# Patient Record
Sex: FEMALE | Race: WHITE | Employment: PART TIME | URBAN - NONMETROPOLITAN AREA
[De-identification: names, ages, dates, MRNs, and addresses within clinical notes are randomized per-mention and may not be internally consistent; named-entity substitution may affect disease eponyms.]

---

## 2017-04-07 ENCOUNTER — OFFICE VISIT (OUTPATIENT)
Dept: FAMILY MEDICINE | Facility: OTHER | Age: 32
End: 2017-04-07
Attending: FAMILY MEDICINE
Payer: COMMERCIAL

## 2017-04-07 VITALS
BODY MASS INDEX: 31 KG/M2 | TEMPERATURE: 97.6 F | RESPIRATION RATE: 20 BRPM | DIASTOLIC BLOOD PRESSURE: 62 MMHG | HEART RATE: 70 BPM | HEIGHT: 64 IN | WEIGHT: 181.6 LBS | SYSTOLIC BLOOD PRESSURE: 118 MMHG | OXYGEN SATURATION: 97 %

## 2017-04-07 DIAGNOSIS — Z82.49 FAMILY HISTORY OF HEART DISEASE IN MALE FAMILY MEMBER BEFORE AGE 55: ICD-10-CM

## 2017-04-07 DIAGNOSIS — Z71.89 ACP (ADVANCE CARE PLANNING): Chronic | ICD-10-CM

## 2017-04-07 DIAGNOSIS — Z76.89 ESTABLISHING CARE WITH NEW DOCTOR, ENCOUNTER FOR: Primary | ICD-10-CM

## 2017-04-07 DIAGNOSIS — K21.9 GASTROESOPHAGEAL REFLUX DISEASE, ESOPHAGITIS PRESENCE NOT SPECIFIED: ICD-10-CM

## 2017-04-07 DIAGNOSIS — Z82.49 FAMILY HISTORY OF HEART DISEASE IN FEMALE FAMILY MEMBER BEFORE AGE 65: ICD-10-CM

## 2017-04-07 DIAGNOSIS — G43.009 MIGRAINE WITHOUT AURA AND WITHOUT STATUS MIGRAINOSUS, NOT INTRACTABLE: ICD-10-CM

## 2017-04-07 DIAGNOSIS — Z30.42 ENCOUNTER FOR MANAGEMENT AND INJECTION OF INJECTABLE PROGESTIN CONTRACEPTIVE: ICD-10-CM

## 2017-04-07 DIAGNOSIS — K58.2 IRRITABLE BOWEL SYNDROME WITH BOTH CONSTIPATION AND DIARRHEA: ICD-10-CM

## 2017-04-07 DIAGNOSIS — F17.200 TOBACCO USE DISORDER: ICD-10-CM

## 2017-04-07 PROCEDURE — 99203 OFFICE O/P NEW LOW 30 MIN: CPT | Performed by: FAMILY MEDICINE

## 2017-04-07 RX ORDER — MEDROXYPROGESTERONE ACETATE 150 MG/ML
150 INJECTION, SUSPENSION INTRAMUSCULAR
COMMUNITY
Start: 2014-10-02 | End: 2017-04-07

## 2017-04-07 RX ORDER — PANTOPRAZOLE SODIUM 40 MG/1
40 TABLET, DELAYED RELEASE ORAL
COMMUNITY
Start: 2016-10-23 | End: 2017-05-04

## 2017-04-07 RX ORDER — SUMATRIPTAN 100 MG/1
100 TABLET, FILM COATED ORAL
Qty: 30 TABLET | Refills: 1 | Status: SHIPPED | OUTPATIENT
Start: 2017-04-07 | End: 2017-09-06

## 2017-04-07 RX ORDER — PROPRANOLOL HYDROCHLORIDE 80 MG/1
CAPSULE, EXTENDED RELEASE ORAL
COMMUNITY
Start: 2017-03-28 | End: 2017-06-26

## 2017-04-07 RX ORDER — MEDROXYPROGESTERONE ACETATE 150 MG/ML
150 INJECTION, SUSPENSION INTRAMUSCULAR
Qty: 0.9 ML | Refills: 3 | OUTPATIENT
Start: 2017-04-07 | End: 2018-05-22

## 2017-04-07 RX ORDER — DOCUSATE SODIUM 100 MG/1
200 CAPSULE, LIQUID FILLED ORAL
COMMUNITY
Start: 2011-06-23 | End: 2018-04-04

## 2017-04-07 RX ORDER — DICYCLOMINE HCL 20 MG
20 TABLET ORAL
COMMUNITY
Start: 2016-01-26 | End: 2017-08-31

## 2017-04-07 RX ORDER — SUMATRIPTAN 100 MG/1
TABLET, FILM COATED ORAL
COMMUNITY
Start: 2017-02-27 | End: 2017-04-07

## 2017-04-07 RX ORDER — PROCHLORPERAZINE MALEATE 5 MG
TABLET ORAL
COMMUNITY
Start: 2016-03-29 | End: 2017-08-09

## 2017-04-07 ASSESSMENT — ANXIETY QUESTIONNAIRES
7. FEELING AFRAID AS IF SOMETHING AWFUL MIGHT HAPPEN: NOT AT ALL
GAD7 TOTAL SCORE: 0
1. FEELING NERVOUS, ANXIOUS, OR ON EDGE: NOT AT ALL
6. BECOMING EASILY ANNOYED OR IRRITABLE: NOT AT ALL
5. BEING SO RESTLESS THAT IT IS HARD TO SIT STILL: NOT AT ALL
3. WORRYING TOO MUCH ABOUT DIFFERENT THINGS: NOT AT ALL
2. NOT BEING ABLE TO STOP OR CONTROL WORRYING: NOT AT ALL

## 2017-04-07 ASSESSMENT — PATIENT HEALTH QUESTIONNAIRE - PHQ9: 5. POOR APPETITE OR OVEREATING: NOT AT ALL

## 2017-04-07 ASSESSMENT — PAIN SCALES - GENERAL: PAINLEVEL: NO PAIN (0)

## 2017-04-07 NOTE — PATIENT INSTRUCTIONS
Depo Provera given.  Awaiting records from [facility].  Will see if due for physical, lab, etc.  Smoking cessation advised.  Healthy diet, exercise, weight management advised.  Will contact cardiology for recommendation of consult/stress test, etc.  Imitrex refilled.

## 2017-04-07 NOTE — PROGRESS NOTES
"SUBJECTIVE:  Aubree is a 31 year old female who comes in today for establish care.  Mom, Kassy, was my patient, but I referred her on to Dr. Carmen given her complex medical issues.  Patient is a smoker, 1/2 ppd, not ready to quit.    She is G0, on Depo provera for 5 years, amenorrheic.  Due for Depo 4/5-4/19.  Last pap 1-2 years ago, no prior abnormal pap or std.   She has chronic migraines, followed by Chrissie Cade, neurology.  Prior Topamax, Elavil, Depakote.  Now on Inderal LA.  Offered botox, but not ready.  Migraines 2 times per week.  Has IBS - \"spastic colon\", and GERD.  Controlled with medications.  She has early family history of CAD.  Mom has had 8 stents and first done in her 40s.  Brother had occlusive disease at 37 requiring stents.  He was taken directly to angiogram rather than stress test given family history.  He passed away from bladder cancer.      Current Outpatient Prescriptions   Medication     loratadine-pseudoePHEDrine (CLARITIN-D 24-HOUR)  MG per 24 hr tablet     dicyclomine (BENTYL) 20 MG tablet     docusate sodium (COLACE) 100 MG capsule     pantoprazole (PROTONIX) 40 MG EC tablet     prochlorperazine (COMPAZINE) 5 MG tablet     propranolol (INDERAL LA) 80 MG 24 hr capsule     medroxyPROGESTERone (DEPO-PROVERA) 150 MG/ML injection     SUMAtriptan (IMITREX) 100 MG tablet     [DISCONTINUED] medroxyPROGESTERone (DEPO-PROVERA) 150 MG/ML injection     No current facility-administered medications for this visit.         Allergies   Allergen Reactions     Linaclotide      diarrhea       History reviewed. No pertinent past medical history.  Past Surgical History:   Procedure Laterality Date     ORTHOPEDIC SURGERY  2002    ankle surgery- bone spur removal     Family History   Problem Relation Age of Onset     HEART DISEASE Mother      8 stents; onset 40s?     Chronic Obstructive Pulmonary Disease Mother      CANCER Father      mouth and throat cancer     HEART DISEASE Brother      " "stents; age 37; occlusive disease     Bladder Cancer Brother      Social History     Social History     Marital status: Single     Spouse name: N/A     Number of children: N/A     Years of education: N/A     Occupational History     Not on file.     Social History Main Topics     Smoking status: Current Every Day Smoker     Packs/day: 0.50     Years: 15.00     Types: Cigarettes     Smokeless tobacco: Not on file     Alcohol use No     Drug use: No     Sexual activity: No     Other Topics Concern     Parent/Sibling W/ Cabg, Mi Or Angioplasty Before 65f 55m? Yes     Social History Narrative     No narrative on file       ROS:  General: positive for as above, headaches  Resp: No shortness of breath, No dyspnea on exertion and No cough  CV: negative for, palpitations, chest pain, exertional chest pain or pressure, lower extremity edema and syncope or near-syncope  Musculoskeletal: negative for, joint pain and joint swelling  Neurologic: positive for migraine headaches  Psychiatric: negative for, anxiety, depression, excessive alcohol consumption and illegal drug usage  Endocrine: negative  PHQ-9 SCORE 4/7/2017   Total Score 0     JESSICA-7 SCORE 4/7/2017   Total Score 0       OBJECTIVE:  Vitals:    04/07/17 0805   BP: 118/62   BP Location: Right arm   Patient Position: Chair   Cuff Size: Adult Large   Pulse: 70   Resp: 20   Temp: 97.6  F (36.4  C)   TempSrc: Tympanic   SpO2: 97%   Weight: 181 lb 9.6 oz (82.4 kg)   Height: 5' 3.5\" (1.613 m)     GENERAL APPEARANCE: alert, no distress and cooperative  NECK: no adenopathy, no asymmetry, masses, or scars and thyroid normal to palpation  RESP: lungs clear to auscultation - no rales, rhonchi or wheezes  CV: regular rates and rhythm, normal S1 S2, no S3 or S4 and no murmur, click or rub -  MS: extremities normal- no gross deformities noted, no evidence of inflammation in joints, FROM in all extremities.  PSYCH: mentation appears normal. and affect " normal/bright    ASSESSMENT/ORDERS:    ICD-10-CM    1. Establishing care with new doctor, encounter for Z71.89    2. ACP (advance care planning) Z71.89    3. Tobacco use disorder F17.200    4. Migraine without aura and without status migrainosus, not intractable G43.009 SUMAtriptan (IMITREX) 100 MG tablet   5. Gastroesophageal reflux disease, esophagitis presence not specified K21.9    6. Irritable bowel syndrome with both constipation and diarrhea K58.2    7. Family history of heart disease in female family member before age 65 Z82.49    8. Family history of heart disease in male family member before age 55 Z82.49    9. Encounter for management and injection of injectable progestin contraceptive Z30.42 medroxyPROGESTERone (DEPO-PROVERA) 150 MG/ML injection     C Medroxyprogesterone inj/1mg     INJECTION INTRAMUSCULAR OR SUB-Q     PLAN:  Patient Instructions   Depo Provera given.  Awaiting records from [facility].  Will see if due for physical, lab, etc.  Smoking cessation advised.  Healthy diet, exercise, weight management advised.  Will contact cardiology for recommendation of consult/stress test, etc.  Imitrex refilled.    Note sent to Dr. Fu.    Kendra Bailey

## 2017-04-07 NOTE — MR AVS SNAPSHOT
After Visit Summary   4/7/2017    Aubree Corcoran    MRN: [id number]           Patient Information     Date Of Birth          1985        Visit Information        Provider Department      4/7/2017 8:15 AM Kendra Yanez MD [facility]        Today's Diagnoses     Establishing care with new doctor, encounter for    -  1    ACP (advance care planning)        Tobacco use disorder        Migraine without aura and without status migrainosus, not intractable        Gastroesophageal reflux disease, esophagitis presence not specified        Irritable bowel syndrome with both constipation and diarrhea        Family history of heart disease in female family member before age 65        Family history of heart disease in male family member before age 55        Encounter for management and injection of injectable progestin contraceptive          Care Instructions    Depo Provera given.  Awaiting records from [facility].  Will see if due for physical, lab, etc.  Smoking cessation advised.  Healthy diet, exercise, weight management advised.  Will contact cardiology for recommendation of consult/stress test, etc.  Imitrex refilled.        Follow-ups after your visit        Future tests that were ordered for you today     Open Standing Orders        Priority Remaining Interval Expires Ordered    C Medroxyprogesterone inj/1mg Routine 4/4 4/7/2018 4/7/2017    INJECTION INTRAMUSCULAR OR SUB-Q Routine 4/4 4/7/2018 4/7/2017            Who to contact     If you have questions or need follow up information about today's clinic visit or your schedule please contact [facility] directly at [phone].  Normal or non-critical lab and imaging results will be communicated to you by MyChart, letter or phone within 4 business days after the clinic has received the results. If you do not hear from us within 7 days, please contact the clinic through MyChart or phone. If you have a critical or  "abnormal lab result, we will notify you by phone as soon as possible.  Submit refill requests through [employer] or call your pharmacy and they will forward the refill request to us. Please allow 3 business days for your refill to be completed.          Additional Information About Your Visit        [employer] Information     [employer] lets you send messages to your doctor, view your test results, renew your prescriptions, schedule appointments and more. To sign up, go to [email or web address] . Click on \"Log in\" on the left side of the screen, which will take you to the Welcome page. Then click on \"Sign up Now\" on the right side of the page.     You will be asked to enter the access code listed below, as well as some personal information. Please follow the directions to create your username and password.     Your access code is: [email or web address]  Expires: 2017  8:30 AM     Your access code will  in 90 days. If you need help or a new code, please call your [facility] clinic or [phone].        Care EveryWhere ID     This is your Care EveryWhere ID. This could be used by other organizations to access your [facility] medical records  [id number]        Your Vitals Were     Pulse Temperature Respirations Height Pulse Oximetry BMI (Body Mass Index)    70 97.6  F (36.4  C) (Tympanic) 20 5' 3.5\" (1.613 m) 97% 31.66 kg/m2       Blood Pressure from Last 3 Encounters:   17 118/62    Weight from Last 3 Encounters:   17 181 lb 9.6 oz (82.4 kg)                 Today's Medication Changes          These changes are accurate as of: 17  8:30 AM.  If you have any questions, ask your nurse or doctor.               These medicines have changed or have updated prescriptions.        Dose/Directions    medroxyPROGESTERone 150 MG/ML injection   Commonly known as:  DEPO-PROVERA   This may have changed:  when to take this   Used for:  Encounter for management and injection of injectable progestin contraceptive   Changed by: "  Kendra Yanez MD        Dose:  150 mg   Inject 1 mL (150 mg) into the muscle every 3 months   Quantity:  0.9 mL   Refills:  3       SUMAtriptan 100 MG tablet   Commonly known as:  IMITREX   This may have changed:  See the new instructions.   Used for:  Migraine without aura and without status migrainosus, not intractable   Changed by:  Kendra Yanez MD        Dose:  100 mg   Take 1 tablet (100 mg) by mouth at onset of headache for migraine   Quantity:  30 tablet   Refills:  1            Where to get your medicines      These medications were sent to [facility] - [facility] [address]  [address] [facility] [address]     Phone:  [phone]     SUMAtriptan 100 MG tablet         Some of these will need a paper prescription and others can be bought over the counter.  Ask your nurse if you have questions.     You don't need a prescription for these medications     medroxyPROGESTERone 150 MG/ML injection                Primary Care Provider Office Phone # Fax #    Neema Lovett [phone]       [facility] [address]  [facility] [address]        Thank you!     Thank you for choosing [facility]  for your care. Our goal is always to provide you with excellent care. Hearing back from our patients is one way we can continue to improve our services. Please take a few minutes to complete the written survey that you may receive in the mail after your visit with us. Thank you!             Your Updated Medication List - Protect others around you: Learn how to safely use, store and throw away your medicines at www.disposemymeds.org.          This list is accurate as of: 4/7/17  8:30 AM.  Always use your most recent med list.                   Brand Name Dispense Instructions for use    dicyclomine 20 MG tablet    BENTYL     Take 20 mg by mouth       docusate sodium 100 MG capsule    COLACE     Take 200 mg by mouth        loratadine-pseudoePHEDrine  MG per 24 hr tablet    CLARITIN-D 24-hour     TAKE 1 TABLET DAILY BY MOUTH - GENERIC FOR CLARITIN-D       medroxyPROGESTERone 150 MG/ML injection    DEPO-PROVERA    0.9 mL    Inject 1 mL (150 mg) into the muscle every 3 months       pantoprazole 40 MG EC tablet    PROTONIX     Take 40 mg by mouth       prochlorperazine 5 MG tablet    COMPAZINE     TAKE ONE TO TWO TABLETS BY MOUTH EVERY 8 HOURS AS NEEDED FOR NAUSEA       propranolol 80 MG 24 hr capsule    INDERAL LA     TAKE ONE CAPSULE BY MOUTH ONCE DAILY       SUMAtriptan 100 MG tablet    IMITREX    30 tablet    Take 1 tablet (100 mg) by mouth at onset of headache for migraine

## 2017-04-08 ASSESSMENT — PATIENT HEALTH QUESTIONNAIRE - PHQ9: SUM OF ALL RESPONSES TO PHQ QUESTIONS 1-9: 0

## 2017-04-08 ASSESSMENT — ANXIETY QUESTIONNAIRES: GAD7 TOTAL SCORE: 0

## 2017-04-10 ENCOUNTER — TELEPHONE (OUTPATIENT)
Dept: FAMILY MEDICINE | Facility: OTHER | Age: 32
End: 2017-04-10

## 2017-04-10 DIAGNOSIS — Z82.49 FAMILY HISTORY OF HEART DISEASE IN MALE FAMILY MEMBER BEFORE AGE 55: ICD-10-CM

## 2017-04-10 DIAGNOSIS — Z82.49 FAMILY HISTORY OF HEART DISEASE IN FEMALE FAMILY MEMBER BEFORE AGE 65: ICD-10-CM

## 2017-04-10 DIAGNOSIS — Z82.49 FAMILY HISTORY OF EARLY CAD: Primary | ICD-10-CM

## 2017-04-10 NOTE — TELEPHONE ENCOUNTER
Receive response from cardiology.  They do recommend consult with Dr. Malave, given early family history.  Consult ordered.  Please notify patient to expect a call.  Thank you Td

## 2017-04-28 DIAGNOSIS — K21.9 GASTROESOPHAGEAL REFLUX DISEASE, ESOPHAGITIS PRESENCE NOT SPECIFIED: Primary | ICD-10-CM

## 2017-04-28 RX ORDER — PANTOPRAZOLE SODIUM 40 MG/1
40 TABLET, DELAYED RELEASE ORAL DAILY
Qty: 90 TABLET | Refills: 0 | OUTPATIENT
Start: 2017-04-28

## 2017-05-04 RX ORDER — PANTOPRAZOLE SODIUM 40 MG/1
40 TABLET, DELAYED RELEASE ORAL DAILY
Qty: 30 TABLET | Refills: 3 | Status: SHIPPED | OUTPATIENT
Start: 2017-05-04 | End: 2017-07-24

## 2017-05-04 NOTE — TELEPHONE ENCOUNTER
Pt would like to know if Dr Yanez can fill this medication since she established care with her on 4-7-17. Patient states she is currently out of this medication.

## 2017-06-26 DIAGNOSIS — J30.2 CHRONIC SEASONAL ALLERGIC RHINITIS, UNSPECIFIED TRIGGER: ICD-10-CM

## 2017-06-26 DIAGNOSIS — G43.009 MIGRAINE WITHOUT AURA AND WITHOUT STATUS MIGRAINOSUS, NOT INTRACTABLE: Primary | ICD-10-CM

## 2017-06-28 ENCOUNTER — ALLIED HEALTH/NURSE VISIT (OUTPATIENT)
Dept: ALLERGY | Facility: OTHER | Age: 32
End: 2017-06-28
Attending: FAMILY MEDICINE
Payer: COMMERCIAL

## 2017-06-28 DIAGNOSIS — Z30.42 ENCOUNTER FOR SURVEILLANCE OF INJECTABLE CONTRACEPTIVE: Primary | ICD-10-CM

## 2017-06-28 PROCEDURE — 96372 THER/PROPH/DIAG INJ SC/IM: CPT

## 2017-06-28 RX ORDER — PROPRANOLOL HYDROCHLORIDE 80 MG/1
CAPSULE, EXTENDED RELEASE ORAL
Qty: 90 CAPSULE | Refills: 0 | Status: SHIPPED | OUTPATIENT
Start: 2017-06-28 | End: 2017-12-15

## 2017-06-28 RX ORDER — LORATADINE AND PSEUDOEPHEDRINE SULFATE 10; 240 MG/1; MG/1
TABLET, FILM COATED, EXTENDED RELEASE ORAL
Qty: 90 TABLET | Refills: 0 | Status: SHIPPED | OUTPATIENT
Start: 2017-06-28 | End: 2017-10-30

## 2017-06-28 NOTE — NURSING NOTE
Prior to injection verified patient identity using patient's name and date of birth. Per orders of Dr. Tripathi, injection of Depo provera  given by Jessica Martinez. Patient instructed to remain in clinic for 20 minutes afterwards, and to report any adverse reaction to me immediately.      Jessica Martinez LPN

## 2017-06-28 NOTE — TELEPHONE ENCOUNTER
propranolol (INDERAL LA) 80 MG 24 hr capsule  Last Written Prescription Date: HISTORICAL  Last Fill Quantity: HISTORICAL, # refills: 0  Last Office Visit with [facility], [facility] or [facility] prescribing provider: 4-7-2017       BP Readings from Last 3 Encounters:   04/07/17 118/62     ALLERGY NASAL DECONGESTION     Last Written Prescription Date: HISTORICAL  Last Fill Quantity: HISTORICAL,  # refills: 0   Last Office Visit with [facility], [facility] or [facility] prescribing provider: 4-7-2017

## 2017-06-28 NOTE — MR AVS SNAPSHOT
"              After Visit Summary   2017    Aubree Corcoran    MRN: [id number]           Patient Information     Date Of Birth          1985        Visit Information        Provider Department      2017 2:30 PM HC SHOT ROOM [facility]        Today's Diagnoses     Encounter for surveillance of injectable contraceptive    -  1       Follow-ups after your visit        Who to contact     If you have questions or need follow up information about today's clinic visit or your schedule please contact [facility] directly at [phone].  Normal or non-critical lab and imaging results will be communicated to you by MyChart, letter or phone within 4 business days after the clinic has received the results. If you do not hear from us within 7 days, please contact the clinic through [employer] or phone. If you have a critical or abnormal lab result, we will notify you by phone as soon as possible.  Submit refill requests through [employer] or call your pharmacy and they will forward the refill request to us. Please allow 3 business days for your refill to be completed.          Additional Information About Your Visit        MyChart Information     [employer] lets you send messages to your doctor, view your test results, renew your prescriptions, schedule appointments and more. To sign up, go to [email or web address] . Click on \"Log in\" on the left side of the screen, which will take you to the Welcome page. Then click on \"Sign up Now\" on the right side of the page.     You will be asked to enter the access code listed below, as well as some personal information. Please follow the directions to create your username and password.     Your access code is: [email or web address]  Expires: 2017  8:30 AM     Your access code will  in 90 days. If you need help or a new code, please call your [facility] or [phone].        Care EveryWhere ID     This is your Care EveryWhere ID. This could " be used by other organizations to access your [place] medical records  [id number]         Blood Pressure from Last 3 Encounters:   04/07/17 118/62    Weight from Last 3 Encounters:   04/07/17 181 lb 9.6 oz (82.4 kg)              We Performed the Following     C Medroxyprogesterone inj/1mg     INJECTION INTRAMUSCULAR OR SUB-Q        Primary Care Provider Office Phone # Fax #    Kendra Yanez [phone]       [facility] [address]  [place] [address]        Equal Access to Services     [facility]: Hadii aad ku hadasho Soomaali, waaxda luqadaha, qaybta kaalmada adeegyada, waxay scarletin haysethn adebrigida johnson . So [facility] [phone].    ATENCIÓN: Si habla español, tiene a mishra disposición servicios gratuitos de asistencia lingüística. [facility] [phone].    We comply with applicable federal civil rights laws and [place] laws. We do not discriminate on the basis of race, color, national origin, age, disability sex, sexual orientation or gender identity.            Thank you!     Thank you for choosing [facility]  for your care. Our goal is always to provide you with excellent care. Hearing back from our patients is one way we can continue to improve our services. Please take a few minutes to complete the written survey that you may receive in the mail after your visit with us. Thank you!             Your Updated Medication List - Protect others around you: Learn how to safely use, store and throw away your medicines at www.disposemymeds.org.          This list is accurate as of: 6/28/17  2:46 PM.  Always use your most recent med list.                   Brand Name Dispense Instructions for use Diagnosis    dicyclomine 20 MG tablet    BENTYL     Take 20 mg by mouth        docusate sodium 100 MG capsule    COLACE     Take 200 mg by mouth        loratadine-pseudoePHEDrine  MG per 24 hr tablet    CLARITIN-D 24-hour     TAKE 1 TABLET DAILY BY MOUTH -  GENERIC FOR CLARITIN-D        medroxyPROGESTERone 150 MG/ML injection    DEPO-PROVERA    0.9 mL    Inject 1 mL (150 mg) into the muscle every 3 months    Encounter for management and injection of injectable progestin contraceptive       pantoprazole 40 MG EC tablet    PROTONIX    30 tablet    Take 1 tablet (40 mg) by mouth daily    Gastroesophageal reflux disease, esophagitis presence not specified       prochlorperazine 5 MG tablet    COMPAZINE     TAKE ONE TO TWO TABLETS BY MOUTH EVERY 8 HOURS AS NEEDED FOR NAUSEA        propranolol 80 MG 24 hr capsule    INDERAL LA     TAKE ONE CAPSULE BY MOUTH ONCE DAILY        SUMAtriptan 100 MG tablet    IMITREX    30 tablet    Take 1 tablet (100 mg) by mouth at onset of headache for migraine    Migraine without aura and without status migrainosus, not intractable

## 2017-06-28 NOTE — PROGRESS NOTES
The following medication was given:     MEDICATION: Depo Provera 150mg  ROUTE: IM  SITE: [facility]  DOSE: 1ml  LOT #: [id number]  :  Shan CAREY   EXPIRATION DATE:  12/2019  NDC#: [address]-6  Patient instructed to return September 13th-27th, 2017    Jessica Martinez LPN

## 2017-07-24 DIAGNOSIS — K21.9 GASTROESOPHAGEAL REFLUX DISEASE, ESOPHAGITIS PRESENCE NOT SPECIFIED: ICD-10-CM

## 2017-07-26 RX ORDER — PANTOPRAZOLE SODIUM 40 MG/1
TABLET, DELAYED RELEASE ORAL
Qty: 30 TABLET | Refills: 8 | Status: SHIPPED | OUTPATIENT
Start: 2017-07-26 | End: 2018-05-15

## 2017-08-09 ENCOUNTER — TELEPHONE (OUTPATIENT)
Dept: FAMILY MEDICINE | Facility: OTHER | Age: 32
End: 2017-08-09

## 2017-08-09 DIAGNOSIS — G43.009 MIGRAINE WITHOUT AURA AND WITHOUT STATUS MIGRAINOSUS, NOT INTRACTABLE: Primary | ICD-10-CM

## 2017-08-09 RX ORDER — PROCHLORPERAZINE MALEATE 5 MG
TABLET ORAL
Qty: 90 TABLET | Refills: 0 | Status: SHIPPED | OUTPATIENT
Start: 2017-08-09 | End: 2017-11-06

## 2017-08-09 NOTE — TELEPHONE ENCOUNTER
Reason for call:  Medication    1. Medication Name? Prochlorper 5mg One to two tablets by mouth as needed every 8 hours as needed for nausea  2. Is this request for a refill? Yes  3. What Pharmacy do you use? '[facility]  4. Have you contacted your pharmacy? No    5. If yes, when?  (Please note that the turn-around-time for prescriptions is 72 business hours; I am sending your request at this time. SEND TO  [facility]  )  Description: Patient would like to know if Dr Yanez can refill this prescription. This is the first time she has needed to request a refill on this medication since establishing with Dr Yanez and pharmacy told her she should have it initiated through provider.  Was an appointment offered for this a call? No   Preferred method for responding to this messageTelephone Call - [phone]  If we cannot reach you directly, may we leave a detailed response at the number you provided? Yes  Can this message wait until your PCP/Provider returns if not available today? No

## 2017-08-31 DIAGNOSIS — K58.2 IRRITABLE BOWEL SYNDROME WITH BOTH CONSTIPATION AND DIARRHEA: Primary | ICD-10-CM

## 2017-08-31 RX ORDER — DICYCLOMINE HCL 20 MG
20 TABLET ORAL 3 TIMES DAILY
Qty: 120 TABLET | Refills: 0 | Status: SHIPPED | OUTPATIENT
Start: 2017-08-31 | End: 2017-11-20

## 2017-08-31 NOTE — TELEPHONE ENCOUNTER
Dicyclomine historical on Epic medication list. Last office visit on 4.7.17. Medication pended.Thank you

## 2017-09-06 DIAGNOSIS — G43.009 MIGRAINE WITHOUT AURA AND WITHOUT STATUS MIGRAINOSUS, NOT INTRACTABLE: ICD-10-CM

## 2017-09-07 RX ORDER — SUMATRIPTAN 100 MG/1
TABLET, FILM COATED ORAL
Qty: 9 TABLET | Refills: 1 | Status: SHIPPED | OUTPATIENT
Start: 2017-09-07 | End: 2017-12-01

## 2017-09-07 NOTE — TELEPHONE ENCOUNTER
SUMAtriptan (IMITREX) 100 MG tablet    Last Written Prescription Date: 04/07/2017  Last Fill Quantity: 30, # refills: 1  Last Office Visit with [facility], [facility] or [facility] prescribing provider: 04/07/2017       BP Readings from Last 3 Encounters:   04/07/17 118/62

## 2017-09-20 ENCOUNTER — ALLIED HEALTH/NURSE VISIT (OUTPATIENT)
Dept: ALLERGY | Facility: OTHER | Age: 32
End: 2017-09-20
Attending: FAMILY MEDICINE
Payer: COMMERCIAL

## 2017-09-20 DIAGNOSIS — Z30.42 ENCOUNTER FOR MANAGEMENT AND INJECTION OF INJECTABLE PROGESTIN CONTRACEPTIVE: ICD-10-CM

## 2017-09-20 PROCEDURE — 96372 THER/PROPH/DIAG INJ SC/IM: CPT

## 2017-09-20 NOTE — PROGRESS NOTES
The following medication was given:     MEDICATION: Depo Provera 150mg  ROUTE: IM  SITE: [facility]  DOSE: 150 mg  LOT #: [id number]  :  Shan CAREY   EXPIRATION DATE:  12/2019  NDC#: [address]-2  Next depo due December 6 - December 20, 2017  Kassy Alejandre LPN

## 2017-09-20 NOTE — MR AVS SNAPSHOT
"              After Visit Summary   2017    Aubree Corcoran    MRN: [id number]           Patient Information     Date Of Birth          1985        Visit Information        Provider Department      2017 10:00 AM  SHOT ROOM [place] [facility]        Today's Diagnoses     Encounter for management and injection of injectable progestin contraceptive           Follow-ups after your visit        Who to contact     If you have questions or need follow up information about today's clinic visit or your schedule please contact [facility] directly at [phone].  Normal or non-critical lab and imaging results will be communicated to you by MyChart, letter or phone within 4 business days after the clinic has received the results. If you do not hear from us within 7 days, please contact the clinic through [employer] or phone. If you have a critical or abnormal lab result, we will notify you by phone as soon as possible.  Submit refill requests through [employer] or call your pharmacy and they will forward the refill request to us. Please allow 3 business days for your refill to be completed.          Additional Information About Your Visit        MyChart Information     [employer] lets you send messages to your doctor, view your test results, renew your prescriptions, schedule appointments and more. To sign up, go to [email or web address] . Click on \"Log in\" on the left side of the screen, which will take you to the Welcome page. Then click on \"Sign up Now\" on the right side of the page.     You will be asked to enter the access code listed below, as well as some personal information. Please follow the directions to create your username and password.     Your access code is: [email or web address]  Expires: 2017 10:08 AM     Your access code will  in 90 days. If you need help or a new code, please call your [facility] or [phone].        Care EveryWhere ID     This is your Care " EveryWhere ID. This could be used by other organizations to access your [place] medical records  [id number]         Blood Pressure from Last 3 Encounters:   04/07/17 118/62    Weight from Last 3 Encounters:   04/07/17 181 lb 9.6 oz (82.4 kg)              We Performed the Following     C Medroxyprogesterone inj/1mg     INJECTION INTRAMUSCULAR OR SUB-Q        Primary Care Provider Office Phone # Fax #    Kendra Yanez [phone]       [facility] [address]  [place] [address]        Equal Access to Services     [facility]: Hadii aad ku hadasho Soomaali, waaxda luqadaha, qaybta kaalmada adeegyada, choco wright hayelvia johnson . So [facility] [phone].    ATENCIÓN: Si habla español, tiene a mishra disposición servicios gratuitos de asistencia lingüística. [facility] [phone].    We comply with applicable federal civil rights laws and [place] laws. We do not discriminate on the basis of race, color, national origin, age, disability sex, sexual orientation or gender identity.            Thank you!     Thank you for choosing [facility]  for your care. Our goal is always to provide you with excellent care. Hearing back from our patients is one way we can continue to improve our services. Please take a few minutes to complete the written survey that you may receive in the mail after your visit with us. Thank you!             Your Updated Medication List - Protect others around you: Learn how to safely use, store and throw away your medicines at www.disposemymeds.org.          This list is accurate as of: 9/20/17 10:08 AM.  Always use your most recent med list.                   Brand Name Dispense Instructions for use Diagnosis    ALLERGY RELIEF/NASAL DECONGEST  MG per 24 hr tablet   Generic drug:  loratadine-pseudoePHEDrine     90 tablet    TAKE 1 TABLET BY MOUTH DAILY    Chronic seasonal allergic rhinitis, unspecified trigger       dicyclomine 20 MG  tablet    BENTYL    120 tablet    Take 1 tablet (20 mg) by mouth 3 times daily    Irritable bowel syndrome with both constipation and diarrhea       docusate sodium 100 MG capsule    COLACE     Take 200 mg by mouth        medroxyPROGESTERone 150 MG/ML injection    DEPO-PROVERA    0.9 mL    Inject 1 mL (150 mg) into the muscle every 3 months    Encounter for management and injection of injectable progestin contraceptive       pantoprazole 40 MG EC tablet    PROTONIX    30 tablet    TAKE 1 TABLET BY MOUTH DAILY    Gastroesophageal reflux disease, esophagitis presence not specified       prochlorperazine 5 MG tablet    COMPAZINE    90 tablet    TAKE ONE TO TWO TABLETS BY MOUTH EVERY 8 HOURS AS NEEDED FOR NAUSEA    Migraine without aura and without status migrainosus, not intractable       propranolol 80 MG 24 hr capsule    INDERAL LA    90 capsule    TAKE 1 CAPSULE BY MOUTH DAILY    Migraine without aura and without status migrainosus, not intractable       SUMAtriptan 100 MG tablet    IMITREX    9 tablet    TAKE 1 TABLET BY MOUTH AT ONSET OF HEADACHE FOR MIGRAINE    Migraine without aura and without status migrainosus, not intractable

## 2017-09-20 NOTE — NURSING NOTE
The following medication was given:     MEDICATION: Depo Provera 150mg  ROUTE: IM  SITE: [facility]  DOSE: 150 mg  LOT #: [id number]  :  Shan CAREY   EXPIRATION DATE:  12/2019  NDC#: [address]-4  Next depo due December 6 - December 20, 2017  Kassy Alejandre LPN

## 2017-10-30 DIAGNOSIS — J30.2 CHRONIC SEASONAL ALLERGIC RHINITIS, UNSPECIFIED TRIGGER: ICD-10-CM

## 2017-11-02 RX ORDER — LORATADINE AND PSEUDOEPHEDRINE SULFATE 10; 240 MG/1; MG/1
TABLET, FILM COATED, EXTENDED RELEASE ORAL
Qty: 90 TABLET | Refills: 0 | Status: SHIPPED | OUTPATIENT
Start: 2017-11-02 | End: 2018-01-03

## 2017-11-06 DIAGNOSIS — G43.009 MIGRAINE WITHOUT AURA AND WITHOUT STATUS MIGRAINOSUS, NOT INTRACTABLE: ICD-10-CM

## 2017-11-06 NOTE — TELEPHONE ENCOUNTER
Compazine      Last Written Prescription Date: 8/9/17  Last Fill Quantity: 90,  # refills: 0   Last Office Visit with G, [facility] or [facility] prescribing provider: 9/20/17

## 2017-11-09 RX ORDER — PROCHLORPERAZINE MALEATE 5 MG
TABLET ORAL
Qty: 90 TABLET | Refills: 0 | Status: SHIPPED | OUTPATIENT
Start: 2017-11-09 | End: 2018-02-07

## 2017-11-20 DIAGNOSIS — K58.2 IRRITABLE BOWEL SYNDROME WITH BOTH CONSTIPATION AND DIARRHEA: ICD-10-CM

## 2017-11-20 RX ORDER — DICYCLOMINE HCL 20 MG
TABLET ORAL
Qty: 120 TABLET | Refills: 1 | Status: SHIPPED | OUTPATIENT
Start: 2017-11-20 | End: 2018-01-09

## 2017-11-20 NOTE — TELEPHONE ENCOUNTER
DICYCLOMINE 20mg TABS     Last Written Prescription Date: 8/31/2017  Last Fill Quantity: 120,  # refills: 0   Last Office Visit with [facility], [facility] or [facility] prescribing provider: 4/07/2017

## 2017-11-26 ENCOUNTER — HEALTH MAINTENANCE LETTER (OUTPATIENT)
Age: 32
End: 2017-11-26

## 2017-12-01 DIAGNOSIS — G43.009 MIGRAINE WITHOUT AURA AND WITHOUT STATUS MIGRAINOSUS, NOT INTRACTABLE: ICD-10-CM

## 2017-12-01 NOTE — TELEPHONE ENCOUNTER
Imitrex      Last Written Prescription Date: 9/7/17  Last Fill Quantity: 9,  # refills: 1   Last Office Visit with G, P or [facility] prescribing provider: 4/7/17

## 2017-12-04 RX ORDER — SUMATRIPTAN 100 MG/1
TABLET, FILM COATED ORAL
Qty: 9 TABLET | Refills: 0 | Status: SHIPPED | OUTPATIENT
Start: 2017-12-04 | End: 2018-01-03

## 2017-12-11 ENCOUNTER — ALLIED HEALTH/NURSE VISIT (OUTPATIENT)
Dept: ALLERGY | Facility: OTHER | Age: 32
End: 2017-12-11
Attending: FAMILY MEDICINE
Payer: COMMERCIAL

## 2017-12-11 PROCEDURE — 96372 THER/PROPH/DIAG INJ SC/IM: CPT

## 2017-12-11 NOTE — MR AVS SNAPSHOT
"              After Visit Summary   2017    Aubree Corcoran    MRN: [id number]           Patient Information     Date Of Birth          1985        Visit Information        Provider Department      2017 10:30 AM HC SHOT ROOM [facility]        Today's Diagnoses     Contraception    -  1       Follow-ups after your visit        Who to contact     If you have questions or need follow up information about today's clinic visit or your schedule please contact [facility] directly at [phone].  Normal or non-critical lab and imaging results will be communicated to you by MyChart, letter or phone within 4 business days after the clinic has received the results. If you do not hear from us within 7 days, please contact the clinic through [employer] or phone. If you have a critical or abnormal lab result, we will notify you by phone as soon as possible.  Submit refill requests through [employer] or call your pharmacy and they will forward the refill request to us. Please allow 3 business days for your refill to be completed.          Additional Information About Your Visit        MyChart Information     [employer] lets you send messages to your doctor, view your test results, renew your prescriptions, schedule appointments and more. To sign up, go to [email or web address] . Click on \"Log in\" on the left side of the screen, which will take you to the Welcome page. Then click on \"Sign up Now\" on the right side of the page.     You will be asked to enter the access code listed below, as well as some personal information. Please follow the directions to create your username and password.     Your access code is: [email or web address]  Expires: 2017  9:08 AM     Your access code will  in 90 days. If you need help or a new code, please call your [facility]] or [phone].        Care EveryWhere ID     This is your Care EveryWhere ID. This could be used by other organizations to " access your [place] medical records  [id number]         Blood Pressure from Last 3 Encounters:   04/07/17 118/62    Weight from Last 3 Encounters:   04/07/17 181 lb 9.6 oz (82.4 kg)              We Performed the Following     Medroxyprogesterone inj/1mg (Depo Provera J-Code)     THER/PROPH/DIAG INJ, SC/IM        Primary Care Provider Office Phone # Fax #    Kendra Yanez [phone]       [facility] [address]  [facility] [address]        Equal Access to Services     [facility]: Hadii aad ku hadasho Soomaali, waaxda luqadaha, qaybta kaalmada adeegyada, waxay scarletin hayaan adebrigida johnson . So [facility] [phone].    ATENCIÓN: Si habla español, tiene a mishra disposición servicios gratuitos de asistencia lingüística. [facility] [phone].    We comply with applicable federal civil rights laws and [place] laws. We do not discriminate on the basis of race, color, national origin, age, disability, sex, sexual orientation, or gender identity.            Thank you!     Thank you for choosing [facility]  for your care. Our goal is always to provide you with excellent care. Hearing back from our patients is one way we can continue to improve our services. Please take a few minutes to complete the written survey that you may receive in the mail after your visit with us. Thank you!             Your Updated Medication List - Protect others around you: Learn how to safely use, store and throw away your medicines at www.disposemymeds.org.          This list is accurate as of: 12/11/17 10:40 AM.  Always use your most recent med list.                   Brand Name Dispense Instructions for use Diagnosis    ALLERGY RELIEF/NASAL DECONGEST  MG per 24 hr tablet   Generic drug:  loratadine-pseudoePHEDrine     90 tablet    TAKE 1 TABLET BY MOUTH DAILY    Chronic seasonal allergic rhinitis, unspecified trigger       dicyclomine 20 MG tablet    BENTYL    120 tablet    TAKE 1  TABLET BY MOUTH 3 TIMES A DAY    Irritable bowel syndrome with both constipation and diarrhea       docusate sodium 100 MG capsule    COLACE     Take 200 mg by mouth        medroxyPROGESTERone 150 MG/ML injection    DEPO-PROVERA    0.9 mL    Inject 1 mL (150 mg) into the muscle every 3 months    Encounter for management and injection of injectable progestin contraceptive       pantoprazole 40 MG EC tablet    PROTONIX    30 tablet    TAKE 1 TABLET BY MOUTH DAILY    Gastroesophageal reflux disease, esophagitis presence not specified       prochlorperazine 5 MG tablet    COMPAZINE    90 tablet    TAKE 1 TO 2 TABLETS BY MOUTH EVERY 8 HOURS AS NEEDED FOR NAUSEA    Migraine without aura and without status migrainosus, not intractable       propranolol 80 MG 24 hr capsule    INDERAL LA    90 capsule    TAKE 1 CAPSULE BY MOUTH DAILY    Migraine without aura and without status migrainosus, not intractable       SUMAtriptan 100 MG tablet    IMITREX    9 tablet    TAKE 1 TABLET BY MOUTH AT ONSET OF HEADACHE FOR MIGRAINE    Migraine without aura and without status migrainosus, not intractable

## 2017-12-11 NOTE — PROGRESS NOTES
The following medication was given:     MEDICATION: Depo Provera 150mg  ROUTE: IM  SITE: LUQ - Gluteus  DOSE: 1 ml  LOT #: [id number]  :  [employer]   EXPIRATION DATE:  03/2020  NDC#: [address]-2  Patient will return for next injection between 2/26/18 to 3/12/18.  Missy Harding

## 2017-12-15 DIAGNOSIS — G43.009 MIGRAINE WITHOUT AURA AND WITHOUT STATUS MIGRAINOSUS, NOT INTRACTABLE: ICD-10-CM

## 2017-12-18 RX ORDER — PROPRANOLOL HYDROCHLORIDE 80 MG/1
CAPSULE, EXTENDED RELEASE ORAL
Qty: 30 CAPSULE | Refills: 0 | Status: SHIPPED | OUTPATIENT
Start: 2017-12-18 | End: 2018-02-13

## 2018-01-03 DIAGNOSIS — J30.2 CHRONIC SEASONAL ALLERGIC RHINITIS, UNSPECIFIED TRIGGER: ICD-10-CM

## 2018-01-03 DIAGNOSIS — G43.009 MIGRAINE WITHOUT AURA AND WITHOUT STATUS MIGRAINOSUS, NOT INTRACTABLE: ICD-10-CM

## 2018-01-05 RX ORDER — LORATADINE AND PSEUDOEPHEDRINE SULFATE 10; 240 MG/1; MG/1
TABLET, FILM COATED, EXTENDED RELEASE ORAL
Qty: 30 TABLET | Refills: 0 | Status: SHIPPED | OUTPATIENT
Start: 2018-01-05 | End: 2018-02-28

## 2018-01-05 RX ORDER — SUMATRIPTAN 100 MG/1
TABLET, FILM COATED ORAL
Qty: 9 TABLET | Refills: 0 | Status: SHIPPED | OUTPATIENT
Start: 2018-01-05 | End: 2018-02-02

## 2018-01-05 NOTE — TELEPHONE ENCOUNTER
Please see note below for Allergy Relief/Nasal Decongest.  Last signed: 11/2/17 #90, 0 R.  Thank you.

## 2018-01-05 NOTE — TELEPHONE ENCOUNTER
imitrex      Last Written Prescription Date:  12/4/17  Last Fill Quantity: 9,   # refills: 0  Last Office Visit: 4/7/17  Future Office visit:  None    Allergy relief  Last Written Prescription Date:  11/2/17  Last Fill Quantity: 90,   # refills: 0  Last Office Visit: 4/7/17  Future Office visit:

## 2018-02-02 DIAGNOSIS — G43.009 MIGRAINE WITHOUT AURA AND WITHOUT STATUS MIGRAINOSUS, NOT INTRACTABLE: ICD-10-CM

## 2018-02-02 RX ORDER — SUMATRIPTAN 100 MG/1
TABLET, FILM COATED ORAL
Qty: 9 TABLET | Refills: 0 | Status: SHIPPED | OUTPATIENT
Start: 2018-02-02 | End: 2018-03-05

## 2018-02-07 DIAGNOSIS — G43.009 MIGRAINE WITHOUT AURA AND WITHOUT STATUS MIGRAINOSUS, NOT INTRACTABLE: ICD-10-CM

## 2018-02-08 RX ORDER — PROCHLORPERAZINE MALEATE 5 MG
TABLET ORAL
Qty: 90 TABLET | Refills: 0 | Status: SHIPPED | OUTPATIENT
Start: 2018-02-08 | End: 2018-05-07

## 2018-02-13 DIAGNOSIS — G43.009 MIGRAINE WITHOUT AURA AND WITHOUT STATUS MIGRAINOSUS, NOT INTRACTABLE: ICD-10-CM

## 2018-02-14 NOTE — TELEPHONE ENCOUNTER
Inderal       Last Written Prescription Date:  12/18/2017  Last Fill Quantity: 30,   # refills: 0  Last Office Visit: 4/07/2017  Future Office visit:

## 2018-02-15 RX ORDER — PROPRANOLOL HYDROCHLORIDE 80 MG/1
CAPSULE, EXTENDED RELEASE ORAL
Qty: 30 CAPSULE | Refills: 0 | Status: SHIPPED | OUTPATIENT
Start: 2018-02-15 | End: 2018-03-19

## 2018-02-28 DIAGNOSIS — J30.2 CHRONIC SEASONAL ALLERGIC RHINITIS, UNSPECIFIED TRIGGER: ICD-10-CM

## 2018-03-01 RX ORDER — LORATADINE AND PSEUDOEPHEDRINE SULFATE 10; 240 MG/1; MG/1
TABLET, FILM COATED, EXTENDED RELEASE ORAL
Qty: 30 TABLET | Refills: 0 | Status: SHIPPED | OUTPATIENT
Start: 2018-03-01 | End: 2018-03-30

## 2018-03-05 ENCOUNTER — ALLIED HEALTH/NURSE VISIT (OUTPATIENT)
Dept: ALLERGY | Facility: OTHER | Age: 33
End: 2018-03-05
Attending: FAMILY MEDICINE
Payer: COMMERCIAL

## 2018-03-05 DIAGNOSIS — Z30.42 ENCOUNTER FOR MANAGEMENT AND INJECTION OF INJECTABLE PROGESTIN CONTRACEPTIVE: ICD-10-CM

## 2018-03-05 DIAGNOSIS — G43.009 MIGRAINE WITHOUT AURA AND WITHOUT STATUS MIGRAINOSUS, NOT INTRACTABLE: ICD-10-CM

## 2018-03-05 PROCEDURE — 96372 THER/PROPH/DIAG INJ SC/IM: CPT

## 2018-03-05 NOTE — MR AVS SNAPSHOT
"              After Visit Summary   3/5/2018    Aubree Corcoran    MRN: [id number]           Patient Information     Date Of Birth          1985        Visit Information        Provider Department      3/5/2018 10:45 AM  SHOT ROOM [place] [facility]        Today's Diagnoses     Encounter for management and injection of injectable progestin contraceptive           Follow-ups after your visit        Who to contact     If you have questions or need follow up information about today's clinic visit or your schedule please contact [facility] directly at [phone].  Normal or non-critical lab and imaging results will be communicated to you by MyChart, letter or phone within 4 business days after the clinic has received the results. If you do not hear from us within 7 days, please contact the clinic through [employer] or phone. If you have a critical or abnormal lab result, we will notify you by phone as soon as possible.  Submit refill requests through [email or web address] or call your pharmacy and they will forward the refill request to us. Please allow 3 business days for your refill to be completed.          Additional Information About Your Visit        MyChart Information     [email or web address] lets you send messages to your doctor, view your test results, renew your prescriptions, schedule appointments and more. To sign up, go to [email or web address] . Click on \"Log in\" on the left side of the screen, which will take you to the Welcome page. Then click on \"Sign up Now\" on the right side of the page.     You will be asked to enter the access code listed below, as well as some personal information. Please follow the directions to create your username and password.     Your access code is: [email or web address]  Expires: 6/3/2018 10:56 AM     Your access code will  in 90 days. If you need help or a new code, please call your [facility] or [phone].        Care EveryWhere ID     This is your Care EveryWhere " ID. This could be used by other organizations to access your [place] medical records  [id number]         Blood Pressure from Last 3 Encounters:   04/07/17 118/62    Weight from Last 3 Encounters:   04/07/17 181 lb 9.6 oz (82.4 kg)              We Performed the Following     C Medroxyprogesterone inj/1mg     INJECTION INTRAMUSCULAR OR SUB-Q        Primary Care Provider Office Phone # Fax #    Kendra Yanez [phone] [address]  [facility] [address]        Equal Access to Services     [facility]: Hadii aad ku hadasho Soomaali, waaxda luqadaha, qaybta kaalmada adeegyada, waxay scarletin hayaan adebrigida johnson . So [facility] [phone].    ATENCIÓN: Si habla español, tiene a mishra disposición servicios gratuitos de asistencia lingüística. [facility] [phone].    We comply with applicable federal civil rights laws and [place] laws. We do not discriminate on the basis of race, color, national origin, age, disability, sex, sexual orientation, or gender identity.            Thank you!     Thank you for choosing [facility]  for your care. Our goal is always to provide you with excellent care. Hearing back from our patients is one way we can continue to improve our services. Please take a few minutes to complete the written survey that you may receive in the mail after your visit with us. Thank you!             Your Updated Medication List - Protect others around you: Learn how to safely use, store and throw away your medicines at www.disposemymeds.org.          This list is accurate as of 3/5/18 10:56 AM.  Always use your most recent med list.                   Brand Name Dispense Instructions for use Diagnosis    ALLERGY RELIEF/NASAL DECONGEST  MG per 24 hr tablet   Generic drug:  loratadine-pseudoePHEDrine     30 tablet    TAKE 1 TABLET BY MOUTH DAILY    Chronic seasonal allergic rhinitis, unspecified trigger       dicyclomine 20 MG tablet    BENTYL    120 tablet     TAKE 1 TABLET BY MOUTH 3 TIMES A DAY    Irritable bowel syndrome with both constipation and diarrhea       docusate sodium 100 MG capsule    COLACE     Take 200 mg by mouth        medroxyPROGESTERone 150 MG/ML injection    DEPO-PROVERA    0.9 mL    Inject 1 mL (150 mg) into the muscle every 3 months    Encounter for management and injection of injectable progestin contraceptive       pantoprazole 40 MG EC tablet    PROTONIX    30 tablet    TAKE 1 TABLET BY MOUTH DAILY    Gastroesophageal reflux disease, esophagitis presence not specified       prochlorperazine 5 MG tablet    COMPAZINE    90 tablet    TAKE 1 TO 2 TABLETS BY MOUTH EVERY 8 HOURS AS NEEDED FOR NAUSEA    Migraine without aura and without status migrainosus, not intractable       propranolol 80 MG 24 hr capsule    INDERAL LA    30 capsule    TAKE 1 CAPSULE BY MOUTH DAILY    Migraine without aura and without status migrainosus, not intractable       SUMAtriptan 100 MG tablet    IMITREX    9 tablet    TAKE 1 TABLET BY MOUTH AT ONSET OF HEADACHE FOR MIGRAINE    Migraine without aura and without status migrainosus, not intractable

## 2018-03-05 NOTE — PROGRESS NOTES
The following medication was given:     MEDICATION: Depo Provera 150mg  ROUTE: IM  SITE: [facility]  DOSE: 1 ml  LOT #: [id number]  :  [employer]   EXPIRATION DATE:  4/2020  NDC#: [address]-7  Pt will return 5/21-6/4/18  Zakiya Turcios

## 2018-03-06 RX ORDER — SUMATRIPTAN 100 MG/1
TABLET, FILM COATED ORAL
Qty: 9 TABLET | Refills: 0 | Status: SHIPPED | OUTPATIENT
Start: 2018-03-06 | End: 2018-03-12

## 2018-03-30 DIAGNOSIS — J30.2 CHRONIC SEASONAL ALLERGIC RHINITIS, UNSPECIFIED TRIGGER: ICD-10-CM

## 2018-03-30 RX ORDER — LORATADINE AND PSEUDOEPHEDRINE SULFATE 10; 240 MG/1; MG/1
TABLET, FILM COATED, EXTENDED RELEASE ORAL
Qty: 30 TABLET | Refills: 0 | Status: SHIPPED | OUTPATIENT
Start: 2018-03-30 | End: 2018-10-29

## 2018-04-04 ENCOUNTER — OFFICE VISIT (OUTPATIENT)
Dept: FAMILY MEDICINE | Facility: OTHER | Age: 33
End: 2018-04-04
Attending: FAMILY MEDICINE
Payer: COMMERCIAL

## 2018-04-04 VITALS
OXYGEN SATURATION: 98 % | DIASTOLIC BLOOD PRESSURE: 80 MMHG | BODY MASS INDEX: 31.31 KG/M2 | HEIGHT: 64 IN | TEMPERATURE: 97.5 F | HEART RATE: 84 BPM | SYSTOLIC BLOOD PRESSURE: 126 MMHG | RESPIRATION RATE: 12 BRPM | WEIGHT: 183.4 LBS

## 2018-04-04 DIAGNOSIS — F41.9 ANXIETY: ICD-10-CM

## 2018-04-04 DIAGNOSIS — G43.009 MIGRAINE WITHOUT AURA AND WITHOUT STATUS MIGRAINOSUS, NOT INTRACTABLE: ICD-10-CM

## 2018-04-04 DIAGNOSIS — Z71.6 ENCOUNTER FOR TOBACCO USE CESSATION COUNSELING: ICD-10-CM

## 2018-04-04 DIAGNOSIS — F17.200 TOBACCO USE DISORDER: ICD-10-CM

## 2018-04-04 DIAGNOSIS — F33.9 EPISODE OF RECURRENT MAJOR DEPRESSIVE DISORDER, UNSPECIFIED DEPRESSION EPISODE SEVERITY (H): Primary | ICD-10-CM

## 2018-04-04 PROCEDURE — 99214 OFFICE O/P EST MOD 30 MIN: CPT | Performed by: FAMILY MEDICINE

## 2018-04-04 RX ORDER — PROPRANOLOL HYDROCHLORIDE 80 MG/1
80 CAPSULE, EXTENDED RELEASE ORAL
COMMUNITY
Start: 2017-06-01 | End: 2018-04-04

## 2018-04-04 RX ORDER — DICYCLOMINE HCL 20 MG
20 TABLET ORAL 3 TIMES DAILY
COMMUNITY
End: 2018-04-04

## 2018-04-04 RX ORDER — SUMATRIPTAN 100 MG/1
TABLET, FILM COATED ORAL
Qty: 9 TABLET | Refills: 1 | Status: SHIPPED | OUTPATIENT
Start: 2018-04-04 | End: 2018-06-04

## 2018-04-04 ASSESSMENT — PAIN SCALES - GENERAL: PAINLEVEL: NO PAIN (0)

## 2018-04-04 NOTE — NURSING NOTE
"Chief Complaint   Patient presents with     Refill Request       Initial /80 (BP Location: Right arm, Patient Position: Sitting, Cuff Size: Adult Regular)  Pulse 84  Temp 97.5  F (36.4  C) (Tympanic)  Resp 12  Ht 5' 3.5\" (1.613 m)  Wt 183 lb 6.4 oz (83.2 kg)  SpO2 98%  BMI 31.98 kg/m2 Estimated body mass index is 31.98 kg/(m^2) as calculated from the following:    Height as of this encounter: 5' 3.5\" (1.613 m).    Weight as of this encounter: 183 lb 6.4 oz (83.2 kg).  Medication Reconciliation: complete   Sujatha Kidd    "

## 2018-04-04 NOTE — MR AVS SNAPSHOT
After Visit Summary   4/4/2018    Aubree Corcoran    MRN: [id number]           Patient Information     Date Of Birth          1985        Visit Information        Provider Department      4/4/2018 11:30 AM Kendra Yanez MD [facility]        Today's Diagnoses     Episode of recurrent major depressive disorder, unspecified depression episode severity (H)    -  1    Anxiety        Tobacco use disorder        Encounter for tobacco use cessation counseling          Care Instructions    Start Zoloft 25 mg daily increasing to 50 mg after 1-2 weeks if tolerating and appropriate.  Referral for counseling services here.  See list below as well.  Follow up 1 month, sooner if concerns.    Psychologists/ counselors  [place]  [phone]  Dr. Harvey Chavez [phone]  [facility]  [phone]  [facility] [phone]  Octavio Genein  [phone]   [employer]  [phone]  (kids)  [employer] [phone]  (teens)  [facility]   [phone]  Counseling  [facility] [phone]  [facility] [phone]  [facility]      [phone]  [facility] [phone]  [facility]  [phone]  [facility]     [phone]   [facility] counseling [phone]  Fabby Chavez  [phone]  Ced Coyne [phone]  Cara Davis [phone]  [facility]     [phone]  [facility] Psych/ Health & Wellness     [phone]  Harpal Flynn  [phone]  [facility]     [phone]  [facility] [address] HARPAL Zavala     [phone]  [facility] Psychological Services     [phone]                        Follow-ups after your visit        Additional Services     MENTAL HEALTH REFERRAL  - Adult; Outpatient Treatment; Individual/Couples/Family/Group Therapy/Health Psychology; Range: Counseling Clinic   [place], [place],  "[place] [phone]; We will contact you to schedule the appointment or please call ...       All scheduling is subject to the client's specific insurance plan & benefits, provider/location availability, and provider clinical specialities.  Please arrive 15 minutes early for your first appointment and bring your completed paperwork.    Please be aware that coverage of these services is subject to the terms and limitations of your health insurance plan.  Call member services at your health plan with any benefit or coverage questions.                            Your next 10 appointments already scheduled     May 04, 2018  4:00 PM CDT   (Arrive by 3:45 PM)   SHORT with Kendra Yanez MD   [facility] ([facility] )    [address]  [facility] [address] [phone]           Please have the patient arrive 15 minutes early.              Who to contact     If you have questions or need follow up information about today's clinic visit or your schedule please contact [facility] directly at [phone].  Normal or non-critical lab and imaging results will be communicated to you by SecretBuildershart, letter or phone within 4 business days after the clinic has received the results. If you do not hear from us within 7 days, please contact the clinic through [employer] or phone. If you have a critical or abnormal lab result, we will notify you by phone as soon as possible.  Submit refill requests through [employer] or call your pharmacy and they will forward the refill request to us. Please allow 3 business days for your refill to be completed.          Additional Information About Your Visit        STEG[employer]S Information     [employer] lets you send messages to your doctor, view your test results, renew your prescriptions, schedule appointments and more. To sign up, go to [email or web address] . Click on \"Log in\" on the left side of the screen, which will take you to the Welcome page. " "Then click on \"Sign up Now\" on the right side of the page.     You will be asked to enter the access code listed below, as well as some personal information. Please follow the directions to create your username and password.     Your access code is: [email or web address]  Expires: 6/3/2018 11:56 AM     Your access code will  in 90 days. If you need help or a new code, please call your [facility] clinic or [phone].        Care EveryWhere ID     This is your Care EveryWhere ID. This could be used by other organizations to access your [facility] medical records  [id number]        Your Vitals Were     Pulse Temperature Respirations Height Pulse Oximetry BMI (Body Mass Index)    84 97.5  F (36.4  C) (Tympanic) 12 5' 3.5\" (1.613 m) 98% 31.98 kg/m2       Blood Pressure from Last 3 Encounters:   18 126/80   17 118/62    Weight from Last 3 Encounters:   18 183 lb 6.4 oz (83.2 kg)   17 181 lb 9.6 oz (82.4 kg)              We Performed the Following     MENTAL HEALTH REFERRAL  - Adult; Outpatient Treatment; Individual/Couples/Family/Group Therapy/Health Psychology; Range: Counseling Clinic   [facility] [phone]; We will contact you to schedule the appointment or please call ...          Today's Medication Changes          These changes are accurate as of 18 12:04 PM.  If you have any questions, ask your nurse or doctor.               Start taking these medicines.        Dose/Directions    sertraline 50 MG tablet   Commonly known as:  ZOLOFT   Used for:  Episode of recurrent major depressive disorder, unspecified depression episode severity (H), Anxiety   Started by:  Kendra Yanez MD        Take 1/2 tablet (25 mg) for 1-2 weeks, then increase to 1 tablet orally daily   Quantity:  30 tablet   Refills:  1         Stop taking these medicines if you haven't already. Please contact your care team if you have questions.     docusate sodium 100 MG capsule   Commonly known as:  " COLACE   Stopped by:  Kendra Yanez MD                Where to get your medicines      These medications were sent to [facility] - [place], [place] - [address]  [address] [facility] [place] [address]     Phone:  [phone]     sertraline 50 MG tablet                Primary Care Provider Office Phone # Fax #    Kendra Yanez [phone]       [address]  [place] [address]        Equal Access to Services     [facility]: Hadii aad ku hadasho Soomaali, waaxda luqadaha, qaybta kaalmada adeegyada, waxay idiin hayaan adeeg kharash layousuf . So [facility] [phone].    ATENCIÓN: Si habla español, tiene a mishra disposición servicios gratuitos de asistencia lingüística. [facility] [phone].    We comply with applicable federal civil rights laws and [place] laws. We do not discriminate on the basis of race, color, national origin, age, disability, sex, sexual orientation, or gender identity.            Thank you!     Thank you for choosing [facility]  for your care. Our goal is always to provide you with excellent care. Hearing back from our patients is one way we can continue to improve our services. Please take a few minutes to complete the written survey that you may receive in the mail after your visit with us. Thank you!             Your Updated Medication List - Protect others around you: Learn how to safely use, store and throw away your medicines at www.disposemymeds.org.          This list is accurate as of 4/4/18 12:04 PM.  Always use your most recent med list.                   Brand Name Dispense Instructions for use Diagnosis    * loratadine-pseudoePHEDrine  MG per 24 hr tablet    CLARITIN-D 24-hour     TAKE 1 TABLET DAILY BY MOUTH - GENERIC FOR CLARITIN-D        * ALLERGY RELIEF/NASAL DECONGEST  MG per 24 hr tablet   Generic drug:  loratadine-pseudoePHEDrine     30 tablet    TAKE 1 TABLET BY MOUTH DAILY    Chronic seasonal allergic  rhinitis, unspecified trigger       dicyclomine 20 MG tablet    BENTYL    120 tablet    TAKE 1 TABLET BY MOUTH 3 TIMES A DAY    Irritable bowel syndrome with both constipation and diarrhea       medroxyPROGESTERone 150 MG/ML injection    DEPO-PROVERA    0.9 mL    Inject 1 mL (150 mg) into the muscle every 3 months    Encounter for management and injection of injectable progestin contraceptive       pantoprazole 40 MG EC tablet    PROTONIX    30 tablet    TAKE 1 TABLET BY MOUTH DAILY    Gastroesophageal reflux disease, esophagitis presence not specified       prochlorperazine 5 MG tablet    COMPAZINE    90 tablet    TAKE 1 TO 2 TABLETS BY MOUTH EVERY 8 HOURS AS NEEDED FOR NAUSEA    Migraine without aura and without status migrainosus, not intractable       propranolol 80 MG 24 hr capsule    INDERAL LA    30 capsule    TAKE 1 CAPSULE BY MOUTH DAILY    Migraine without aura and without status migrainosus, not intractable       sertraline 50 MG tablet    ZOLOFT    30 tablet    Take 1/2 tablet (25 mg) for 1-2 weeks, then increase to 1 tablet orally daily    Episode of recurrent major depressive disorder, unspecified depression episode severity (H), Anxiety       SUMAtriptan 100 MG tablet    IMITREX    9 tablet    TAKE 1 TABLET BY MOUTH AT ONSET OF HEADACHE FOR MIGRAINE    Migraine without aura and without status migrainosus, not intractable       * Notice:  This list has 2 medication(s) that are the same as other medications prescribed for you. Read the directions carefully, and ask your doctor or other care provider to review them with you.

## 2018-04-04 NOTE — PATIENT INSTRUCTIONS
Start Zoloft 25 mg daily increasing to 50 mg after 1-2 weeks if tolerating and appropriate.  Referral for counseling services here.  See list below as well.  Follow up 1 month, sooner if concerns.    Psychologists/ counselors  [facility]  [phone]  Dr. Harvey Chavez [phone]  Katelyn Minds  [phone]  [facility] [phone]  Octavio Garcia  [phone]   [employer]  [phone]  (kids)  [employer] [phone]  (teens)  [facility]   [phone]  Counseling  [facility] [phone]  [facility] [phone]  [facility]      [phone]  [facility] [phone]  [facility]  [phone]  [facility]     [phone]   [facility] counseling [phone]  [facility]  [phone]  Ced Coyne [phone]  Cara Davis [phone]  [facility]     [phone]  [facility] Psych/ Health & Wellness     [phone]  [place]  Speedy Flynn  [phone]  [facility]     [phone]  [facility] Dr[address] HARPAL Zavala     [phone]  [facility] Psychological Services     [phone]

## 2018-04-30 DIAGNOSIS — J30.2 CHRONIC SEASONAL ALLERGIC RHINITIS, UNSPECIFIED TRIGGER: Primary | ICD-10-CM

## 2018-05-01 RX ORDER — LORATADINE AND PSEUDOEPHEDRINE SULFATE 10; 240 MG/1; MG/1
TABLET, FILM COATED, EXTENDED RELEASE ORAL
Qty: 30 TABLET | Refills: 0 | Status: SHIPPED | OUTPATIENT
Start: 2018-05-01 | End: 2018-09-06

## 2018-05-01 NOTE — TELEPHONE ENCOUNTER
Allergy relief      Last Written Prescription Date:  3/30/18  Last Fill Quantity: 30,   # refills: 0  Last Office Visit: 4/4/18  Future Office visit:    Next 5 appointments (look out 90 days)     May 04, 2018  4:00 PM CDT   (Arrive by 3:45 PM)   SHORT with Kendra Yanez MD   [facility] ([facility] )    [address] [place] [place] [address]   [phone]

## 2018-05-01 NOTE — TELEPHONE ENCOUNTER
ALLERGY RELIEF/NASAL DECONGEST  MG per 24 hr tablet      Last refill date 3/30/18    Last office visit 4/4/18

## 2018-05-04 ENCOUNTER — OFFICE VISIT (OUTPATIENT)
Dept: FAMILY MEDICINE | Facility: OTHER | Age: 33
End: 2018-05-04
Attending: FAMILY MEDICINE
Payer: COMMERCIAL

## 2018-05-04 VITALS
TEMPERATURE: 97.8 F | OXYGEN SATURATION: 97 % | RESPIRATION RATE: 16 BRPM | DIASTOLIC BLOOD PRESSURE: 76 MMHG | HEIGHT: 64 IN | SYSTOLIC BLOOD PRESSURE: 126 MMHG | WEIGHT: 183 LBS | BODY MASS INDEX: 31.24 KG/M2 | HEART RATE: 88 BPM

## 2018-05-04 DIAGNOSIS — F17.200 TOBACCO USE DISORDER: ICD-10-CM

## 2018-05-04 DIAGNOSIS — F41.9 ANXIETY: Primary | ICD-10-CM

## 2018-05-04 DIAGNOSIS — F33.9 EPISODE OF RECURRENT MAJOR DEPRESSIVE DISORDER, UNSPECIFIED DEPRESSION EPISODE SEVERITY (H): ICD-10-CM

## 2018-05-04 DIAGNOSIS — Z71.6 ENCOUNTER FOR TOBACCO USE CESSATION COUNSELING: ICD-10-CM

## 2018-05-04 PROCEDURE — 99213 OFFICE O/P EST LOW 20 MIN: CPT | Performed by: FAMILY MEDICINE

## 2018-05-04 RX ORDER — SERTRALINE HYDROCHLORIDE 100 MG/1
100 TABLET, FILM COATED ORAL DAILY
Qty: 30 TABLET | Refills: 2 | Status: SHIPPED | OUTPATIENT
Start: 2018-05-04 | End: 2018-07-31

## 2018-05-04 ASSESSMENT — PAIN SCALES - GENERAL: PAINLEVEL: NO PAIN (0)

## 2018-05-04 NOTE — NURSING NOTE
"/76 (BP Location: Left arm, Patient Position: Sitting, Cuff Size: Adult Regular)  Pulse 88  Temp 97.8  F (36.6  C) (Tympanic)  Resp 16  Ht 5' 3.5\" (1.613 m)  Wt 183 lb (83 kg)  SpO2 97%  BMI 31.91 kg/m2      Tanja Zhao    "

## 2018-05-04 NOTE — PATIENT INSTRUCTIONS
Increase to 100 mg daily.  Follow up 1-2 months, sooner if concerns.  Follow through with counseling.  Smoking cessation advised.

## 2018-05-04 NOTE — MR AVS SNAPSHOT
"              After Visit Summary   5/4/2018    Aubree Corcoran    MRN: [id number]           Patient Information     Date Of Birth          1985        Visit Information        Provider Department      5/4/2018 4:00 PM Kendra Yanez MD [facility]        Today's Diagnoses     Anxiety    -  1    Episode of recurrent major depressive disorder, unspecified depression episode severity (H)        Tobacco use disorder        Encounter for tobacco use cessation counseling          Care Instructions    Increase to 100 mg daily.  Follow up 1-2 months, sooner if concerns.  Follow through with counseling.  Smoking cessation advised.            Follow-ups after your visit        Who to contact     If you have questions or need follow up information about today's clinic visit or your schedule please contact [facility] directly at [phone].  Normal or non-critical lab and imaging results will be communicated to you by TILE Financialhart, letter or phone within 4 business days after the clinic has received the results. If you do not hear from us within 7 days, please contact the clinic through MyChart or phone. If you have a critical or abnormal lab result, we will notify you by phone as soon as possible.  Submit refill requests through [employer] or call your pharmacy and they will forward the refill request to us. Please allow 3 business days for your refill to be completed.          Additional Information About Your Visit        TILE Financial[employer]swired Information     [employer] lets you send messages to your doctor, view your test results, renew your prescriptions, schedule appointments and more. To sign up, go to [email or web address] . Click on \"Log in\" on the left side of the screen, which will take you to the Welcome page. Then click on \"Sign up Now\" on the right side of the page.     You will be asked to enter the access code listed below, as well as some personal information. Please follow the directions to " "create your username and password.     Your access code is: [email or web address]  Expires: 6/3/2018 11:56 AM     Your access code will  in 90 days. If you need help or a new code, please call your [facility] or [phone].        Care EveryWhere ID     This is your Care EveryWhere ID. This could be used by other organizations to access your [place] medical records  [id number]        Your Vitals Were     Pulse Temperature Respirations Height Pulse Oximetry BMI (Body Mass Index)    88 97.8  F (36.6  C) (Tympanic) 16 5' 3.5\" (1.613 m) 97% 31.91 kg/m2       Blood Pressure from Last 3 Encounters:   18 126/76   18 126/80   17 118/62    Weight from Last 3 Encounters:   18 183 lb (83 kg)   18 183 lb 6.4 oz (83.2 kg)   17 181 lb 9.6 oz (82.4 kg)              Today, you had the following     No orders found for display         Today's Medication Changes          These changes are accurate as of 18  4:27 PM.  If you have any questions, ask your nurse or doctor.               These medicines have changed or have updated prescriptions.        Dose/Directions    sertraline 100 MG tablet   Commonly known as:  ZOLOFT   This may have changed:    - medication strength  - how much to take  - how to take this  - when to take this  - additional instructions   Used for:  Episode of recurrent major depressive disorder, unspecified depression episode severity (H), Anxiety   Changed by:  Kendra Yanez MD        Dose:  100 mg   Take 1 tablet (100 mg) by mouth daily   Quantity:  30 tablet   Refills:  2            Where to get your medicines      These medications were sent to [facility] - [place] - [address]  [address] [place] [address]     Phone:  [phone]     sertraline 100 MG tablet                Primary Care Provider Office Phone # Fax #    Kendra Yanez [phone] [address] [address]        Equal Access " to Services     [facility] : Gallo Zaragoza, wacary leary, qaybamanda kaalmachoco martinez. So [facility] [phone].    ATENCIÓN: Si beto gupta, tiene a mishra disposición servicios gratuitos de asistencia lingüística. Llame al [phone].    We comply with applicable federal civil rights laws and [place] laws. We do not discriminate on the basis of race, color, national origin, age, disability, sex, sexual orientation, or gender identity.            Thank you!     Thank you for choosing [facility]  for your care. Our goal is always to provide you with excellent care. Hearing back from our patients is one way we can continue to improve our services. Please take a few minutes to complete the written survey that you may receive in the mail after your visit with us. Thank you!             Your Updated Medication List - Protect others around you: Learn how to safely use, store and throw away your medicines at www.disposemymeds.org.          This list is accurate as of 5/4/18  4:27 PM.  Always use your most recent med list.                   Brand Name Dispense Instructions for use Diagnosis    * loratadine-pseudoePHEDrine  MG per 24 hr tablet    CLARITIN-D 24-hour     TAKE 1 TABLET DAILY BY MOUTH - GENERIC FOR CLARITIN-D        * ALLERGY RELIEF/NASAL DECONGEST  MG per 24 hr tablet   Generic drug:  loratadine-pseudoePHEDrine     30 tablet    TAKE 1 TABLET BY MOUTH DAILY    Chronic seasonal allergic rhinitis, unspecified trigger       * ALLERGY RELIEF/NASAL DECONGEST  MG per 24 hr tablet   Generic drug:  loratadine-pseudoePHEDrine     30 tablet    TAKE 1 TABLET BY MOUTH DAILY    Chronic seasonal allergic rhinitis, unspecified trigger       dicyclomine 20 MG tablet    BENTYL    120 tablet    TAKE 1 TABLET BY MOUTH 3 TIMES A DAY    Irritable bowel syndrome with both constipation and diarrhea       medroxyPROGESTERone 150 MG/ML injection     DEPO-PROVERA    0.9 mL    Inject 1 mL (150 mg) into the muscle every 3 months    Encounter for management and injection of injectable progestin contraceptive       pantoprazole 40 MG EC tablet    PROTONIX    30 tablet    TAKE 1 TABLET BY MOUTH DAILY    Gastroesophageal reflux disease, esophagitis presence not specified       prochlorperazine 5 MG tablet    COMPAZINE    90 tablet    TAKE 1 TO 2 TABLETS BY MOUTH EVERY 8 HOURS AS NEEDED FOR NAUSEA    Migraine without aura and without status migrainosus, not intractable       propranolol 80 MG 24 hr capsule    INDERAL LA    30 capsule    TAKE 1 CAPSULE BY MOUTH DAILY    Migraine without aura and without status migrainosus, not intractable       sertraline 100 MG tablet    ZOLOFT    30 tablet    Take 1 tablet (100 mg) by mouth daily    Episode of recurrent major depressive disorder, unspecified depression episode severity (H), Anxiety       SUMAtriptan 100 MG tablet    IMITREX    9 tablet    TAKE 1 TABLET BY MOUTH AT ONSET OF HEADACHE FOR MIGRAINE    Migraine without aura and without status migrainosus, not intractable       * Notice:  This list has 3 medication(s) that are the same as other medications prescribed for you. Read the directions carefully, and ask your doctor or other care provider to review them with you.

## 2018-05-04 NOTE — PROGRESS NOTES
SUBJECTIVE:                                                    Aubree Corcoran is a 32 year old female who presents to clinic today for the following health issues:      Depression and Anxiety Follow-Up    Status since last visit: Improved Anxiety is better and management has noticed the change.    Other associated symptoms:None    Complicating factors:     Significant life event: Yes-  Mom has been real sick as in patient for a while.     Current substance abuse: None    Feels Zoloft has helped since starting last month; feels less anxious; manager and supervisor have noticed improvement; denies side effects; does feel it could still be improved and is requesting dose increase    Still smoking - not ready to quit        PHQ-9 4/7/2017   Total Score 0   Q9: Suicide Ideation Not at all     JESSICA-7 SCORE 4/7/2017   Total Score 0     PATIENT REFUSING PHQ/JESSICA TODAY.  WORKS IN HIM AND STATES COWORKERS WILL SEE.    PHQ-9  English  PHQ-9   Any Language  JESSICA-7  Suicide Assessment Five-step Evaluation and Treatment (SAFE-T)    Amount of exercise or physical activity: None    Problems taking medications regularly: No    Medication side effects: none    Diet: regular (no restrictions)          Problem list and histories reviewed & adjusted, as indicated.  Additional history: as documented    Current Outpatient Prescriptions   Medication     ALLERGY RELIEF/NASAL DECONGEST  MG per 24 hr tablet     ALLERGY RELIEF/NASAL DECONGEST  MG per 24 hr tablet     dicyclomine (BENTYL) 20 MG tablet     loratadine-pseudoePHEDrine (CLARITIN-D 24-HOUR)  MG per 24 hr tablet     medroxyPROGESTERone (DEPO-PROVERA) 150 MG/ML injection     pantoprazole (PROTONIX) 40 MG EC tablet     prochlorperazine (COMPAZINE) 5 MG tablet     propranolol (INDERAL LA) 80 MG 24 hr capsule     sertraline (ZOLOFT) 100 MG tablet     SUMAtriptan (IMITREX) 100 MG tablet     [DISCONTINUED] sertraline (ZOLOFT) 50 MG tablet     No current  "facility-administered medications for this visit.        Patient Active Problem List   Diagnosis     ACP (advance care planning)     Tobacco use disorder     Migraine without aura and without status migrainosus, not intractable     Gastroesophageal reflux disease, esophagitis presence not specified     Irritable bowel syndrome with both constipation and diarrhea     Family history of heart disease in female family member before age 65     Family history of heart disease in male family member before age 55     Past Surgical History:   Procedure Laterality Date     ORTHOPEDIC SURGERY  2002    ankle surgery- bone spur removal       Social History   Substance Use Topics     Smoking status: Current Every Day Smoker     Packs/day: 0.50     Years: 15.00     Types: Cigarettes     Smokeless tobacco: Never Used     Alcohol use No     Family History   Problem Relation Age of Onset     HEART DISEASE Mother      8 stents; onset 40s?     Chronic Obstructive Pulmonary Disease Mother      CANCER Father      mouth and throat cancer     HEART DISEASE Brother      stents; age 37; occlusive disease     Bladder Cancer Brother            ROS:  CONSTITUTIONAL:NEGATIVE for fever, chills, change in weight  INTEGUMENTARY/SKIN: NEGATIVE for worrisome rashes  RESP:NEGATIVE for significant cough or SOB  CV: NEGATIVE for chest pain, palpitations or peripheral edema  GI: NEGATIVE for nausea, abdominal pain, heartburn, or change in bowel habits  MUSCULOSKELETAL: NEGATIVE for significant arthralgias or myalgia  PSYCHIATRIC: as above    OBJECTIVE:     /76 (BP Location: Left arm, Patient Position: Sitting, Cuff Size: Adult Regular)  Pulse 88  Temp 97.8  F (36.6  C) (Tympanic)  Resp 16  Ht 5' 3.5\" (1.613 m)  Wt 183 lb (83 kg)  SpO2 97%  BMI 31.91 kg/m2  Body mass index is 31.91 kg/(m^2).  GENERAL: healthy, alert and no distress  RESP: lungs clear to auscultation - no rales, rhonchi or wheezes  CV: regular rate and rhythm, normal S1 S2, no " S3 or S4, no murmur, click or rub, no peripheral edema and peripheral pulses strong  MS: no gross musculoskeletal defects noted, no edema  PSYCH: mentation appears normal, affect normal/bright      ASSESSMENT/PLAN:     (F41.9) Anxiety  (primary encounter diagnosis)  Plan: sertraline (ZOLOFT) 100 MG tablet    (F33.9) Episode of recurrent major depressive disorder, unspecified depression episode severity (H)  Plan: sertraline (ZOLOFT) 100 MG tablet    (F17.200) Tobacco use disorder  (Z71.6) Encounter for tobacco use cessation counseling    Responding nicely to Zoloft.  Trial of increased dose.    Patient Instructions   Increase to 100 mg daily.  Follow up 1-2 months, sooner if concerns.  Follow through with counseling.  Smoking cessation advised.              Kendra Bailey MD  [facility]

## 2018-05-22 DIAGNOSIS — Z30.42 ENCOUNTER FOR MANAGEMENT AND INJECTION OF INJECTABLE PROGESTIN CONTRACEPTIVE: ICD-10-CM

## 2018-05-22 NOTE — TELEPHONE ENCOUNTER
Additional orders are needed for Depo Provera injections.  The patient is scheduled for tomorrow in the shot room.    The order is prepared for you to sign, should you wish her to continue.  Thank you, Vivien for addressing this refill for Dr. Yanez who is out of the office today.

## 2018-05-23 ENCOUNTER — ALLIED HEALTH/NURSE VISIT (OUTPATIENT)
Dept: ALLERGY | Facility: OTHER | Age: 33
End: 2018-05-23
Attending: FAMILY MEDICINE
Payer: COMMERCIAL

## 2018-05-23 DIAGNOSIS — Z30.42 ENCOUNTER FOR SURVEILLANCE OF INJECTABLE CONTRACEPTIVE: Primary | ICD-10-CM

## 2018-05-23 PROCEDURE — 96372 THER/PROPH/DIAG INJ SC/IM: CPT

## 2018-05-23 RX ORDER — MEDROXYPROGESTERONE ACETATE 150 MG/ML
150 INJECTION, SUSPENSION INTRAMUSCULAR
Qty: 0.9 ML | Refills: 3 | OUTPATIENT
Start: 2018-05-23 | End: 2022-01-26

## 2018-05-23 NOTE — MR AVS SNAPSHOT
After Visit Summary   5/23/2018    Aubree Corcoran    MRN: [id number]           Patient Information     Date Of Birth          1985        Visit Information        Provider Department      5/23/2018 1:15 PM HC SHOT ROOM [place] [facility] [place]        Today's Diagnoses     Encounter for surveillance of injectable contraceptive    -  1       Follow-ups after your visit        Who to contact     If you have questions or need follow up information about today's clinic visit or your schedule please [facility] at [phone].  Normal or non-critical lab and imaging results will be communicated to you by MyChart, letter or phone within 4 business days after the clinic has received the results. If you do not hear from us within 7 days, please contact the clinic through MyChart or phone. If you have a critical or abnormal lab result, we will notify you by phone as soon as possible.  Submit refill requests through [employer] or call your pharmacy and they will forward the refill request to us. Please allow 3 business days for your refill to be completed.          Additional Information About Your Visit        Care EveryWhere ID     This is your Care EveryWhere ID. This could be used by other organizations to access your [place] medical records  [id number]         Blood Pressure from Last 3 Encounters:   05/04/18 126/76   04/04/18 126/80   04/07/17 118/62    Weight from Last 3 Encounters:   05/04/18 183 lb (83 kg)   04/04/18 183 lb 6.4 oz (83.2 kg)   04/07/17 181 lb 9.6 oz (82.4 kg)              We Performed the Following     INJECTION INTRAMUSCULAR OR SUB-Q     Medroxyprogesterone inj  1mg   (Depo Provera J-Code)        Primary Care Provider Office Phone # Fax #    Kendra Yanez [phone] [address] [place] [address]        Equal Access to Services     PAWAN HEARD AH: wero Ryan, bhavya jimenez  choco schultebrigida vanessanoah colesaan ah. So [facility] [phone].    ATENCIÓN: Si beto gupta, tiene a mishra disposición servicios gratuitos de asistencia lingüística. Sulaiman al [phone].    We comply with applicable federal civil rights laws and [place] laws. We do not discriminate on the basis of race, color, national origin, age, disability, sex, sexual orientation, or gender identity.            Thank you!     [facility]  [facility]. Our goal is always to provide you with excellent care. Hearing back from our patients is one way we can continue to improve our services. Please take a few minutes to complete the written survey that you may receive in the mail after your visit with us. Thank you!             Your Updated Medication List - Protect others around you: Learn how to safely use, store and throw away your medicines at www.disposemymeds.org.          This list is accurate as of 5/23/18  1:27 PM.  Always use your most recent med list.                   Brand Name Dispense Instructions for use Diagnosis    * loratadine-pseudoePHEDrine  MG per 24 hr tablet    CLARITIN-D 24-hour     TAKE 1 TABLET DAILY BY MOUTH - GENERIC FOR CLARITIN-D        * ALLERGY RELIEF/NASAL DECONGEST  MG per 24 hr tablet   Generic drug:  loratadine-pseudoePHEDrine     30 tablet    TAKE 1 TABLET BY MOUTH DAILY    Chronic seasonal allergic rhinitis, unspecified trigger       * ALLERGY RELIEF/NASAL DECONGEST  MG per 24 hr tablet   Generic drug:  loratadine-pseudoePHEDrine     30 tablet    TAKE 1 TABLET BY MOUTH DAILY    Chronic seasonal allergic rhinitis, unspecified trigger       dicyclomine 20 MG tablet    BENTYL    120 tablet    TAKE 1 TABLET BY MOUTH 3 TIMES A DAY    Irritable bowel syndrome with both constipation and diarrhea       medroxyPROGESTERone 150 MG/ML injection    DEPO-PROVERA    0.9 mL    Inject 1 mL (150 mg) into the muscle every 3 months    Encounter  for management and injection of injectable progestin contraceptive       pantoprazole 40 MG EC tablet    PROTONIX    30 tablet    TAKE 1 TABLET BY MOUTH DAILY    Gastroesophageal reflux disease, esophagitis presence not specified       prochlorperazine 5 MG tablet    COMPAZINE    90 tablet    TAKE 1 TO 2 TABLETS BY MOUTH EVERY 8 HOURS AS NEEDED FOR NAUSEA    Migraine without aura and without status migrainosus, not intractable       propranolol 80 MG 24 hr capsule    INDERAL LA    30 capsule    TAKE 1 CAPSULE BY MOUTH DAILY    Migraine without aura and without status migrainosus, not intractable       sertraline 100 MG tablet    ZOLOFT    30 tablet    Take 1 tablet (100 mg) by mouth daily    Episode of recurrent major depressive disorder, unspecified depression episode severity (H), Anxiety       SUMAtriptan 100 MG tablet    IMITREX    9 tablet    TAKE 1 TABLET BY MOUTH AT ONSET OF HEADACHE FOR MIGRAINE    Migraine without aura and without status migrainosus, not intractable       * Notice:  This list has 3 medication(s) that are the same as other medications prescribed for you. Read the directions carefully, and ask your doctor or other care provider to review them with you.

## 2018-05-23 NOTE — PROGRESS NOTES
Prior to injection verified patient identity using patient's name and date of birth.  Charley De La Paz

## 2018-06-04 DIAGNOSIS — G43.009 MIGRAINE WITHOUT AURA AND WITHOUT STATUS MIGRAINOSUS, NOT INTRACTABLE: ICD-10-CM

## 2018-06-04 DIAGNOSIS — J30.2 CHRONIC SEASONAL ALLERGIC RHINITIS, UNSPECIFIED TRIGGER: Primary | ICD-10-CM

## 2018-06-04 DIAGNOSIS — K58.2 IRRITABLE BOWEL SYNDROME WITH BOTH CONSTIPATION AND DIARRHEA: ICD-10-CM

## 2018-06-05 NOTE — TELEPHONE ENCOUNTER
Allergy Relief  Last Written Prescription Date: 5/1/18  Last Fill Quantity: 30 # of Refills: 0  Last Office Visit: 5/4/18    Bentyl  Last Written Prescription Date: 1/11/18  Last Fill Quantity: 120 # of Refills: 1  Last Office Visit: 5/4/18    Imitrex  Last Written Prescription Date: 4/4/18  Last Fill Quantity: 9 # of Refills: 1  Last Office Visit: 5/4/18

## 2018-06-06 RX ORDER — DICYCLOMINE HCL 20 MG
TABLET ORAL
Qty: 120 TABLET | Refills: 3 | Status: SHIPPED | OUTPATIENT
Start: 2018-06-06 | End: 2019-06-05

## 2018-06-06 RX ORDER — LORATADINE AND PSEUDOEPHEDRINE SULFATE 10; 240 MG/1; MG/1
TABLET, FILM COATED, EXTENDED RELEASE ORAL
Qty: 30 TABLET | Refills: 0 | Status: SHIPPED | OUTPATIENT
Start: 2018-06-06 | End: 2018-09-06

## 2018-06-06 RX ORDER — SUMATRIPTAN 100 MG/1
TABLET, FILM COATED ORAL
Qty: 9 TABLET | Refills: 3 | Status: SHIPPED | OUTPATIENT
Start: 2018-06-06 | End: 2018-08-02

## 2018-07-02 DIAGNOSIS — J30.2 CHRONIC SEASONAL ALLERGIC RHINITIS, UNSPECIFIED TRIGGER: Primary | ICD-10-CM

## 2018-07-02 NOTE — TELEPHONE ENCOUNTER
Allergy relief      Last Written Prescription Date:  6/6/18  Last Fill Quantity: 30,   # refills: 0  Last Office Visit: 5/4/18

## 2018-07-03 RX ORDER — LORATADINE AND PSEUDOEPHEDRINE SULFATE 10; 240 MG/1; MG/1
TABLET, FILM COATED, EXTENDED RELEASE ORAL
Qty: 30 TABLET | Refills: 3 | Status: SHIPPED | OUTPATIENT
Start: 2018-07-03 | End: 2018-09-06

## 2018-07-31 DIAGNOSIS — F41.9 ANXIETY: ICD-10-CM

## 2018-07-31 DIAGNOSIS — F33.9 EPISODE OF RECURRENT MAJOR DEPRESSIVE DISORDER, UNSPECIFIED DEPRESSION EPISODE SEVERITY (H): ICD-10-CM

## 2018-08-01 RX ORDER — SERTRALINE HYDROCHLORIDE 100 MG/1
TABLET, FILM COATED ORAL
Qty: 30 TABLET | Refills: 0 | Status: SHIPPED | OUTPATIENT
Start: 2018-08-01 | End: 2018-09-06

## 2018-08-01 NOTE — TELEPHONE ENCOUNTER
Zoloft       Last Written Prescription Date:  5/04/2018  Last Fill Quantity: 30,   # refills: 2  Last Office Visit: 5/04/2018  Future Office visit:    Next 5 appointments (look out 90 days)     Aug 15, 2018  4:00 PM CDT   (Arrive by 3:45 PM)   SHORT with Kendra Yanez MD   [facility] ([facility]    [address] [place] [address]   [phone]

## 2018-08-02 DIAGNOSIS — G43.009 MIGRAINE WITHOUT AURA AND WITHOUT STATUS MIGRAINOSUS, NOT INTRACTABLE: ICD-10-CM

## 2018-08-02 NOTE — TELEPHONE ENCOUNTER
Imitrex       Last Written Prescription Date:  6/06/2018  Last Fill Quantity: 9,   # refills: 3  Last Office Visit: 8/01/2018  Future Office visit:    Next 5 appointments (look out 90 days)     Aug 15, 2018  4:00 PM CDT   (Arrive by 3:45 PM)   SHORT with Kendra Yanez MD   [facility] ([facility]    [address] [place] [address]   [phone]

## 2018-08-03 RX ORDER — SUMATRIPTAN 100 MG/1
TABLET, FILM COATED ORAL
Qty: 9 TABLET | Refills: 0 | Status: SHIPPED | OUTPATIENT
Start: 2018-08-03 | End: 2018-08-20

## 2018-08-06 DIAGNOSIS — G43.009 MIGRAINE WITHOUT AURA AND WITHOUT STATUS MIGRAINOSUS, NOT INTRACTABLE: ICD-10-CM

## 2018-08-07 RX ORDER — PROCHLORPERAZINE MALEATE 5 MG
TABLET ORAL
Qty: 90 TABLET | Refills: 0 | Status: SHIPPED | OUTPATIENT
Start: 2018-08-07 | End: 2018-11-27

## 2018-08-07 NOTE — TELEPHONE ENCOUNTER
PROCHLORPERAZINE 5 MG TABLET    Last Written Prescription Date:  5/08/2018  Last Fill Quantity: 90,   # refills: 0  Last Office Visit: 5/04/2018  Future Office visit:    Next 5 appointments (look out 90 days)     Aug 15, 2018  4:00 PM CDT   (Arrive by 3:45 PM)   SHORT with Kendra Yanez MD   [facility] [place] ([facility] [place] )    [address] [place] [address]   [phone]

## 2018-08-15 ENCOUNTER — ALLIED HEALTH/NURSE VISIT (OUTPATIENT)
Dept: ALLERGY | Facility: OTHER | Age: 33
End: 2018-08-15
Attending: FAMILY MEDICINE
Payer: COMMERCIAL

## 2018-08-15 DIAGNOSIS — Z30.42 ENCOUNTER FOR SURVEILLANCE OF INJECTABLE CONTRACEPTIVE: Primary | ICD-10-CM

## 2018-08-15 PROCEDURE — 96372 THER/PROPH/DIAG INJ SC/IM: CPT

## 2018-08-15 NOTE — PROGRESS NOTES
The following medication was given:     MEDICATION: Depo Provera 150mg  ROUTE: IM  SITE: [facility]  DOSE: 1ml  LOT #: [id number]  :  Squid[employer]   EXPIRATION DATE:  06/2020  NDC#: [address]-3  Patient will return for next injection between 10/31/18 thru 11/14/18.    Missy Harding

## 2018-08-15 NOTE — MR AVS SNAPSHOT
After Visit Summary   8/15/2018    Aubree Corcorna    MRN: [id number]           Patient Information     Date Of Birth          1985        Visit Information        Provider Department      8/15/2018 2:00 PM HC SHOT ROOM [place] [facility] [place]        Today's Diagnoses     Encounter for surveillance of injectable contraceptive    -  1       Follow-ups after your visit        Your next 10 appointments already scheduled     Aug 27, 2018  4:00 PM CDT   (Arrive by 3:45 PM)   SHORT with Kendra Yanez MD   [facility] [place] ([facility] )    [address]  [place] [address]   [phone]           Please have the patient arrive 15 minutes early.              Who to contact     If you have questions or need follow up information about today's clinic visit or your schedule please contact [facility] directly at [phone].  Normal or non-critical lab and imaging results will be communicated to you by MyChart, letter or phone within 4 business days after the clinic has received the results. If you do not hear from us within 7 days, please contact the clinic through MyChart or phone. If you have a critical or abnormal lab result, we will notify you by phone as soon as possible.  Submit refill requests through [employer] or call your pharmacy and they will forward the refill request to us. Please allow 3 business days for your refill to be completed.          Additional Information About Your Visit        Care EveryWhere ID     This is your Care EveryWhere ID. This could be used by other organizations to access your [place] medical records  [id number]         Blood Pressure from Last 3 Encounters:   05/04/18 126/76   04/04/18 126/80   04/07/17 118/62    Weight from Last 3 Encounters:   05/04/18 183 lb (83 kg)   04/04/18 183 lb 6.4 oz (83.2 kg)   04/07/17 181 lb 9.6 oz (82.4 kg)              We Performed the Following     Medroxyprogesterone inj/1mg (Depo  Provera J-Code)     THER/PROPH/DIAG INJ, SC/IM        Primary Care Provider Office Phone # Fax #    Kendra Yanez [phone] [address]  [facility] [address]        Equal Access to Services     [facility] AH: Hadii aad ku hadasho Soomaali, waaxda luqadaha, qaybta kaalmada adeegyada, waxay idiin hayaan adeeg khnoah lamiahn ah. So [facility] [phone].    ATENCIÓN: Si habla español, tiene a mishra disposición servicios gratuitos de asistencia lingüística. Llame al [phone].    We comply with applicable federal civil rights laws and [place] laws. We do not discriminate on the basis of race, color, national origin, age, disability, sex, sexual orientation, or gender identity.            Thank you!     Thank you for choosing [facility]  for your care. Our goal is always to provide you with excellent care. Hearing back from our patients is one way we can continue to improve our services. Please take a few minutes to complete the written survey that you may receive in the mail after your visit with us. Thank you!             Your Updated Medication List - Protect others around you: Learn how to safely use, store and throw away your medicines at www.disposemymeds.org.          This list is accurate as of 8/15/18  2:07 PM.  Always use your most recent med list.                   Brand Name Dispense Instructions for use Diagnosis    * loratadine-pseudoePHEDrine  MG per 24 hr tablet    CLARITIN-D 24-hour     TAKE 1 TABLET DAILY BY MOUTH - GENERIC FOR CLARITIN-D        * ALLERGY RELIEF/NASAL DECONGEST  MG per 24 hr tablet   Generic drug:  loratadine-pseudoePHEDrine     30 tablet    TAKE 1 TABLET BY MOUTH DAILY    Chronic seasonal allergic rhinitis, unspecified trigger       * ALLERGY RELIEF/NASAL DECONGEST  MG per 24 hr tablet   Generic drug:  loratadine-pseudoePHEDrine     30 tablet    TAKE 1 TABLET BY MOUTH DAILY    Chronic seasonal allergic rhinitis, unspecified  trigger       * ALLERGY RELIEF/NASAL DECONGEST  MG per 24 hr tablet   Generic drug:  loratadine-pseudoePHEDrine     30 tablet    TAKE 1 TABLET BY MOUTH DAILY    Chronic seasonal allergic rhinitis, unspecified trigger       * ALLERGY RELIEF/NASAL DECONGEST  MG per 24 hr tablet   Generic drug:  loratadine-pseudoePHEDrine     30 tablet    TAKE 1 TABLET BY MOUTH DAILY    Chronic seasonal allergic rhinitis, unspecified trigger       dicyclomine 20 MG tablet    BENTYL    120 tablet    TAKE 1 TABLET BY MOUTH 3 TIMES A DAY    Irritable bowel syndrome with both constipation and diarrhea       medroxyPROGESTERone 150 MG/ML injection    DEPO-PROVERA    0.9 mL    Inject 1 mL (150 mg) into the muscle every 3 months    Encounter for management and injection of injectable progestin contraceptive       pantoprazole 40 MG EC tablet    PROTONIX    30 tablet    TAKE 1 TABLET BY MOUTH DAILY    Gastroesophageal reflux disease, esophagitis presence not specified       prochlorperazine 5 MG tablet    COMPAZINE    90 tablet    TAKE 1 TO 2 TABLETS BY MOUTH EVERY 8 HOURS AS NEEDED FOR NAUSEA    Migraine without aura and without status migrainosus, not intractable       propranolol 80 MG 24 hr capsule    INDERAL LA    30 capsule    TAKE 1 CAPSULE BY MOUTH DAILY    Migraine without aura and without status migrainosus, not intractable       sertraline 100 MG tablet    ZOLOFT    30 tablet    TAKE 1 TABLET BY MOUTH DAILY    Episode of recurrent major depressive disorder, unspecified depression episode severity (H), Anxiety       SUMAtriptan 100 MG tablet    IMITREX    9 tablet    TAKE 1 TABLET BY MOUTH AT ONSET OF HEADACHE FOR MIGRAINE    Migraine without aura and without status migrainosus, not intractable       * Notice:  This list has 5 medication(s) that are the same as other medications prescribed for you. Read the directions carefully, and ask your doctor or other care provider to review them with you.

## 2018-08-31 NOTE — PROGRESS NOTES
SUBJECTIVE:   Aubree Corcoran is a 33 year old female who presents to clinic today for the following health issues:      Migraine Follow-Up    Headaches symptoms:  Worsened pt thinks it is due to stress    Frequency: 5x a week      Duration of headaches: all day    Able to do normal daily activities/work with migraines: Yes    Rescue/Relief medication:sumatriptan (Imitrex)              Effectiveness: total relief if patient is able to lay down     Preventative medication: propranolol    Neurologic complications: No new stroke-like symptoms, loss of vision or speech, numbness or weakness    In the past 4 weeks, how often have you gone to Urgent Care or the emergency room because of your headaches?  0    Amount of exercise or physical activity: None    Problems taking medications regularly: No    Medication side effects: none    Diet: regular (no restrictions)    Occasional nausea, takes compazine    Followed with Rina Haas.  No longer able to travel the distance.    Prior Elavil, Depakote, Topamax    No prior botox    Interested in new monoclonal antibody, monthly injections - Aimovig      Depression and Anxiety Follow-Up    Status since last visit: No change    Other associated symptoms:None    Complicating factors:     Significant life event: No     Current substance abuse: None    Zoloft increased to 100 mg at last visit months ago    Continued increase stress - mom no longer candidate for lung transplant; end stage COPD    No counseling - declines referral    Stress reduction - long baths, music    No regular exercise    Works in medical records - stable    Has FMLA for mother's help - considering adding for migraines - 1-2 x month    Having trouble falling and staying asleep;  Has tried Melatonin and Tylenol PM; onset x months; limited caffeine; unknown snorer; doesn't feel rested      PHQ-9 4/7/2017   Total Score 0   Q9: Suicide Ideation Not at all     JESSICA-7 SCORE 4/7/2017   Total Score 0        PHQ-9  English  PHQ-9   Any Language  JESSICA-7  Suicide Assessment Five-step Evaluation and Treatment (SAFE-T)    Also, of note, patient was referred to cardiology given early family history.  Was scheduled with Dr. Malave, but cancelled.  Had started new job that day.  Patient declines scheduling at this time.    Problem list and histories reviewed & adjusted, as indicated.  Additional history: as documented    Current Outpatient Prescriptions   Medication     ALLERGY RELIEF/NASAL DECONGEST  MG per 24 hr tablet     dicyclomine (BENTYL) 20 MG tablet     medroxyPROGESTERone (DEPO-PROVERA) 150 MG/ML injection     pantoprazole (PROTONIX) 40 MG EC tablet     prochlorperazine (COMPAZINE) 5 MG tablet     propranolol (INDERAL LA) 80 MG 24 hr capsule     sertraline (ZOLOFT) 100 MG tablet     SUMAtriptan (IMITREX) 100 MG tablet     loratadine-pseudoePHEDrine (CLARITIN-D 24-HOUR)  MG per 24 hr tablet     No current facility-administered medications for this visit.        Patient Active Problem List   Diagnosis     ACP (advance care planning)     Tobacco use disorder     Migraine without aura and without status migrainosus, not intractable     Gastroesophageal reflux disease, esophagitis presence not specified     Irritable bowel syndrome with both constipation and diarrhea     Family history of heart disease in female family member before age 65     Family history of heart disease in male family member before age 55     Past Surgical History:   Procedure Laterality Date     ORTHOPEDIC SURGERY  2002    ankle surgery- bone spur removal       Social History   Substance Use Topics     Smoking status: Current Every Day Smoker     Packs/day: 0.50     Years: 15.00     Types: Cigarettes     Smokeless tobacco: Never Used     Alcohol use No     Family History   Problem Relation Age of Onset     HEART DISEASE Mother      8 stents; onset 40s?     Chronic Obstructive Pulmonary Disease Mother      Cancer Father      mouth and  throat cancer     HEART DISEASE Brother      stents; age 37; occlusive disease     Bladder Cancer Brother            Reviewed and updated as needed this visit by clinical staff  Tobacco  Allergies  Meds  Med Hx  Surg Hx  Fam Hx  Soc Hx      Reviewed and updated as needed this visit by Provider  Allergies  Meds         ROS:  Constitutional, HEENT, cardiovascular, pulmonary, gi and gu systems are negative, except as otherwise noted.    OBJECTIVE:     /82 (Patient Position: Sitting)  Pulse 78  Temp 98.5  F (36.9  C) (Tympanic)  Wt 176 lb (79.8 kg)  SpO2 99%  BMI 30.69 kg/m2  Body mass index is 30.69 kg/(m^2).  GENERAL: alert, no distress and over weight  EYES: Eyes grossly normal to inspection, PERRL and conjunctivae and sclerae normal  NECK: no adenopathy, no asymmetry, masses, or scars and thyroid normal to palpation  RESP: lungs clear to auscultation - no rales, rhonchi or wheezes  CV: regular rate and rhythm, normal S1 S2, no S3 or S4, no murmur, click or rub, no peripheral edema and peripheral pulses strong  MS: no gross musculoskeletal defects noted, no edema  NEURO: Normal strength and tone, mentation intact and speech normal  PSYCH: mentation appears normal, affect normal/bright      ASSESSMENT/PLAN:     (G43.009) Migraine without aura and without status migrainosus, not intractable  (primary encounter diagnosis)  Comment: uncontrolled; multiple prior prophylactics; room to increase Propranolol; referral to neurology per patient request to consider new injectable Aimovig  Plan: propranolol (INDERAL LA) 120 MG 24 hr capsule,         NEUROLOGY ADULT REFERRAL         (F17.200) Tobacco use disorder  Comment: not ready to quit  (Z71.6) Encounter for tobacco use cessation counseling    (F32.9) Single current episode of major depressive disorder, unspecified depression episode severity  Comment: stable overall, but increased stressors  Plan: increase Zoloft, add Vistaril; patient declines  counseling    (F41.9) Anxiety  Plan: sertraline (ZOLOFT) 100 MG tablet, hydrOXYzine         (VISTARIL) 25 MG capsule    (G47.00) Insomnia, unspecified type  Plan: hydrOXYzine (VISTARIL) 25 MG capsule    (F33.9) Episode of recurrent major depressive disorder, unspecified depression episode severity (H)  Plan: sertraline (ZOLOFT) 100 MG tablet    Patient Instructions   Increase Propranolol to 120 mg daily for headache prevention.  Referral to Dr. Sloan.  Consideration of Aimovig.    Increase Zoloft to 150 mg daily.  Add Vistaril as needed for anxiety/insomnia.  Consider counseling.  Routine exercise advised.    Reschedule cardiology consultation at your convenience.  Smoking cessation advised.    Can drop off [facility] paperwork to be updated - for migraines and for time to care for mother.    Annual routine physical advised.  Otherwise, follow up office visit 1 month.            Kendra Bailey MD  [facility]

## 2018-09-04 DIAGNOSIS — F41.9 ANXIETY: ICD-10-CM

## 2018-09-04 DIAGNOSIS — F33.9 EPISODE OF RECURRENT MAJOR DEPRESSIVE DISORDER, UNSPECIFIED DEPRESSION EPISODE SEVERITY (H): ICD-10-CM

## 2018-09-04 RX ORDER — SERTRALINE HYDROCHLORIDE 100 MG/1
TABLET, FILM COATED ORAL
Qty: 30 TABLET | Refills: 0 | Status: CANCELLED | OUTPATIENT
Start: 2018-09-04

## 2018-09-05 NOTE — TELEPHONE ENCOUNTER
Zoloft  Last Written Prescription Date:  8/1/18  Last Fill Quantity: 30,   # refills: 0  Last Office Visit: 8/1/18  Future Office visit:

## 2018-09-06 ENCOUNTER — OFFICE VISIT (OUTPATIENT)
Dept: FAMILY MEDICINE | Facility: OTHER | Age: 33
End: 2018-09-06
Attending: FAMILY MEDICINE
Payer: COMMERCIAL

## 2018-09-06 VITALS
SYSTOLIC BLOOD PRESSURE: 122 MMHG | HEART RATE: 78 BPM | OXYGEN SATURATION: 99 % | TEMPERATURE: 98.5 F | DIASTOLIC BLOOD PRESSURE: 82 MMHG | WEIGHT: 176 LBS | BODY MASS INDEX: 30.69 KG/M2

## 2018-09-06 DIAGNOSIS — F33.9 EPISODE OF RECURRENT MAJOR DEPRESSIVE DISORDER, UNSPECIFIED DEPRESSION EPISODE SEVERITY (H): ICD-10-CM

## 2018-09-06 DIAGNOSIS — G43.009 MIGRAINE WITHOUT AURA AND WITHOUT STATUS MIGRAINOSUS, NOT INTRACTABLE: Primary | ICD-10-CM

## 2018-09-06 DIAGNOSIS — F17.200 TOBACCO USE DISORDER: ICD-10-CM

## 2018-09-06 DIAGNOSIS — F32.9 SINGLE CURRENT EPISODE OF MAJOR DEPRESSIVE DISORDER, UNSPECIFIED DEPRESSION EPISODE SEVERITY: ICD-10-CM

## 2018-09-06 DIAGNOSIS — F41.9 ANXIETY: ICD-10-CM

## 2018-09-06 DIAGNOSIS — Z71.6 ENCOUNTER FOR TOBACCO USE CESSATION COUNSELING: ICD-10-CM

## 2018-09-06 DIAGNOSIS — G47.00 INSOMNIA, UNSPECIFIED TYPE: ICD-10-CM

## 2018-09-06 PROBLEM — Z82.49 FAMILY HISTORY OF HEART DISEASE IN FEMALE FAMILY MEMBER BEFORE AGE 65: Status: RESOLVED | Noted: 2017-04-07 | Resolved: 2018-09-06

## 2018-09-06 PROCEDURE — 99214 OFFICE O/P EST MOD 30 MIN: CPT | Performed by: FAMILY MEDICINE

## 2018-09-06 RX ORDER — HYDROXYZINE PAMOATE 25 MG/1
25-50 CAPSULE ORAL 3 TIMES DAILY PRN
Qty: 60 CAPSULE | Refills: 1 | Status: SHIPPED | OUTPATIENT
Start: 2018-09-06 | End: 2019-01-02

## 2018-09-06 RX ORDER — PROPRANOLOL HYDROCHLORIDE 120 MG/1
120 CAPSULE, EXTENDED RELEASE ORAL DAILY
Qty: 30 CAPSULE | Refills: 1 | Status: SHIPPED | OUTPATIENT
Start: 2018-09-06 | End: 2018-10-29

## 2018-09-06 RX ORDER — SERTRALINE HYDROCHLORIDE 100 MG/1
150 TABLET, FILM COATED ORAL DAILY
Qty: 45 TABLET | Refills: 1 | Status: SHIPPED | OUTPATIENT
Start: 2018-09-06 | End: 2018-10-29

## 2018-09-06 ASSESSMENT — ANXIETY QUESTIONNAIRES
4. TROUBLE RELAXING: NOT AT ALL
3. WORRYING TOO MUCH ABOUT DIFFERENT THINGS: NOT AT ALL
2. NOT BEING ABLE TO STOP OR CONTROL WORRYING: NOT AT ALL
5. BEING SO RESTLESS THAT IT IS HARD TO SIT STILL: NOT AT ALL
6. BECOMING EASILY ANNOYED OR IRRITABLE: NOT AT ALL
7. FEELING AFRAID AS IF SOMETHING AWFUL MIGHT HAPPEN: NOT AT ALL
GAD7 TOTAL SCORE: 0
1. FEELING NERVOUS, ANXIOUS, OR ON EDGE: NOT AT ALL

## 2018-09-06 ASSESSMENT — PAIN SCALES - GENERAL: PAINLEVEL: NO PAIN (0)

## 2018-09-06 NOTE — LETTER
My Depression Action Plan  Name: Aubree Corcoran   Date of Birth 1985  Date: 8/31/2018    My doctor: Kendra Yanez   My clinic: [facility] [place]  [place] [address]  [phone]          GREEN    ZONE   Good Control    What it looks like:     Things are going generally well. You have normal up s and down s. You may even feel depressed from time to time, but bad moods usually last less than a day.   What you need to do:  1. Continue to care for yourself (see self care plan)  2. Check your depression survival kit and update it as needed  3. Follow your physician s recommendations including any medication.  4. Do not stop taking medication unless you consult with your physician first.           YELLOW         ZONE Getting Worse    What it looks like:     Depression is starting to interfere with your life.     It may be hard to get out of bed; you may be starting to isolate yourself from others.    Symptoms of depression are starting to last most all day and this has happened for several days.     You may have suicidal thoughts but they are not constant.   What you need to do:     1. Call your care team, your response to treatment will improve if you keep your care team informed of your progress. Yellow periods are signs an adjustment may need to be made.     2. Continue your self-care, even if you have to fake it!    3. Talk to someone in your support network    4. Open up your depression survival kit           RED    ZONE Medical Alert - Get Help    What it looks like:     Depression is seriously interfering with your life.     You may experience these or other symptoms: You can t get out of bed most days, can t work or engage in other necessary activities, you have trouble taking care of basic hygiene, or basic responsibilities, thoughts of suicide or death that will not go away, self-injurious behavior.     What you need to do:  1. Call your care team and request a  same-day appointment. If they are not available (weekends or after hours) call your local crisis line, emergency room or 911.            Depression Self Care Plan / Survival Kit    Self-Care for Depression  Here s the deal. Your body and mind are really not as separate as most people think.  What you do and think affects how you feel and how you feel influences what you do and think. This means if you do things that people who feel good do, it will help you feel better.  Sometimes this is all it takes.  There is also a place for medication and therapy depending on how severe your depression is, so be sure to consult with your medical provider and/ or Behavioral Health Consultant if your symptoms are worsening or not improving.     In order to better manage my stress, I will:    Exercise  Get some form of exercise, every day. This will help reduce pain and release endorphins, the  feel good  chemicals in your brain. This is almost as good as taking antidepressants!  This is not the same as joining a gym and then never going! (they count on that by the way ) It can be as simple as just going for a walk or doing some gardening, anything that will get you moving.      Hygiene   Maintain good hygiene (Get out of bed in the morning, Make your bed, Brush your teeth, Take a shower, and Get dressed like you were going to work, even if you are unemployed).  If your clothes don't fit try to get ones that do.    Diet  I will strive to eat foods that are good for me, drink plenty of water, and avoid excessive sugar, caffeine, alcohol, and other mood-altering substances.  Some foods that are helpful in depression are: complex carbohydrates, B vitamins, flaxseed, fish or fish oil, fresh fruits and vegetables.    Psychotherapy  I agree to participate in Individual Therapy (if recommended).    Medication  If prescribed medications, I agree to take them.  Missing doses can result in serious side effects.  I understand that drinking  alcohol, or other illicit drug use, may cause potential side effects.  I will not stop my medication abruptly without first discussing it with my provider.    Staying Connected With Others  I will stay in touch with my friends, family members, and my primary care provider/team.    Use your imagination  Be creative.  We all have a creative side; it doesn t matter if it s oil painting, sand castles, or mud pies! This will also kick up the endorphins.    Witness Beauty  (AKA stop and smell the roses) Take a look outside, even in mid-winter. Notice colors, textures. Watch the squirrels and birds.     Service to others  Be of service to others.  There is always someone else in need.  By helping others we can  get out of ourselves  and remember the really important things.  This also provides opportunities for practicing all the other parts of the program.    Humor  Laugh and be silly!  Adjust your TV habits for less news and crime-drama and more comedy.    Control your stress  Try breathing deep, massage therapy, biofeedback, and meditation. Find time to relax each day.     My support system    Clinic Contact:  Phone number:    Contact 1:  Phone number:    Contact 2:  Phone number:    Mandaeism/:  Phone number:    Therapist:  Phone number:    Local crisis center:    Phone number:    Other community support:  Phone number:

## 2018-09-06 NOTE — MR AVS SNAPSHOT
After Visit Summary   9/6/2018    Aubree Corcoran    MRN: [id number]           Patient Information     Date Of Birth          1985        Visit Information        Provider Department      9/6/2018 8:15 AM Kendra Yaenz MD [facility]        Today's Diagnoses     Migraine without aura and without status migrainosus, not intractable    -  1    Tobacco use disorder        Encounter for tobacco use cessation counseling        Single current episode of major depressive disorder, unspecified depression episode severity        Anxiety        Insomnia, unspecified type        Gastroesophageal reflux disease, esophagitis presence not specified        Episode of recurrent major depressive disorder, unspecified depression episode severity (H)          Care Instructions    Increase Propranolol to 120 mg daily for headache prevention.  Referral to Dr. Sloan.  Consideration of Aimovig.    Increase Zoloft to 150 mg daily.  Add Vistaril as needed for anxiety/insomnia.  Consider counseling.  Routine exercise advised.    Reschedule cardiology consultation at your convenience.  Smoking cessation advised.    Can drop off [place] paperwork to be updated - for migraines and for time to care for mother.    Annual routine physical advised.            Follow-ups after your visit        Additional Services     NEUROLOGY ADULT REFERRAL       Your provider has referred you for the following:   Dr Sloan outreach    Please be aware that coverage of these services is subject to the terms and limitations of your health insurance plan.  Call member services at your health plan with any benefit or coverage questions.      Please bring the following with you to your appointment:    (1) Any X-Rays, CTs or MRIs which have been performed.  Contact the facility where they were done to arrange for  prior to your scheduled appointment.    (2) List of current medications  (3) This referral request   (4) Any  "documents/labs given to you for this referral                  Who to contact     If you have questions or need follow up information about today's clinic visit or your schedule please contact [facility] [place] directly at [phone].  Normal or non-critical lab and imaging results will be communicated to you by MyChart, letter or phone within 4 business days after the clinic has received the results. If you do not hear from us within 7 days, please contact the clinic through MyChart or phone. If you have a critical or abnormal lab result, we will notify you by phone as soon as possible.  Submit refill requests through [employer] or call your pharmacy and they will forward the refill request to us. Please allow 3 business days for your refill to be completed.          Additional Information About Your Visit        [facility] Information     [employer] lets you send messages to your doctor, view your test results, renew your prescriptions, schedule appointments and more. To sign up, go to [email or web address] . Click on \"Log in\" on the left side of the screen, which will take you to the Welcome page. Then click on \"Sign up Now\" on the right side of the page.     You will be asked to enter the access code listed below, as well as some personal information. Please follow the directions to create your username and password.     Your access code is: [email or web address]  Expires: 12/3/2018 10:52 AM     Your access code will  in 90 days. If you need help or a new code, please call your [facility] clinic or [phone].        Care EveryWhere ID     This is your Care EveryWhere ID. This could be used by other organizations to access your [facility] medical records  [id number]        Your Vitals Were     Pulse Temperature Pulse Oximetry BMI (Body Mass Index)          78 98.5  F (36.9  C) (Tympanic) 99% 30.69 kg/m2         Blood Pressure from Last 3 Encounters:   18 122/82   18 126/76   18 126/80    Weight " from Last 3 Encounters:   09/06/18 176 lb (79.8 kg)   05/04/18 183 lb (83 kg)   04/04/18 183 lb 6.4 oz (83.2 kg)              We Performed the Following     NEUROLOGY ADULT REFERRAL          Today's Medication Changes          These changes are accurate as of 9/6/18  8:37 AM.  If you have any questions, ask your nurse or doctor.               Start taking these medicines.        Dose/Directions    hydrOXYzine 25 MG capsule   Commonly known as:  VISTARIL   Used for:  Anxiety, Insomnia, unspecified type   Started by:  Kendra Yanez MD        Dose:  25-50 mg   Take 1-2 capsules (25-50 mg) by mouth 3 times daily as needed for anxiety or other (insomnia)   Quantity:  60 capsule   Refills:  1         These medicines have changed or have updated prescriptions.        Dose/Directions    propranolol 120 MG 24 hr capsule   Commonly known as:  INDERAL LA   This may have changed:    - medication strength  - See the new instructions.   Used for:  Migraine without aura and without status migrainosus, not intractable   Changed by:  Kendra Yanez MD        Dose:  120 mg   Take 1 capsule (120 mg) by mouth daily   Quantity:  30 capsule   Refills:  1       sertraline 100 MG tablet   Commonly known as:  ZOLOFT   This may have changed:  See the new instructions.   Used for:  Episode of recurrent major depressive disorder, unspecified depression episode severity (H), Anxiety   Changed by:  Kendra Yanez MD        Dose:  150 mg   Take 1.5 tablets (150 mg) by mouth daily   Quantity:  45 tablet   Refills:  1            Where to get your medicines      These medications were sent to [facility]  [address]  [address] [place] [address]     Phone:  [phone]     hydrOXYzine 25 MG capsule    propranolol 120 MG 24 hr capsule    sertraline 100 MG tablet                Primary Care Provider Office Phone # Fax #    Kendra Yanez [phone] [address]  [place] [address]        Equal Access to Services     [facility]: Hadii zunilda adkins hadtrudy Sosueali, waaxda luqadaha, qaybta kaalmaeduardo noehumbertoeduardo, choco mendezliudmilaana laura evans. So [facility] [phone].    ATENCIÓN: Si habla español, tiene a mishra disposición servicios gratuitos de asistencia lingüística. Sulaiman al [phone].    We comply with applicable federal civil rights laws and [place] laws. We do not discriminate on the basis of race, color, national origin, age, disability, sex, sexual orientation, or gender identity.            Thank you!     Thank you for choosing [facility]  for your care. Our goal is always to provide you with excellent care. Hearing back from our patients is one way we can continue to improve our services. Please take a few minutes to complete the written survey that you may receive in the mail after your visit with us. Thank you!             Your Updated Medication List - Protect others around you: Learn how to safely use, store and throw away your medicines at www.disposemymeds.org.          This list is accurate as of 9/6/18  8:37 AM.  Always use your most recent med list.                   Brand Name Dispense Instructions for use Diagnosis    * loratadine-pseudoePHEDrine  MG per 24 hr tablet    CLARITIN-D 24-hour     TAKE 1 TABLET DAILY BY MOUTH - GENERIC FOR CLARITIN-D        * ALLERGY RELIEF/NASAL DECONGEST  MG per 24 hr tablet   Generic drug:  loratadine-pseudoePHEDrine     30 tablet    TAKE 1 TABLET BY MOUTH DAILY    Chronic seasonal allergic rhinitis, unspecified trigger       dicyclomine 20 MG tablet    BENTYL    120 tablet    TAKE 1 TABLET BY MOUTH 3 TIMES A DAY    Irritable bowel syndrome with both constipation and diarrhea       hydrOXYzine 25 MG capsule    VISTARIL    60 capsule    Take 1-2 capsules (25-50 mg) by mouth 3 times daily as needed for anxiety or other (insomnia)    Anxiety, Insomnia, unspecified type        medroxyPROGESTERone 150 MG/ML injection    DEPO-PROVERA    0.9 mL    Inject 1 mL (150 mg) into the muscle every 3 months    Encounter for management and injection of injectable progestin contraceptive       pantoprazole 40 MG EC tablet    PROTONIX    30 tablet    TAKE 1 TABLET BY MOUTH DAILY    Gastroesophageal reflux disease, esophagitis presence not specified       prochlorperazine 5 MG tablet    COMPAZINE    90 tablet    TAKE 1 TO 2 TABLETS BY MOUTH EVERY 8 HOURS AS NEEDED FOR NAUSEA    Migraine without aura and without status migrainosus, not intractable       propranolol 120 MG 24 hr capsule    INDERAL LA    30 capsule    Take 1 capsule (120 mg) by mouth daily    Migraine without aura and without status migrainosus, not intractable       sertraline 100 MG tablet    ZOLOFT    45 tablet    Take 1.5 tablets (150 mg) by mouth daily    Episode of recurrent major depressive disorder, unspecified depression episode severity (H), Anxiety       SUMAtriptan 100 MG tablet    IMITREX    9 tablet    TAKE 1 TABLET BY MOUTH AT ONSET OF HEADACHE FOR MIGRAINE    Migraine without aura and without status migrainosus, not intractable       * Notice:  This list has 2 medication(s) that are the same as other medications prescribed for you. Read the directions carefully, and ask your doctor or other care provider to review them with you.

## 2018-09-06 NOTE — NURSING NOTE
"Chief Complaint   Patient presents with     Headache     Depression     Anxiety       Initial /82 (Patient Position: Sitting)  Pulse 78  Temp 98.5  F (36.9  C) (Tympanic)  Wt 176 lb (79.8 kg)  SpO2 99%  BMI 30.69 kg/m2 Estimated body mass index is 30.69 kg/(m^2) as calculated from the following:    Height as of 5/4/18: 5' 3.5\" (1.613 m).    Weight as of this encounter: 176 lb (79.8 kg).  Medication Reconciliation: complete    Taylor Caicedo LPN  "

## 2018-09-06 NOTE — PATIENT INSTRUCTIONS
Increase Propranolol to 120 mg daily for headache prevention.  Referral to Dr. Sloan.  Consideration of Aimovig.    Increase Zoloft to 150 mg daily.  Add Vistaril as needed for anxiety/insomnia.  Consider counseling.  Routine exercise advised.    Reschedule cardiology consultation at your convenience.  Smoking cessation advised.    Can drop off [place] paperwork to be updated - for migraines and for time to care for mother.    Annual routine physical advised.  Otherwise, follow up office visit 1 month.

## 2018-09-07 ASSESSMENT — ANXIETY QUESTIONNAIRES: GAD7 TOTAL SCORE: 0

## 2018-09-07 ASSESSMENT — PATIENT HEALTH QUESTIONNAIRE - PHQ9: SUM OF ALL RESPONSES TO PHQ QUESTIONS 1-9: 0

## 2018-09-18 DIAGNOSIS — G43.009 MIGRAINE WITHOUT AURA AND WITHOUT STATUS MIGRAINOSUS, NOT INTRACTABLE: ICD-10-CM

## 2018-09-19 RX ORDER — SUMATRIPTAN 100 MG/1
TABLET, FILM COATED ORAL
Qty: 9 TABLET | Refills: 0 | Status: SHIPPED | OUTPATIENT
Start: 2018-09-19 | End: 2018-10-02

## 2018-09-19 NOTE — TELEPHONE ENCOUNTER
Imitrex  Last office visit: 09/06/18  Last refill: 08/21/18 # 9 with 1 refill    Refill sent to PCP due to refill frequency.  Please advise.    Thank you.

## 2018-09-19 NOTE — TELEPHONE ENCOUNTER
SUMATRIPTAN SUCC 100 MG TABLET      Last Written Prescription Date:  8-  Last Fill Quantity: 9,   # refills: 1  Last Office Visit: 9-6-2018  Future Office visit:

## 2018-10-02 DIAGNOSIS — G43.009 MIGRAINE WITHOUT AURA AND WITHOUT STATUS MIGRAINOSUS, NOT INTRACTABLE: ICD-10-CM

## 2018-10-03 ENCOUNTER — TELEPHONE (OUTPATIENT)
Dept: FAMILY MEDICINE | Facility: OTHER | Age: 33
End: 2018-10-03

## 2018-10-03 NOTE — TELEPHONE ENCOUNTER
SUMATRIPTAN SUCC 100 MG TABLET        Last Written Prescription Date:  9/19/18  Last Fill Quantity: 9,   # refills: 0  Last Office Visit: 9/6/18  Future Office visit:

## 2018-10-03 NOTE — TELEPHONE ENCOUNTER
Offered pt next available to be seen for migraines, which is 10/15, but pt refused. States the deadline for the [facility] paperwork is 10/09. Do you want to try fitting her in somewhere?

## 2018-10-04 RX ORDER — SUMATRIPTAN 100 MG/1
TABLET, FILM COATED ORAL
Qty: 9 TABLET | Refills: 0 | Status: SHIPPED | OUTPATIENT
Start: 2018-10-04 | End: 2018-10-17

## 2018-10-04 NOTE — TELEPHONE ENCOUNTER
I might not have been clear in my last message. Pt would still like to be seen, but it has to be before 10/09. Do you see anywhere that would be appropriate? Thanks!

## 2018-10-08 ENCOUNTER — OFFICE VISIT (OUTPATIENT)
Dept: FAMILY MEDICINE | Facility: OTHER | Age: 33
End: 2018-10-08
Attending: FAMILY MEDICINE
Payer: COMMERCIAL

## 2018-10-08 VITALS
WEIGHT: 174.4 LBS | TEMPERATURE: 98.1 F | HEART RATE: 86 BPM | OXYGEN SATURATION: 98 % | SYSTOLIC BLOOD PRESSURE: 112 MMHG | DIASTOLIC BLOOD PRESSURE: 72 MMHG | BODY MASS INDEX: 30.41 KG/M2

## 2018-10-08 DIAGNOSIS — G43.009 MIGRAINE WITHOUT AURA AND WITHOUT STATUS MIGRAINOSUS, NOT INTRACTABLE: Primary | ICD-10-CM

## 2018-10-08 PROCEDURE — 99213 OFFICE O/P EST LOW 20 MIN: CPT | Performed by: FAMILY MEDICINE

## 2018-10-08 ASSESSMENT — PAIN SCALES - GENERAL: PAINLEVEL: NO PAIN (0)

## 2018-10-08 NOTE — MR AVS SNAPSHOT
After Visit Summary   10/8/2018    Aubree Corcoran    MRN: [id number]           Patient Information     Date Of Birth          1985        Visit Information        Provider Department      10/8/2018 3:00 PM Kendra Yanez MD [facility]        Today's Diagnoses     Migraine without aura and without status migrainosus, not intractable    -  1      Care Instructions    FMLA paperwork completed - copy to patient, 1 for her employer, and 1 to be scanned into chart.          Follow-ups after your visit        Who to contact     If you have questions or need follow up information about today's clinic visit or your schedule please contact [facility] directly at [phone].  Normal or non-critical lab and imaging results will be communicated to you by MyChart, letter or phone within 4 business days after the clinic has received the results. If you do not hear from us within 7 days, please contact the clinic through MyChart or phone. If you have a critical or abnormal lab result, we will notify you by phone as soon as possible.  Submit refill requests through [employer] or call your pharmacy and they will forward the refill request to us. Please allow 3 business days for your refill to be completed.          Additional Information About Your Visit        Care EveryWhere ID     This is your Care EveryWhere ID. This could be used by other organizations to access your [facility] medical records  [id number]        Your Vitals Were     Pulse Temperature Pulse Oximetry BMI (Body Mass Index)          86 98.1  F (36.7  C) (Tympanic) 98% 30.41 kg/m2         Blood Pressure from Last 3 Encounters:   10/08/18 112/72   09/06/18 122/82   05/04/18 126/76    Weight from Last 3 Encounters:   10/08/18 174 lb 6.4 oz (79.1 kg)   09/06/18 176 lb (79.8 kg)   05/04/18 183 lb (83 kg)              Today, you had the following     No orders found for display         Today's  Medication Changes          These changes are accurate as of 10/8/18  3:24 PM.  If you have any questions, ask your nurse or doctor.               These medicines have changed or have updated prescriptions.        Dose/Directions    ALLERGY RELIEF/NASAL DECONGEST  MG per 24 hr tablet   This may have changed:  Another medication with the same name was removed. Continue taking this medication, and follow the directions you see here.   Used for:  Chronic seasonal allergic rhinitis, unspecified trigger   Generic drug:  loratadine-pseudoePHEDrine   Changed by:  Kendra Yanez MD        TAKE 1 TABLET BY MOUTH DAILY   Quantity:  30 tablet   Refills:  0                Primary Care Provider Office Phone # Fax #    Kendra Yanez [phone] [address]  [place] [address]        Equal Access to Services     [facility]: Hadii zunilda adkins hadasho Soomaali, waaxda luqadaha, qaybta kaalmada adeegyada, waxay scarletin hayelvia johnson . So [facility] [phone].    ATENCIÓN: Si habla español, tiene a mishra disposición servicios gratuitos de asistencia lingüística. Llame al [phone].    We comply with applicable federal civil rights laws and [place] laws. We do not discriminate on the basis of race, color, national origin, age, disability, sex, sexual orientation, or gender identity.            Thank you!     Thank you for choosing [facility]  for your care. Our goal is always to provide you with excellent care. Hearing back from our patients is one way we can continue to improve our services. Please take a few minutes to complete the written survey that you may receive in the mail after your visit with us. Thank you!             Your Updated Medication List - Protect others around you: Learn how to safely use, store and throw away your medicines at www.disposemymeds.org.          This list is accurate as of 10/8/18  3:24 PM.  Always use your most recent med  list.                   Brand Name Dispense Instructions for use Diagnosis    ALLERGY RELIEF/NASAL DECONGEST  MG per 24 hr tablet   Generic drug:  loratadine-pseudoePHEDrine     30 tablet    TAKE 1 TABLET BY MOUTH DAILY    Chronic seasonal allergic rhinitis, unspecified trigger       dicyclomine 20 MG tablet    BENTYL    120 tablet    TAKE 1 TABLET BY MOUTH 3 TIMES A DAY    Irritable bowel syndrome with both constipation and diarrhea       hydrOXYzine 25 MG capsule    VISTARIL    60 capsule    Take 1-2 capsules (25-50 mg) by mouth 3 times daily as needed for anxiety or other (insomnia)    Anxiety, Insomnia, unspecified type       medroxyPROGESTERone 150 MG/ML injection    DEPO-PROVERA    0.9 mL    Inject 1 mL (150 mg) into the muscle every 3 months    Encounter for management and injection of injectable progestin contraceptive       pantoprazole 40 MG EC tablet    PROTONIX    30 tablet    TAKE 1 TABLET BY MOUTH DAILY    Gastroesophageal reflux disease, esophagitis presence not specified       prochlorperazine 5 MG tablet    COMPAZINE    90 tablet    TAKE 1 TO 2 TABLETS BY MOUTH EVERY 8 HOURS AS NEEDED FOR NAUSEA    Migraine without aura and without status migrainosus, not intractable       propranolol 120 MG 24 hr capsule    INDERAL LA    30 capsule    Take 1 capsule (120 mg) by mouth daily    Migraine without aura and without status migrainosus, not intractable       sertraline 100 MG tablet    ZOLOFT    45 tablet    Take 1.5 tablets (150 mg) by mouth daily    Episode of recurrent major depressive disorder, unspecified depression episode severity (H), Anxiety       SUMAtriptan 100 MG tablet    IMITREX    9 tablet    TAKE 1 TABLET BY MOUTH AT ONSET OF HEADACHE FOR MIGRAINE    Migraine without aura and without status migrainosus, not intractable

## 2018-10-08 NOTE — NURSING NOTE
"Chief Complaint   Patient presents with     Forms     FMLA       Initial /72 (BP Location: Right arm, Patient Position: Sitting, Cuff Size: Adult Regular)  Pulse 86  Temp 98.1  F (36.7  C) (Tympanic)  Wt 174 lb 6.4 oz (79.1 kg)  SpO2 98%  BMI 30.41 kg/m2 Estimated body mass index is 30.41 kg/(m^2) as calculated from the following:    Height as of 5/4/18: 5' 3.5\" (1.613 m).    Weight as of this encounter: 174 lb 6.4 oz (79.1 kg).  Medication Reconciliation: complete    Marleni Velez LPN  "

## 2018-10-08 NOTE — PROGRESS NOTES
SUBJECTIVE:   Aubree Corcoran is a 33 year old female who presents to clinic today for the following health issues:      FORMS to be filled out.      Duration: FMLA Forms; Ongoing Migraines    Description (location/character/radiation): Migraines    Intensity:  moderate    Accompanying signs and symptoms: headaches, nausea, weakness, sensitive to light, sound and smells    History (similar episodes/previous evaluation): See medical record    Precipitating or alleviating factors: Triggers vary    Therapies tried and outcome: medication which is effective and helpful with onset; Inderal  mg daily and Imitrex as needed     Headaches are chronic, sometimes daily, but migraines are less frequent  Has seen neurology in the past - Rina Brink.    Prior Elavil, Topamax, Depakote, now on Propranolol and Imitrex.    Nausea and dizziness associated, but no numbness, tingling, weakness.      Problem list and histories reviewed & adjusted, as indicated.  Additional history: as documented    Current Outpatient Prescriptions   Medication     ALLERGY RELIEF/NASAL DECONGEST  MG per 24 hr tablet     dicyclomine (BENTYL) 20 MG tablet     hydrOXYzine (VISTARIL) 25 MG capsule     medroxyPROGESTERone (DEPO-PROVERA) 150 MG/ML injection     pantoprazole (PROTONIX) 40 MG EC tablet     prochlorperazine (COMPAZINE) 5 MG tablet     propranolol (INDERAL LA) 120 MG 24 hr capsule     sertraline (ZOLOFT) 100 MG tablet     SUMAtriptan (IMITREX) 100 MG tablet     No current facility-administered medications for this visit.        Patient Active Problem List   Diagnosis     ACP (advance care planning)     Tobacco use disorder     Migraine without aura and without status migrainosus, not intractable     Gastroesophageal reflux disease, esophagitis presence not specified     Irritable bowel syndrome with both constipation and diarrhea     Family history of heart disease in male family member before age 55     Single current  episode of major depressive disorder, unspecified depression episode severity     Anxiety     Insomnia, unspecified type     Past Surgical History:   Procedure Laterality Date     ORTHOPEDIC SURGERY  2002    ankle surgery- bone spur removal       Social History   Substance Use Topics     Smoking status: Current Every Day Smoker     Packs/day: 0.50     Years: 15.00     Types: Cigarettes     Smokeless tobacco: Never Used     Alcohol use No     Family History   Problem Relation Age of Onset     HEART DISEASE Mother      8 stents; onset 40s?     Chronic Obstructive Pulmonary Disease Mother      Cancer Father      mouth and throat cancer     HEART DISEASE Brother      stents; age 37; occlusive disease     Bladder Cancer Brother            Reviewed and updated as needed this visit by clinical staff  Tobacco  Allergies  Meds  Problems  Med Hx  Surg Hx  Fam Hx  Soc Hx        Reviewed and updated as needed this visit by Provider  Allergies  Meds  Problems         ROS:  Constitutional, HEENT, cardiovascular, pulmonary, gi and gu systems are negative, except as otherwise noted.    OBJECTIVE:     /72 (BP Location: Right arm, Patient Position: Sitting, Cuff Size: Adult Regular)  Pulse 86  Temp 98.1  F (36.7  C) (Tympanic)  Wt 174 lb 6.4 oz (79.1 kg)  SpO2 98%  BMI 30.41 kg/m2  Body mass index is 30.41 kg/(m^2).  GENERAL: healthy, alert and no distress  PSYCH: mentation appears normal, affect normal/bright    Diagnostic Test Results:  none     ASSESSMENT/PLAN:   (G43.009) Migraine without aura and without status migrainosus, not intractable  (primary encounter diagnosis)  Comment: neurologist, Dr. Sloan, does outreach here, but not for migraines.  Patient use to see Rina Haas.  Willing to see her or partner again, but prefers [place], [facility].  Does not travel distances and declines assistance in transportation.    Patient Instructions   [place] paperwork completed - copy to patient, 1 for  her employer, and 1 to be scanned into chart.          Kendra Bailey MD  [facility]

## 2018-10-29 DIAGNOSIS — J30.2 CHRONIC SEASONAL ALLERGIC RHINITIS: ICD-10-CM

## 2018-10-30 RX ORDER — LORATADINE AND PSEUDOEPHEDRINE SULFATE 10; 240 MG/1; MG/1
TABLET, FILM COATED, EXTENDED RELEASE ORAL
Qty: 30 TABLET | Refills: 1 | Status: SHIPPED | OUTPATIENT
Start: 2018-10-30 | End: 2019-01-02

## 2018-10-30 NOTE — TELEPHONE ENCOUNTER
ALLERGY RELIEF/NASAL DECONGEST  MG per 24 hr tablet      Last Written Prescription Date:  3/30/18  Last Fill Quantity: 30,   # refills: 0  Last Office Visit: 10/8/18  Future Office visit:       Routing refill request to provider for review/approval because:  Drug not on the [facility], [facility] or [facility] refill protocol or controlled substance

## 2018-11-07 ENCOUNTER — ALLIED HEALTH/NURSE VISIT (OUTPATIENT)
Dept: ALLERGY | Facility: OTHER | Age: 33
End: 2018-11-07
Attending: NURSE PRACTITIONER
Payer: COMMERCIAL

## 2018-11-07 DIAGNOSIS — Z30.42 ENCOUNTER FOR MANAGEMENT AND INJECTION OF INJECTABLE PROGESTIN CONTRACEPTIVE: ICD-10-CM

## 2018-11-07 PROCEDURE — 96372 THER/PROPH/DIAG INJ SC/IM: CPT

## 2018-11-07 NOTE — PROGRESS NOTES
The following medication was given:     MEDICATION: Depo Provera 150mg  ROUTE: IM  SITE: LUQ - Gluteus  DOSE: 1 mL  LOT #: [id number]  :  [employer]   EXPIRATION DATE:  4/30/2021  NDC#: [address]-3  Patient will return between 1/23/19-2/6/19 for another injection.    Carline Thomas

## 2018-11-07 NOTE — MR AVS SNAPSHOT
After Visit Summary   11/7/2018    Aubree Corcoran    MRN: [id number]           Patient Information     Date Of Birth          1985        Visit Information        Provider Department      11/7/2018 9:30 AM [facility] SHOT ROOM [place] [facility] [place]        Today's Diagnoses     Encounter for management and injection of injectable progestin contraceptive           Follow-ups after your visit        Who to contact     If you have questions or need follow up information about today's clinic visit or your schedule please contact [facility] directly at [phone].  Normal or non-critical lab and imaging results will be communicated to you by MyChart, letter or phone within 4 business days after the clinic has received the results. If you do not hear from us within 7 days, please contact the clinic through MyChart or phone. If you have a critical or abnormal lab result, we will notify you by phone as soon as possible.  Submit refill requests through [employer] or call your pharmacy and they will forward the refill request to us. Please allow 3 business days for your refill to be completed.          Additional Information About Your Visit        Care EveryWhere ID     This is your Care EveryWhere ID. This could be used by other organizations to access your [place] medical records  [id number]         Blood Pressure from Last 3 Encounters:   10/08/18 112/72   09/06/18 122/82   05/04/18 126/76    Weight from Last 3 Encounters:   10/08/18 174 lb 6.4 oz (79.1 kg)   09/06/18 176 lb (79.8 kg)   05/04/18 183 lb (83 kg)              We Performed the Following     C Medroxyprogesterone inj/1mg     INJECTION INTRAMUSCULAR OR SUB-Q        Primary Care Provider Office Phone # Fax #    Kendra Yanez [phone]       [address] [place] [address]        Equal Access to Services     PAWAN HEARD AH: wero Ryan, bhavya jimenez  choco schultebrigida vanessanoah colesaan ah. So [facility] [phone].    ATENCIÓN: Si beto gupta, tiene a mishra disposición servicios gratuitos de asistencia lingüística. Sulaiman al [phone].    We comply with applicable federal civil rights laws and [place] laws. We do not discriminate on the basis of race, color, national origin, age, disability, sex, sexual orientation, or gender identity.            Thank you!     Thank you for choosing [facility]  for your care. Our goal is always to provide you with excellent care. Hearing back from our patients is one way we can continue to improve our services. Please take a few minutes to complete the written survey that you may receive in the mail after your visit with us. Thank you!             Your Updated Medication List - Protect others around you: Learn how to safely use, store and throw away your medicines at www.disposemymeds.org.          This list is accurate as of 11/7/18  9:38 AM.  Always use your most recent med list.                   Brand Name Dispense Instructions for use Diagnosis    ALLERGY RELIEF/NASAL DECONGEST  MG per 24 hr tablet   Generic drug:  loratadine-pseudoePHEDrine     30 tablet    TAKE 1 TABLET BY MOUTH DAILY    Chronic seasonal allergic rhinitis       dicyclomine 20 MG tablet    BENTYL    120 tablet    TAKE 1 TABLET BY MOUTH 3 TIMES A DAY    Irritable bowel syndrome with both constipation and diarrhea       hydrOXYzine 25 MG capsule    VISTARIL    60 capsule    Take 1-2 capsules (25-50 mg) by mouth 3 times daily as needed for anxiety or other (insomnia)    Anxiety, Insomnia, unspecified type       medroxyPROGESTERone 150 MG/ML injection    DEPO-PROVERA    0.9 mL    Inject 1 mL (150 mg) into the muscle every 3 months    Encounter for management and injection of injectable progestin contraceptive       pantoprazole 40 MG EC tablet    PROTONIX    30 tablet    TAKE 1 TABLET BY MOUTH DAILY     Gastroesophageal reflux disease, esophagitis presence not specified       prochlorperazine 5 MG tablet    COMPAZINE    90 tablet    TAKE 1 TO 2 TABLETS BY MOUTH EVERY 8 HOURS AS NEEDED FOR NAUSEA    Migraine without aura and without status migrainosus, not intractable       propranolol 120 MG 24 hr capsule    INDERAL LA    30 capsule    TAKE 1 CAPSULE BY MOUTH DAILY    Migraine without aura and without status migrainosus, not intractable       sertraline 100 MG tablet    ZOLOFT    45 tablet    TAKE 1 & 1/2 TABLETS BY MOUTH EVERY DAY    Episode of recurrent major depressive disorder, unspecified depression episode severity (H), Anxiety       SUMAtriptan 100 MG tablet    IMITREX    9 tablet    TAKE 1 TABLET BY MOUTH AT ONSET OF HEADACHE FOR MIGRAINE    Migraine without aura and without status migrainosus, not intractable

## 2019-01-02 DIAGNOSIS — G47.00 INSOMNIA, UNSPECIFIED TYPE: ICD-10-CM

## 2019-01-02 DIAGNOSIS — K58.9 IRRITABLE BOWEL SYNDROME, UNSPECIFIED TYPE: Primary | ICD-10-CM

## 2019-01-02 DIAGNOSIS — F41.9 ANXIETY: ICD-10-CM

## 2019-01-02 DIAGNOSIS — J30.2 CHRONIC SEASONAL ALLERGIC RHINITIS: ICD-10-CM

## 2019-01-02 DIAGNOSIS — G43.009 MIGRAINE WITHOUT AURA AND WITHOUT STATUS MIGRAINOSUS, NOT INTRACTABLE: ICD-10-CM

## 2019-01-03 NOTE — TELEPHONE ENCOUNTER
Allergy relief nasal decongest      Last Written Prescription Date:  10/30/2018  Last Fill Quantity: 30,   # refills: 1  Last Office Visit: 10/8/2018  Future Office visit:

## 2019-01-03 NOTE — TELEPHONE ENCOUNTER
Bentyl       Last Written Prescription Date:  6/6/2018  Last Fill Quantity: 120,   # refills: 3      Vistaril       Last Written Prescription Date:  9/6/2018  Last Fill Quantity: 60,   # refills: 1      Imitrex       Last Written Prescription Date:  10/18/2018  Last Fill Quantity: 9,   # refills: 3  Last Office Visit: 10/8/2018  Future Office visit:

## 2019-01-04 RX ORDER — DICYCLOMINE HCL 20 MG
TABLET ORAL
Qty: 120 TABLET | Refills: 0 | Status: SHIPPED | OUTPATIENT
Start: 2019-01-04 | End: 2019-07-10

## 2019-01-04 RX ORDER — LORATADINE AND PSEUDOEPHEDRINE SULFATE 10; 240 MG/1; MG/1
TABLET, FILM COATED, EXTENDED RELEASE ORAL
Qty: 30 TABLET | Refills: 0 | Status: SHIPPED | OUTPATIENT
Start: 2019-01-04 | End: 2019-02-01

## 2019-01-04 RX ORDER — SUMATRIPTAN 100 MG/1
TABLET, FILM COATED ORAL
Qty: 9 TABLET | Refills: 3 | Status: SHIPPED | OUTPATIENT
Start: 2019-01-04 | End: 2019-04-02

## 2019-01-04 RX ORDER — HYDROXYZINE PAMOATE 25 MG/1
CAPSULE ORAL
Qty: 60 CAPSULE | Refills: 0 | Status: SHIPPED | OUTPATIENT
Start: 2019-01-04 | End: 2019-02-01

## 2019-02-01 DIAGNOSIS — G47.00 INSOMNIA, UNSPECIFIED TYPE: ICD-10-CM

## 2019-02-01 DIAGNOSIS — F41.9 ANXIETY: ICD-10-CM

## 2019-02-01 DIAGNOSIS — K58.9 IRRITABLE BOWEL SYNDROME: Primary | ICD-10-CM

## 2019-02-01 DIAGNOSIS — J30.2 CHRONIC SEASONAL ALLERGIC RHINITIS: ICD-10-CM

## 2019-02-01 RX ORDER — DICYCLOMINE HCL 20 MG
TABLET ORAL
Qty: 120 TABLET | Refills: 1 | Status: SHIPPED | OUTPATIENT
Start: 2019-02-01 | End: 2019-07-10

## 2019-02-01 RX ORDER — LORATADINE AND PSEUDOEPHEDRINE SULFATE 10; 240 MG/1; MG/1
TABLET, FILM COATED, EXTENDED RELEASE ORAL
Qty: 30 TABLET | Refills: 0 | Status: SHIPPED | OUTPATIENT
Start: 2019-02-01 | End: 2019-03-06

## 2019-02-01 RX ORDER — HYDROXYZINE PAMOATE 25 MG/1
CAPSULE ORAL
Qty: 60 CAPSULE | Refills: 1 | Status: SHIPPED | OUTPATIENT
Start: 2019-02-01 | End: 2019-04-04

## 2019-02-01 NOTE — TELEPHONE ENCOUNTER
ALLERGY RELIEF/NASAL DECONGEST  MG 24 hr tablet  Last Written Prescription Date:  1/4/19  Last Fill Quantity: 30,   # refills: 0  Last Office Visit: 10/8/18  Future Office visit:

## 2019-02-01 NOTE — TELEPHONE ENCOUNTER
dicyclomine (BENTYL) 20 MG tablet  Last Written Prescription Date:  1/4/19  Last Fill Quantity: 120,   # refills: 0  Last Office Visit: 10/8/18  Future Office visit:       hydroxyzine       Last Written Prescription Date:  1/4/19  Last Fill Quantity: 60,   # refills: 0  Last Office Visit: 10/8/18  Future Office visit:

## 2019-02-04 ENCOUNTER — ALLIED HEALTH/NURSE VISIT (OUTPATIENT)
Dept: ALLERGY | Facility: OTHER | Age: 34
End: 2019-02-04
Attending: FAMILY MEDICINE
Payer: COMMERCIAL

## 2019-02-04 DIAGNOSIS — Z30.42 ENCOUNTER FOR SURVEILLANCE OF INJECTABLE CONTRACEPTIVE: Primary | ICD-10-CM

## 2019-02-04 PROCEDURE — 96372 THER/PROPH/DIAG INJ SC/IM: CPT

## 2019-02-04 RX ORDER — MEDROXYPROGESTERONE ACETATE 150 MG/ML
150 INJECTION, SUSPENSION INTRAMUSCULAR ONCE
Status: COMPLETED | OUTPATIENT
Start: 2019-02-04 | End: 2019-02-04

## 2019-02-04 RX ADMIN — MEDROXYPROGESTERONE ACETATE 150 MG: 150 INJECTION, SUSPENSION INTRAMUSCULAR at 13:55

## 2019-02-04 NOTE — PROGRESS NOTES
Prior to injection, verified patient identity using patient's name and date of birth.  Due to injection administration, patient instructed to remain in clinic for 15 minutes  afterwards, and to report any adverse reaction to me immediately.    BP: Data Unavailable    LAST PAP/EXAM: No results found for: PAP  URINE HCG:not indicated    NEXT INJECTION DUE: 4/22/19 - 5/6/19         Drug Amount Wasted:  None.  Vial/Syringe: Single dose vial  Expiration Date:  4/30/2021    Carline Thomas

## 2019-03-06 DIAGNOSIS — J30.2 CHRONIC SEASONAL ALLERGIC RHINITIS: ICD-10-CM

## 2019-03-07 RX ORDER — LORATADINE AND PSEUDOEPHEDRINE SULFATE 10; 240 MG/1; MG/1
TABLET, FILM COATED, EXTENDED RELEASE ORAL
Qty: 30 TABLET | Refills: 0 | Status: SHIPPED | OUTPATIENT
Start: 2019-03-07 | End: 2019-07-10

## 2019-03-07 NOTE — TELEPHONE ENCOUNTER
Allergy relief nasal DE 24 hour      Last Written Prescription Date:  2-1-19  Last Fill Quantity: 30,   # refills: 0  Last Office Visit: 10-8-18

## 2019-04-10 DIAGNOSIS — F41.9 ANXIETY: Primary | ICD-10-CM

## 2019-04-10 DIAGNOSIS — G47.00 INSOMNIA: ICD-10-CM

## 2019-04-11 RX ORDER — HYDROXYZINE HYDROCHLORIDE 25 MG/1
TABLET, FILM COATED ORAL
Qty: 60 TABLET | Refills: 0 | Status: SHIPPED | OUTPATIENT
Start: 2019-04-11 | End: 2019-05-21

## 2019-05-01 ENCOUNTER — ALLIED HEALTH/NURSE VISIT (OUTPATIENT)
Dept: ALLERGY | Facility: OTHER | Age: 34
End: 2019-05-01
Attending: FAMILY MEDICINE
Payer: COMMERCIAL

## 2019-05-01 DIAGNOSIS — Z30.42 ENCOUNTER FOR SURVEILLANCE OF INJECTABLE CONTRACEPTIVE: Primary | ICD-10-CM

## 2019-05-01 PROCEDURE — 96372 THER/PROPH/DIAG INJ SC/IM: CPT

## 2019-05-01 RX ORDER — MEDROXYPROGESTERONE ACETATE 150 MG/ML
150 INJECTION, SUSPENSION INTRAMUSCULAR
Status: COMPLETED | OUTPATIENT
Start: 2019-05-01 | End: 2019-10-21

## 2019-05-01 RX ADMIN — MEDROXYPROGESTERONE ACETATE 150 MG: 150 INJECTION, SUSPENSION INTRAMUSCULAR at 11:43

## 2019-05-01 NOTE — PROGRESS NOTES
Patient is here today for a depo injection and we do not have any orders. Please sign or decline depo orders, thank you.

## 2019-05-01 NOTE — PROGRESS NOTES
Prior to injection, verified patient identity using patient's name and date of birth.      BP: Data Unavailable    LAST PAP/EXAM: No results found for: PAP  URINE HCG:not indicated    NEXT INJECTION DUE: 7/17/19 - 7/31/19         Drug Amount Wasted:  None.  Vial/Syringe: Single dose vial  Expiration Date:  7/31/2021    Given in Left Glut    Carline Thomas

## 2019-05-20 DIAGNOSIS — F41.9 ANXIETY: ICD-10-CM

## 2019-05-20 DIAGNOSIS — G47.00 INSOMNIA: ICD-10-CM

## 2019-05-21 RX ORDER — HYDROXYZINE HYDROCHLORIDE 25 MG/1
TABLET, FILM COATED ORAL
Qty: 60 TABLET | Refills: 0 | OUTPATIENT
Start: 2019-05-21

## 2019-05-21 RX ORDER — HYDROXYZINE HYDROCHLORIDE 25 MG/1
TABLET, FILM COATED ORAL
Qty: 60 TABLET | Refills: 0 | Status: SHIPPED | OUTPATIENT
Start: 2019-05-21 | End: 2019-07-01

## 2019-06-17 PROBLEM — F32.9 CURRENT EPISODE OF MAJOR DEPRESSIVE DISORDER WITHOUT PRIOR EPISODE: Status: ACTIVE | Noted: 2018-09-06

## 2019-07-01 DIAGNOSIS — G47.00 INSOMNIA: ICD-10-CM

## 2019-07-01 DIAGNOSIS — F41.9 ANXIETY: ICD-10-CM

## 2019-07-02 NOTE — TELEPHONE ENCOUNTER
hydrOXYzine (ATARAX) 25 MG tablet  Last Written Prescription Date:  5/21/19  Last Fill Quantity: 60,   # refills: 0  Last Office Visit: 10/8/18  Future Office visit:    Next 5 appointments (look out 90 days)    Jul 10, 2019  8:45 AM CDT  (Arrive by 8:30 AM)  SHORT with Kendra Yanez MD  [facility] [place] ([facility] [place] ) [address]  [place] [address]  [phone]

## 2019-07-03 RX ORDER — HYDROXYZINE HYDROCHLORIDE 25 MG/1
TABLET, FILM COATED ORAL
Qty: 60 TABLET | Refills: 0 | Status: SHIPPED | OUTPATIENT
Start: 2019-07-03 | End: 2019-07-10

## 2019-07-08 NOTE — PROGRESS NOTES
Subjective     Aubree Corcoran is a 33 year old female who presents to clinic today for the following health issues:    HPI   Depression and Anxiety Follow-Up    How are you doing with your depression since your last visit? No change    How are you doing with your anxiety since your last visit?  No change    Are you having other symptoms that might be associated with depression or anxiety? No    Have you had a significant life event? No     Do you have any concerns with your use of alcohol or other drugs? No     Zoloft increased to 150 mg in 10/2018    Did notice some increase, but then plateau     Symptoms - tired, low motivation, don't want to do anything, wants to stay in bed    Laid off in November, and hasn't found another job yet; actively looking - hospital/clinic -     No significant anxiety    Mom's health is always a stressor    No substance use    Ongoing smoking - declines quit assistance    No other antidepressants since her teenage years - Paxil, Elavil, possibly others; does not recall Wellbutrin    Declines counseling    Would like refill of Claritin D for nasal congestions.  Does not respond to nasal steroid or plain antihistamine.    Social History     Tobacco Use     Smoking status: Current Every Day Smoker     Packs/day: 0.50     Years: 15.00     Pack years: 7.50     Types: Cigarettes     Smokeless tobacco: Never Used   Substance Use Topics     Alcohol use: No     Drug use: No     PHQ 4/7/2017 9/6/2018   PHQ-9 Total Score 0 0   Q9: Thoughts of better off dead/self-harm past 2 weeks Not at all Not at all     JESSICA-7 SCORE 4/7/2017 9/6/2018   Total Score 0 0           Suicide Assessment Five-step Evaluation and Treatment (SAFE-T)    Amount of exercise or physical activity: 1 day/week for an average of 15-30 minutes    Problems taking medications regularly: No    Medication side effects: none    Diet: regular (no restrictions)          Current Outpatient Medications    Medication     dicyclomine (BENTYL) 20 MG tablet     hydrOXYzine (VISTARIL) 25 MG capsule     loratadine-pseudoePHEDrine (CLARITIN-D 24-HOUR)  MG 24 hr tablet     medroxyPROGESTERone (DEPO-PROVERA) 150 MG/ML injection     pantoprazole (PROTONIX) 40 MG EC tablet     prochlorperazine (COMPAZINE) 5 MG tablet     propranolol ER (INDERAL LA) 120 MG 24 hr capsule     sertraline (ZOLOFT) 100 MG tablet     SUMAtriptan (IMITREX) 100 MG tablet     Current Facility-Administered Medications   Medication     medroxyPROGESTERone (DEPO-PROVERA) injection 150 mg       Patient Active Problem List   Diagnosis     ACP (advance care planning)     Tobacco use disorder     Migraine without aura and without status migrainosus, not intractable     Gastroesophageal reflux disease, esophagitis presence not specified     Irritable bowel syndrome with both constipation and diarrhea     Family history of heart disease in male family member before age 55     Single current episode of major depressive disorder, unspecified depression episode severity     Anxiety     Insomnia, unspecified type     Past Surgical History:   Procedure Laterality Date     ORTHOPEDIC SURGERY  2002    ankle surgery- bone spur removal       Social History     Tobacco Use     Smoking status: Current Every Day Smoker     Packs/day: 0.50     Years: 15.00     Pack years: 7.50     Types: Cigarettes     Smokeless tobacco: Never Used   Substance Use Topics     Alcohol use: No     Family History   Problem Relation Age of Onset     Heart Disease Mother         8 stents; onset 40s?     Chronic Obstructive Pulmonary Disease Mother      Cancer Father         mouth and throat cancer     Heart Disease Brother         stents; age 37; occlusive disease     Bladder Cancer Brother              Reviewed and updated as needed this visit by Provider  Allergies  Meds         Review of Systems   ROS COMP: Constitutional, HEENT, cardiovascular, pulmonary, gi and gu systems are  negative, except as otherwise noted.      Objective    /74 (BP Location: Left arm, Patient Position: Chair, Cuff Size: Adult Large)   Pulse 77   Temp 98.3  F (36.8  C) (Tympanic)   Wt 84.4 kg (186 lb)   SpO2 96%   BMI 32.43 kg/m    Body mass index is 32.43 kg/m .  Physical Exam   GENERAL: healthy, alert and no distress  EYES: Eyes grossly normal to inspection, PERRL and conjunctivae and sclerae normal  HENT: normal cephalic/atraumatic, ear canals and TM's normal, nasal mucosa edematous , oropharynx clear and oral mucous membranes moist  NECK: no adenopathy, no asymmetry, masses, or scars and thyroid normal to palpation  RESP: lungs clear to auscultation - no rales, rhonchi or wheezes  CV: regular rate and rhythm, normal S1 S2, no S3 or S4, no murmur, click or rub, no peripheral edema and peripheral pulses strong  PSYCH: mentation appears normal, affect normal/bright    Diagnostic Test Results:  none         Assessment & Plan       ICD-10-CM    1. Episode of recurrent major depressive disorder, unspecified depression episode severity (H) F33.9 sertraline (ZOLOFT) 100 MG tablet   2. Anxiety F41.9 hydrOXYzine (VISTARIL) 25 MG capsule     sertraline (ZOLOFT) 100 MG tablet   3. Insomnia, unspecified type G47.00 hydrOXYzine (VISTARIL) 25 MG capsule   4. Nasal congestion R09.81 loratadine-pseudoePHEDrine (CLARITIN-D 24-HOUR)  MG 24 hr tablet   5. Tobacco use disorder F17.200    6. Encounter for tobacco use cessation counseling Z71.6       Discussed increase Zoloft vs adding Wellbutrin vs changing serotonin specific reuptake inhibitor.    Tobacco Cessation:   reports that she has been smoking cigarettes.  She has a 7.50 pack-year smoking history. She has never used smokeless tobacco.  Tobacco Cessation Action Plan: Information offered: Patient not interested at this time        Patient Instructions   Increase Zoloft to 200 mg - max dose.  If not responding or side effects - consider:  1. Going back down to  150 mg and adding Wellbutrin.  2. Switching to different serotonin specific reuptake inhibitor such as Prozac.  Follow up 1 month, sooner if concerns.  Schedule physical.    Consider ENT consult for nasal congestion  - maybe procedural?          Return in about 1 month (around 8/10/2019) for depression/anxiety, Physical Exam.    Kendra Bailey MD  [facility]

## 2019-07-10 ENCOUNTER — OFFICE VISIT (OUTPATIENT)
Dept: FAMILY MEDICINE | Facility: OTHER | Age: 34
End: 2019-07-10
Attending: FAMILY MEDICINE
Payer: COMMERCIAL

## 2019-07-10 VITALS
SYSTOLIC BLOOD PRESSURE: 102 MMHG | OXYGEN SATURATION: 96 % | DIASTOLIC BLOOD PRESSURE: 74 MMHG | WEIGHT: 186 LBS | BODY MASS INDEX: 32.43 KG/M2 | TEMPERATURE: 98.3 F | HEART RATE: 77 BPM

## 2019-07-10 DIAGNOSIS — F17.200 TOBACCO USE DISORDER: ICD-10-CM

## 2019-07-10 DIAGNOSIS — Z71.6 ENCOUNTER FOR TOBACCO USE CESSATION COUNSELING: ICD-10-CM

## 2019-07-10 DIAGNOSIS — F41.9 ANXIETY: ICD-10-CM

## 2019-07-10 DIAGNOSIS — F33.9 EPISODE OF RECURRENT MAJOR DEPRESSIVE DISORDER, UNSPECIFIED DEPRESSION EPISODE SEVERITY (H): Primary | ICD-10-CM

## 2019-07-10 DIAGNOSIS — R09.81 NASAL CONGESTION: ICD-10-CM

## 2019-07-10 DIAGNOSIS — G47.00 INSOMNIA, UNSPECIFIED TYPE: ICD-10-CM

## 2019-07-10 PROCEDURE — 99214 OFFICE O/P EST MOD 30 MIN: CPT | Performed by: FAMILY MEDICINE

## 2019-07-10 PROCEDURE — G0463 HOSPITAL OUTPT CLINIC VISIT: HCPCS

## 2019-07-10 RX ORDER — HYDROXYZINE PAMOATE 25 MG/1
CAPSULE ORAL
Qty: 60 CAPSULE | Refills: 1 | Status: SHIPPED | OUTPATIENT
Start: 2019-07-10 | End: 2019-09-16

## 2019-07-10 RX ORDER — SERTRALINE HYDROCHLORIDE 100 MG/1
200 TABLET, FILM COATED ORAL DAILY
Qty: 60 TABLET | Refills: 2 | Status: SHIPPED | OUTPATIENT
Start: 2019-07-10 | End: 2019-10-16

## 2019-07-10 ASSESSMENT — PAIN SCALES - GENERAL: PAINLEVEL: NO PAIN (0)

## 2019-07-10 NOTE — NURSING NOTE
"Chief Complaint   Patient presents with     Depression     Anxiety       Initial /74 (BP Location: Left arm, Patient Position: Chair, Cuff Size: Adult Large)   Pulse 77   Temp 98.3  F (36.8  C) (Tympanic)   Wt 84.4 kg (186 lb)   SpO2 96%   BMI 32.43 kg/m   Estimated body mass index is 32.43 kg/m  as calculated from the following:    Height as of 5/4/18: 1.613 m (5' 3.5\").    Weight as of this encounter: 84.4 kg (186 lb).  Medication Reconciliation: complete     Reina Mitchell LPN    "

## 2019-07-10 NOTE — PATIENT INSTRUCTIONS
Increase Zoloft to 200 mg - max dose.  If not responding or side effects - consider:  1. Going back down to 150 mg and adding Wellbutrin.  2. Switching to different serotonin specific reuptake inhibitor such as Prozac.  Follow up 1 month, sooner if concerns.  Schedule physical.    Consider ENT consult for nasal congestion  - maybe procedural?

## 2019-07-10 NOTE — LETTER
My Depression Action Plan  Name: Aubree Corcoran   Date of Birth 1985  Date: 7/8/2019    My doctor: Kendra Yanez   My clinic: [facility] [place]  [address]  [place] [address]  [phone]          GREEN    ZONE   Good Control    What it looks like:     Things are going generally well. You have normal up s and down s. You may even feel depressed from time to time, but bad moods usually last less than a day.   What you need to do:  1. Continue to care for yourself (see self care plan)  2. Check your depression survival kit and update it as needed  3. Follow your physician s recommendations including any medication.  4. Do not stop taking medication unless you consult with your physician first.           YELLOW         ZONE Getting Worse    What it looks like:     Depression is starting to interfere with your life.     It may be hard to get out of bed; you may be starting to isolate yourself from others.    Symptoms of depression are starting to last most all day and this has happened for several days.     You may have suicidal thoughts but they are not constant.   What you need to do:     1. Call your care team, your response to treatment will improve if you keep your care team informed of your progress. Yellow periods are signs an adjustment may need to be made.     2. Continue your self-care, even if you have to fake it!    3. Talk to someone in your support network    4. Open up your depression survival kit           RED    ZONE Medical Alert - Get Help    What it looks like:     Depression is seriously interfering with your life.     You may experience these or other symptoms: You can t get out of bed most days, can t work or engage in other necessary activities, you have trouble taking care of basic hygiene, or basic responsibilities, thoughts of suicide or death that will not go away, self-injurious behavior.     What you need to do:  1. Call your care team and  request a same-day appointment. If they are not available (weekends or after hours) call your local crisis line, emergency room or 911.            Depression Self Care Plan / Survival Kit    Self-Care for Depression  Here s the deal. Your body and mind are really not as separate as most people think.  What you do and think affects how you feel and how you feel influences what you do and think. This means if you do things that people who feel good do, it will help you feel better.  Sometimes this is all it takes.  There is also a place for medication and therapy depending on how severe your depression is, so be sure to consult with your medical provider and/ or Behavioral Health Consultant if your symptoms are worsening or not improving.     In order to better manage my stress, I will:    Exercise  Get some form of exercise, every day. This will help reduce pain and release endorphins, the  feel good  chemicals in your brain. This is almost as good as taking antidepressants!  This is not the same as joining a gym and then never going! (they count on that by the way ) It can be as simple as just going for a walk or doing some gardening, anything that will get you moving.      Hygiene   Maintain good hygiene (Get out of bed in the morning, Make your bed, Brush your teeth, Take a shower, and Get dressed like you were going to work, even if you are unemployed).  If your clothes don't fit try to get ones that do.    Diet  I will strive to eat foods that are good for me, drink plenty of water, and avoid excessive sugar, caffeine, alcohol, and other mood-altering substances.  Some foods that are helpful in depression are: complex carbohydrates, B vitamins, flaxseed, fish or fish oil, fresh fruits and vegetables.    Psychotherapy  I agree to participate in Individual Therapy (if recommended).    Medication  If prescribed medications, I agree to take them.  Missing doses can result in serious side effects.  I understand that  drinking alcohol, or other illicit drug use, may cause potential side effects.  I will not stop my medication abruptly without first discussing it with my provider.    Staying Connected With Others  I will stay in touch with my friends, family members, and my primary care provider/team.    Use your imagination  Be creative.  We all have a creative side; it doesn t matter if it s oil painting, sand castles, or mud pies! This will also kick up the endorphins.    Witness Beauty  (AKA stop and smell the roses) Take a look outside, even in mid-winter. Notice colors, textures. Watch the squirrels and birds.     Service to others  Be of service to others.  There is always someone else in need.  By helping others we can  get out of ourselves  and remember the really important things.  This also provides opportunities for practicing all the other parts of the program.    Humor  Laugh and be silly!  Adjust your TV habits for less news and crime-drama and more comedy.    Control your stress  Try breathing deep, massage therapy, biofeedback, and meditation. Find time to relax each day.     My support system    Clinic Contact:  Phone number:    Contact 1:  Phone number:    Contact 2:  Phone number:    Jainism/:  Phone number:    Therapist:  Phone number:    Local crisis center:    Phone number:    Other community support:  Phone number:

## 2019-07-15 DIAGNOSIS — G43.009 MIGRAINE WITHOUT AURA AND WITHOUT STATUS MIGRAINOSUS, NOT INTRACTABLE: ICD-10-CM

## 2019-07-15 DIAGNOSIS — K58.2 IRRITABLE BOWEL SYNDROME WITH BOTH CONSTIPATION AND DIARRHEA: ICD-10-CM

## 2019-07-15 RX ORDER — PROCHLORPERAZINE MALEATE 5 MG
TABLET ORAL
Qty: 90 TABLET | Refills: 0 | Status: SHIPPED | OUTPATIENT
Start: 2019-07-15 | End: 2020-08-19

## 2019-07-15 RX ORDER — DICYCLOMINE HCL 20 MG
TABLET ORAL
Qty: 120 TABLET | Refills: 0 | Status: SHIPPED | OUTPATIENT
Start: 2019-07-15 | End: 2019-09-16

## 2019-07-15 RX ORDER — SUMATRIPTAN 100 MG/1
TABLET, FILM COATED ORAL
Qty: 9 TABLET | Refills: 0 | Status: SHIPPED | OUTPATIENT
Start: 2019-07-15 | End: 2019-08-19

## 2019-07-26 ENCOUNTER — ALLIED HEALTH/NURSE VISIT (OUTPATIENT)
Dept: ALLERGY | Facility: OTHER | Age: 34
End: 2019-07-26
Attending: FAMILY MEDICINE
Payer: COMMERCIAL

## 2019-07-26 DIAGNOSIS — Z30.42 ENCOUNTER FOR SURVEILLANCE OF INJECTABLE CONTRACEPTIVE: Primary | ICD-10-CM

## 2019-07-26 PROCEDURE — 96372 THER/PROPH/DIAG INJ SC/IM: CPT

## 2019-07-26 RX ADMIN — MEDROXYPROGESTERONE ACETATE 150 MG: 150 INJECTION, SUSPENSION INTRAMUSCULAR at 11:28

## 2019-07-26 NOTE — PROGRESS NOTES
Clinic Administered Medication Documentation      Depo Provera Documentation    Prior to injection, verified patient identity using patient's name and date of birth. Medication was administered. Please see MAR and medication order for additional information. Patient instructed to report any adverse reaction to staff immediately .    BP: Data Unavailable    LAST PAP/EXAM: No results found for: PAP  URINE HCG:not indicated    NEXT INJECTION DUE: 10/11/19 - 10/25/19    Was entire vial of medication used? Yes  Vial/Syringe: Single dose vial  Expiration Date:  07/2021    Aletha Padilla

## 2019-08-19 DIAGNOSIS — G43.009 MIGRAINE WITHOUT AURA AND WITHOUT STATUS MIGRAINOSUS, NOT INTRACTABLE: ICD-10-CM

## 2019-08-21 RX ORDER — SUMATRIPTAN 100 MG/1
TABLET, FILM COATED ORAL
Qty: 9 TABLET | Refills: 0 | Status: SHIPPED | OUTPATIENT
Start: 2019-08-21 | End: 2019-09-16

## 2019-08-29 NOTE — PATIENT INSTRUCTIONS
Will call with pap results.  Future lab orders in place - fasting.  Cardiology consult for family history early heart disease.  Add Wellbutrin for mood and smoking cessation.  Follow up 1 month to assess response, sooner if concerns.  Tetanus updated.    Flu shot advised when available.  Nexium in place of protonix if covered.    Preventive Health Recommendations  Female Ages 26 - 39  Yearly exam:   See your health care provider every year in order to    Review health changes.     Discuss preventive care.      Review your medicines if you your doctor has prescribed any.    Until age 30: Get a Pap test every three years (more often if you have had an abnormal result).    After age 30: Talk to your doctor about whether you should have a Pap test every 3 years or have a Pap test with HPV screening every 5 years.   You do not need a Pap test if your uterus was removed (hysterectomy) and you have not had cancer.  You should be tested each year for STDs (sexually transmitted diseases), if you're at risk.   Talk to your provider about how often to have your cholesterol checked.  If you are at risk for diabetes, you should have a diabetes test (fasting glucose).  Shots: Get a flu shot each year. Get a tetanus shot every 10 years.   Nutrition:     Eat at least 5 servings of fruits and vegetables each day.    Eat whole-grain bread, whole-wheat pasta and brown rice instead of white grains and rice.    Get adequate Calcium and Vitamin D.     Lifestyle    Exercise at least 150 minutes a week (30 minutes a day, 5 days of the week). This will help you control your weight and prevent disease.    Limit alcohol to one drink per day.    No smoking.     Wear sunscreen to prevent skin cancer.    See your dentist every six months for an exam and cleaning.    Preventive Health Recommendations  Female Ages 26 - 39  Yearly exam:   See your health care provider every year in order to    Review health changes.     Discuss preventive care.       Review your medicines if you your doctor has prescribed any.    Until age 30: Get a Pap test every three years (more often if you have had an abnormal result).    After age 30: Talk to your doctor about whether you should have a Pap test every 3 years or have a Pap test with HPV screening every 5 years.   You do not need a Pap test if your uterus was removed (hysterectomy) and you have not had cancer.  You should be tested each year for STDs (sexually transmitted diseases), if you're at risk.   Talk to your provider about how often to have your cholesterol checked.  If you are at risk for diabetes, you should have a diabetes test (fasting glucose).  Shots: Get a flu shot each year. Get a tetanus shot every 10 years.   Nutrition:     Eat at least 5 servings of fruits and vegetables each day.    Eat whole-grain bread, whole-wheat pasta and brown rice instead of white grains and rice.    Get adequate Calcium and Vitamin D.     Lifestyle    Exercise at least 150 minutes a week (30 minutes a day, 5 days of the week). This will help you control your weight and prevent disease.    Limit alcohol to one drink per day.    No smoking.     Wear sunscreen to prevent skin cancer.    See your dentist every six months for an exam and cleaning.

## 2019-08-29 NOTE — PROGRESS NOTES
"   SUBJECTIVE:   CC: Aubree Corcoran is an 34 year old woman who presents for preventive health visit.     Healthy Habits:    Do you get at least three servings of calcium containing foods daily (dairy, green leafy vegetables, etc.)? { :452888::\"yes\"}    Amount of exercise or daily activities, outside of work: { :359082}    Problems taking medications regularly { :416276::\"No\"}    Medication side effects: { :424728::\"No\"}    Have you had an eye exam in the past two years? { :467901}    Do you see a dentist twice per year? { :493564}    Do you have sleep apnea, excessive snoring or daytime drowsiness?{ :681218}  {Outside tests to abstract? :045590}    Depression and Anxiety Follow-Up    How are you doing with your depression since your last visit? { :139001::\"No change\"}    How are you doing with your anxiety since your last visit?  { :489116::\"No change\"}    Are you having other symptoms that might be associated with depression or anxiety? { :503914}    Have you had a significant life event? { :563448}     Do you have any concerns with your use of alcohol or other drugs? { :772463}    Social History     Tobacco Use     Smoking status: Current Every Day Smoker     Packs/day: 0.50     Years: 15.00     Pack years: 7.50     Types: Cigarettes     Smokeless tobacco: Never Used   Substance Use Topics     Alcohol use: No     Drug use: No     PHQ 4/7/2017 9/6/2018   PHQ-9 Total Score 0 0   Q9: Thoughts of better off dead/self-harm past 2 weeks Not at all Not at all     JESSICA-7 SCORE 4/7/2017 9/6/2018   Total Score 0 0     {Last PHQ9 or GAD7 Responses (Optional):496865}  {PROVIDER ONLY Complete follow-up questions for patients who report suicide ideation  (Optional):198623}    Suicide Assessment Five-step Evaluation and Treatment (SAFE-T)    Today's PHQ-2 Score: No flowsheet data found.  {PHQ-2 LOOK IN ASSESSMENTS (Optional) :779140}  Abuse: Current or Past(Physical, Sexual or Emotional)- {YES/NO/NA:623125}  Do you feel safe " "in your environment? {YES/NO/NA:072845}    Social History     Tobacco Use     Smoking status: Current Every Day Smoker     Packs/day: 0.50     Years: 15.00     Pack years: 7.50     Types: Cigarettes     Smokeless tobacco: Never Used   Substance Use Topics     Alcohol use: No     If you drink alcohol do you typically have >3 drinks per day or >7 drinks per week? {ETOH :077401}                     Reviewed orders with patient.  Reviewed health maintenance and updated orders accordingly - {Yes/No:709794::\"Yes\"}  {Chronicprobdata (Optional):058590}    {Mammo Decision Support (Optional):657474}    Pertinent mammograms are reviewed under the imaging tab.  History of abnormal Pap smear: {PAP HX:997678}     Reviewed and updated as needed this visit by clinical staff         Reviewed and updated as needed this visit by Provider        {HISTORY OPTIONS (Optional):952821}    ROS:  { :336177}    OBJECTIVE:   There were no vitals taken for this visit.  EXAM:  {Exam Choices:643280}    {Diagnostic Test Results (Optional):488945::\"Diagnostic Test Results:\",\"Labs reviewed in Epic\"}    ASSESSMENT/PLAN:   {Diag Picklist:776723}    COUNSELING:   {FEMALE COUNSELING MESSAGES:404268::\"Reviewed preventive health counseling, as reflected in patient instructions\"}    Estimated body mass index is 32.43 kg/m  as calculated from the following:    Height as of 5/4/18: 1.613 m (5' 3.5\").    Weight as of 7/10/19: 84.4 kg (186 lb).    {Weight Management Plan (ACO) Complete if BMI is abnormal-  Ages 18-64  BMI >24.9.  Age 65+ with BMI <23 or >30 (Optional):811700}     reports that she has been smoking cigarettes.  She has a 7.50 pack-year smoking history. She has never used smokeless tobacco.  {Tobacco Cessation -- Complete if patient is a smoker (Optional):535670}    Counseling Resources:  ATP IV Guidelines  Pooled Cohorts Equation Calculator  Breast Cancer Risk Calculator  FRAX Risk Assessment  ICSI Preventive Guidelines  Dietary Guidelines for " Americans, 2010  USDA's MyPlate  ASA Prophylaxis  Lung CA Screening    Kendra Bailey MD  [facility] [place]

## 2019-08-29 NOTE — PROGRESS NOTES
SUBJECTIVE:   CC: Aubree Corcoran is an 34 year old woman who presents for preventive health visit.     Healthy Habits:    Do you get at least three servings of calcium containing foods daily (dairy, green leafy vegetables, etc.)? no, taking calcium and/or vitamin D supplement: no    Amount of exercise or daily activities, outside of work: 2 day(s) per week    Problems taking medications regularly No    Medication side effects: No    Have you had an eye exam in the past two years? yes    Do you see a dentist twice per year? no    Do you have sleep apnea, excessive snoring or daytime drowsiness?no    On Depo Provera    Due for pap - last one [facility] 2014 negative; no prior abnormal    Declines std testing    Due for tetanus    1/2 ppd - interested in Zyban    GERD - loss of coverage Protonix; if misses a day - gets heartburn; no abnormal bleeding; using lots of Tums    Family history of CAD - mom with multiple stents, onset early 50s; brother with stent to LAD age 37    Depression and Anxiety Follow-Up    How are you doing with your depression since your last visit? No change    How are you doing with your anxiety since your last visit?  No change    Are you having other symptoms that might be associated with depression or anxiety? No    Have you had a significant life event? No     Do you have any concerns with your use of alcohol or other drugs? No     Applying for jobs still    No other antidepressants since her teenage years - Paxil, Elavil, possibly others; does not recall Wellbutrin    Social History     Tobacco Use     Smoking status: Current Every Day Smoker     Packs/day: 0.50     Years: 15.00     Pack years: 7.50     Types: Cigarettes     Smokeless tobacco: Never Used   Substance Use Topics     Alcohol use: No     Drug use: No     PHQ 4/7/2017 9/6/2018   PHQ-9 Total Score 0 0   Q9: Thoughts of better off dead/self-harm past 2 weeks Not at all Not at all     JESSICA-7 SCORE 4/7/2017 9/6/2018   Total Score  Patient is scheduled for a colonoscopy with  Dr. Michele Pastor on 6/7  Please send Nulytely prep to selected pharmacy 0 0       Suicide Assessment Five-step Evaluation and Treatment (SAFE-T)      Today's PHQ-2 Score: No flowsheet data found.    Abuse: Current or Past(Physical, Sexual or Emotional)- No  Do you feel safe in your environment? Yes    Social History     Tobacco Use     Smoking status: Current Every Day Smoker     Packs/day: 0.50     Years: 15.00     Pack years: 7.50     Types: Cigarettes     Smokeless tobacco: Never Used   Substance Use Topics     Alcohol use: No     If you drink alcohol do you typically have >3 drinks per day or >7 drinks per week? No                     Reviewed orders with patient.  Reviewed health maintenance and updated orders accordingly - Yes  Current Outpatient Medications   Medication     buPROPion (WELLBUTRIN SR) 150 MG 12 hr tablet     dicyclomine (BENTYL) 20 MG tablet     esomeprazole (NEXIUM) 40 MG DR capsule     hydrOXYzine (VISTARIL) 25 MG capsule     loratadine-pseudoePHEDrine (CLARITIN-D 24-HOUR)  MG 24 hr tablet     medroxyPROGESTERone (DEPO-PROVERA) 150 MG/ML injection     pantoprazole (PROTONIX) 40 MG EC tablet     prochlorperazine (COMPAZINE) 5 MG tablet     propranolol ER (INDERAL LA) 120 MG 24 hr capsule     sertraline (ZOLOFT) 100 MG tablet     SUMAtriptan (IMITREX) 100 MG tablet     Current Facility-Administered Medications   Medication     medroxyPROGESTERone (DEPO-PROVERA) injection 150 mg       Labs reviewed in EPIC    Mammogram not appropriate for this patient based on age.    Pertinent mammograms are reviewed under the imaging tab.  History of abnormal Pap smear: NO - age 30-65 PAP every 5 years with negative HPV co-testing recommended     Reviewed and updated as needed this visit by clinical staff  Tobacco  Allergies  Meds         Reviewed and updated as needed this visit by Provider  Allergies  Meds        Past Medical History:   Diagnosis Date     Family history of heart disease in female family member before age 65 4/7/2017     Family history of heart disease  "in male family member before age 55 4/7/2017     Gastroesophageal reflux disease, esophagitis presence not specified 4/7/2017     Irritable bowel syndrome with both constipation and diarrhea 4/7/2017     Migraine without aura and without status migrainosus, not intractable 04/07/2017    Dr Cade, neurology     Tobacco use disorder       Past Surgical History:   Procedure Laterality Date     ORTHOPEDIC SURGERY  2002    ankle surgery- bone spur removal       ROS:  CONSTITUTIONAL: NEGATIVE for fever, chills, change in weight  INTEGUMENTARU/SKIN: NEGATIVE for worrisome rashes, moles or lesions  EYES: NEGATIVE for vision changes or irritation  ENT: NEGATIVE for ear, mouth and throat problems  RESP: NEGATIVE for significant cough or SOB  BREAST: NEGATIVE for masses, tenderness or discharge  CV: NEGATIVE for chest pain, palpitations or peripheral edema  GI: NEGATIVE for nausea, abdominal pain, heartburn, or change in bowel habits  : NEGATIVE for unusual urinary or vaginal symptoms. Periods are regular.  MUSCULOSKELETAL: NEGATIVE for significant arthralgias or myalgia  NEURO: NEGATIVE for weakness, dizziness or paresthesias  PSYCHIATRIC: POSITIVE foranxiety and depressed mood    OBJECTIVE:   /70   Pulse 82   Temp 97.6  F (36.4  C) (Tympanic)   Ht 1.626 m (5' 4\")   Wt 82.6 kg (182 lb 3.2 oz)   SpO2 99%   BMI 31.27 kg/m    EXAM:  GENERAL: alert, no distress and obese  EYES: Eyes grossly normal to inspection, PERRL and conjunctivae and sclerae normal  HENT: ear canals and TM's normal, nose and mouth without ulcers or lesions  NECK: no adenopathy, no asymmetry, masses, or scars and thyroid normal to palpation  RESP: lungs clear to auscultation - no rales, rhonchi or wheezes  BREAST: normal without masses, tenderness or nipple discharge and no palpable axillary masses or adenopathy  CV: regular rate and rhythm, normal S1 S2, no S3 or S4, no murmur, click or rub, no peripheral edema and peripheral pulses " strong  ABDOMEN: soft, nontender, no hepatosplenomegaly, no masses and bowel sounds normal   (female): normal female external genitalia, normal urethral meatus, vaginal mucosa pink, moist, well rugated, and normal cervix/adnexa/uterus without masses or discharge  MS: no gross musculoskeletal defects noted, no edema  SKIN: no suspicious lesions or rashes  NEURO: Normal strength and tone, mentation intact and speech normal  PSYCH: mentation appears normal, affect normal/bright    Diagnostic Test Results:  Labs reviewed in Epic  Future labs ordered    ASSESSMENT/PLAN:       ICD-10-CM    1. Routine general medical examination at a health care facility Z00.00 A pap thin layer screen with  HPV - recommended age 30 - 65 years (select HPV order below)     HPV High Risk Types DNA Cervical     Comprehensive metabolic panel     TSH with free T4 reflex     CBC with platelets and differential   2. Anxiety F41.9    3. Current episode of major depressive disorder without prior episode, unspecified depression episode severity F32.9 buPROPion (WELLBUTRIN SR) 150 MG 12 hr tablet   4. Tobacco use disorder F17.200 buPROPion (WELLBUTRIN SR) 150 MG 12 hr tablet   5. Encounter for tobacco use cessation counseling Z71.6 buPROPion (WELLBUTRIN SR) 150 MG 12 hr tablet   6. Migraine without aura and without status migrainosus, not intractable G43.009    7. Irritable bowel syndrome with both constipation and diarrhea K58.2    8. Gastroesophageal reflux disease, esophagitis presence not specified K21.9 CBC with platelets and differential     esomeprazole (NEXIUM) 40 MG DR capsule   9. Family history of heart disease Z82.49 Lipid Profile (Chol, Trig, HDL, LDL calc)     CARDIOLOGY EVAL ADULT REFERRAL   10. Lipid screening Z13.220 Lipid Profile (Chol, Trig, HDL, LDL calc)   11. Screening for diabetes mellitus Z13.1 TSH with free T4 reflex   12. Obesity (BMI 30.0-34.9) E66.9 Lipid Profile (Chol, Trig, HDL, LDL calc)     Comprehensive metabolic  "panel     TSH with free T4 reflex     Will call with pap results.  Future lab orders in place - fasting.  Cardiology consult for family history early heart disease.  Add Wellbutrin for mood and smoking cessation.  Follow up 1 month to assess response, sooner if concerns.  Tetanus updated.    Flu shot advised when available.  Nexium in place of protonix if covered.    COUNSELING:   Reviewed preventive health counseling, as reflected in patient instructions       Regular exercise       Healthy diet/nutrition       Vision screening       Hearing screening       Immunizations    Vaccinated for: Td             Alcohol Use       Contraception       Osteoporosis Prevention/Bone Health       Safe sex practices/STD prevention       HIV screeninx in teen years, 1x in adult years, and at intervals if high risk    Estimated body mass index is 31.27 kg/m  as calculated from the following:    Height as of this encounter: 1.626 m (5' 4\").    Weight as of this encounter: 82.6 kg (182 lb 3.2 oz).    Weight management plan: Discussed healthy diet and exercise guidelines     reports that she has been smoking cigarettes.  She has a 7.50 pack-year smoking history. She has never used smokeless tobacco.  Tobacco Cessation Action Plan: Pharmacotherapies : Zyban/Wellbutrin    Counseling Resources:  ATP IV Guidelines  Pooled Cohorts Equation Calculator  Breast Cancer Risk Calculator  FRAX Risk Assessment  ICSI Preventive Guidelines  Dietary Guidelines for Americans, 2010  USDA's MyPlate  ASA Prophylaxis  Lung CA Screening    Kendra Bailey MD  [facility] [place]  "

## 2019-09-04 ENCOUNTER — OFFICE VISIT (OUTPATIENT)
Dept: FAMILY MEDICINE | Facility: OTHER | Age: 34
End: 2019-09-04
Attending: FAMILY MEDICINE
Payer: COMMERCIAL

## 2019-09-04 ENCOUNTER — TELEPHONE (OUTPATIENT)
Dept: FAMILY MEDICINE | Facility: OTHER | Age: 34
End: 2019-09-04

## 2019-09-04 VITALS
BODY MASS INDEX: 31.1 KG/M2 | OXYGEN SATURATION: 99 % | HEIGHT: 64 IN | TEMPERATURE: 97.6 F | DIASTOLIC BLOOD PRESSURE: 70 MMHG | HEART RATE: 82 BPM | SYSTOLIC BLOOD PRESSURE: 102 MMHG | WEIGHT: 182.2 LBS

## 2019-09-04 DIAGNOSIS — E66.811 OBESITY (BMI 30.0-34.9): ICD-10-CM

## 2019-09-04 DIAGNOSIS — Z23 NEED FOR VACCINATION: ICD-10-CM

## 2019-09-04 DIAGNOSIS — F32.9 CURRENT EPISODE OF MAJOR DEPRESSIVE DISORDER WITHOUT PRIOR EPISODE, UNSPECIFIED DEPRESSION EPISODE SEVERITY: ICD-10-CM

## 2019-09-04 DIAGNOSIS — F41.9 ANXIETY: ICD-10-CM

## 2019-09-04 DIAGNOSIS — K58.2 IRRITABLE BOWEL SYNDROME WITH BOTH CONSTIPATION AND DIARRHEA: ICD-10-CM

## 2019-09-04 DIAGNOSIS — Z13.220 LIPID SCREENING: ICD-10-CM

## 2019-09-04 DIAGNOSIS — G43.009 MIGRAINE WITHOUT AURA AND WITHOUT STATUS MIGRAINOSUS, NOT INTRACTABLE: ICD-10-CM

## 2019-09-04 DIAGNOSIS — F17.200 TOBACCO USE DISORDER: ICD-10-CM

## 2019-09-04 DIAGNOSIS — Z71.6 ENCOUNTER FOR TOBACCO USE CESSATION COUNSELING: ICD-10-CM

## 2019-09-04 DIAGNOSIS — Z00.00 ROUTINE GENERAL MEDICAL EXAMINATION AT A HEALTH CARE FACILITY: Primary | ICD-10-CM

## 2019-09-04 DIAGNOSIS — K21.9 GASTROESOPHAGEAL REFLUX DISEASE, ESOPHAGITIS PRESENCE NOT SPECIFIED: ICD-10-CM

## 2019-09-04 DIAGNOSIS — Z13.1 SCREENING FOR DIABETES MELLITUS: ICD-10-CM

## 2019-09-04 DIAGNOSIS — Z82.49 FAMILY HISTORY OF HEART DISEASE: ICD-10-CM

## 2019-09-04 PROCEDURE — G0476 HPV COMBO ASSAY CA SCREEN: HCPCS | Mod: ZL | Performed by: FAMILY MEDICINE

## 2019-09-04 PROCEDURE — 99395 PREV VISIT EST AGE 18-39: CPT | Performed by: FAMILY MEDICINE

## 2019-09-04 PROCEDURE — 90471 IMMUNIZATION ADMIN: CPT | Performed by: FAMILY MEDICINE

## 2019-09-04 PROCEDURE — G0123 SCREEN CERV/VAG THIN LAYER: HCPCS | Mod: ZL | Performed by: FAMILY MEDICINE

## 2019-09-04 PROCEDURE — 90714 TD VACC NO PRESV 7 YRS+ IM: CPT

## 2019-09-04 RX ORDER — BUPROPION HYDROCHLORIDE 150 MG/1
150 TABLET, EXTENDED RELEASE ORAL 2 TIMES DAILY
Qty: 60 TABLET | Refills: 1 | Status: SHIPPED | OUTPATIENT
Start: 2019-09-04 | End: 2019-11-01

## 2019-09-04 RX ORDER — ESOMEPRAZOLE MAGNESIUM 40 MG/1
40 CAPSULE, DELAYED RELEASE ORAL
Qty: 30 CAPSULE | Refills: 1 | Status: SHIPPED | OUTPATIENT
Start: 2019-09-04 | End: 2020-01-15

## 2019-09-04 ASSESSMENT — MIFFLIN-ST. JEOR: SCORE: 1511.45

## 2019-09-04 ASSESSMENT — PAIN SCALES - GENERAL: PAINLEVEL: NO PAIN (0)

## 2019-09-04 NOTE — NURSING NOTE
"Chief Complaint   Patient presents with     Physical     Depression     Anxiety       Initial /70   Pulse 82   Temp 97.6  F (36.4  C) (Tympanic)   Ht 1.626 m (5' 4\")   Wt 82.6 kg (182 lb 3.2 oz)   SpO2 99%   BMI 31.27 kg/m   Estimated body mass index is 31.27 kg/m  as calculated from the following:    Height as of this encounter: 1.626 m (5' 4\").    Weight as of this encounter: 82.6 kg (182 lb 3.2 oz).  Medication Reconciliation: complete  "

## 2019-09-04 NOTE — TELEPHONE ENCOUNTER
Received PA request from [facility] for Esomeprazole. Submitted as urgent request through [facility]. Waiting for response.

## 2019-09-05 NOTE — TELEPHONE ENCOUNTER
"DENIAL received from [facility] for Esomeprazole. Denial reason: \"Patient is able to get up to 30 capsules per 30 days for a maximum of 120 days within 365 days. This is the duration limit set by patient's plan for Proton Pump Inhibitors. This limit is shared across all PPIs.\" Pharmacy advised. Forms scanned to [employer].  "

## 2019-09-09 LAB
COPATH REPORT: NORMAL
PAP: NORMAL

## 2019-09-10 LAB
FINAL DIAGNOSIS: NORMAL
HPV HR 12 DNA CVX QL NAA+PROBE: NEGATIVE
HPV16 DNA SPEC QL NAA+PROBE: NEGATIVE
HPV18 DNA SPEC QL NAA+PROBE: NEGATIVE
SPECIMEN DESCRIPTION: NORMAL
SPECIMEN SOURCE CVX/VAG CYTO: NORMAL

## 2019-09-16 DIAGNOSIS — G47.00 INSOMNIA, UNSPECIFIED TYPE: ICD-10-CM

## 2019-09-16 DIAGNOSIS — K58.2 IRRITABLE BOWEL SYNDROME WITH BOTH CONSTIPATION AND DIARRHEA: ICD-10-CM

## 2019-09-16 DIAGNOSIS — F41.9 ANXIETY: ICD-10-CM

## 2019-09-16 DIAGNOSIS — G43.009 MIGRAINE WITHOUT AURA AND WITHOUT STATUS MIGRAINOSUS, NOT INTRACTABLE: ICD-10-CM

## 2019-09-16 RX ORDER — HYDROXYZINE PAMOATE 25 MG/1
CAPSULE ORAL
Qty: 60 CAPSULE | Refills: 0 | Status: SHIPPED | OUTPATIENT
Start: 2019-09-16 | End: 2019-10-16

## 2019-09-16 RX ORDER — SUMATRIPTAN 100 MG/1
TABLET, FILM COATED ORAL
Qty: 9 TABLET | Refills: 0 | Status: SHIPPED | OUTPATIENT
Start: 2019-09-16 | End: 2019-10-16

## 2019-09-16 RX ORDER — DICYCLOMINE HCL 20 MG
TABLET ORAL
Qty: 90 TABLET | Refills: 0 | Status: SHIPPED | OUTPATIENT
Start: 2019-09-16 | End: 2020-01-15

## 2019-10-21 ENCOUNTER — ALLIED HEALTH/NURSE VISIT (OUTPATIENT)
Dept: ALLERGY | Facility: OTHER | Age: 34
End: 2019-10-21
Attending: FAMILY MEDICINE
Payer: COMMERCIAL

## 2019-10-21 DIAGNOSIS — Z30.42 ENCOUNTER FOR SURVEILLANCE OF INJECTABLE CONTRACEPTIVE: Primary | ICD-10-CM

## 2019-10-21 PROCEDURE — 96372 THER/PROPH/DIAG INJ SC/IM: CPT

## 2019-10-21 RX ADMIN — MEDROXYPROGESTERONE ACETATE 150 MG: 150 INJECTION, SUSPENSION INTRAMUSCULAR at 14:21

## 2019-10-21 NOTE — PROGRESS NOTES
Clinic Administered Medication Documentation    MEDICATION LIST:   Depo Provera Documentation    Prior to injection, verified patient identity using patient's name and date of birth. Medication was administered. Please see MAR and medication order for additional information. Patient instructed to remain in clinic for 15 minutes.    BP: Data Unavailable    LAST PAP/EXAM:   Lab Results   Component Value Date    PAP NIL 09/04/2019     URINE HCG:not indicated    NEXT INJECTION DUE: 1/6/20 - 1/20/20    Was entire vial of medication used? Yes  Vial/Syringe: Single dose vial  Expiration Date:  11/2021    IRON LOO LPN

## 2019-11-12 DIAGNOSIS — R09.81 NASAL CONGESTION: ICD-10-CM

## 2019-11-13 RX ORDER — LORATADINE AND PSEUDOEPHEDRINE SULFATE 10; 240 MG/1; MG/1
TABLET, FILM COATED, EXTENDED RELEASE ORAL
Qty: 30 TABLET | Refills: 0 | Status: SHIPPED | OUTPATIENT
Start: 2019-11-13 | End: 2019-12-16

## 2019-11-13 NOTE — TELEPHONE ENCOUNTER
loratadine-pseudoePHEDrine (CLARITIN-D 24-HOUR)  MG 24 hr tablet      Last Written Prescription Date:  7-10-19  Last Fill Quantity: 30,   # refills: 3  Last Office Visit: 9-4-19  Future Office visit:       Routing refill request to provider for review/approval because:  Drug not on the [facility] or [facility] refill protocol or controlled substance

## 2019-12-16 DIAGNOSIS — R09.81 NASAL CONGESTION: ICD-10-CM

## 2019-12-18 RX ORDER — LORATADINE AND PSEUDOEPHEDRINE SULFATE 10; 240 MG/1; MG/1
TABLET, FILM COATED, EXTENDED RELEASE ORAL
Qty: 30 TABLET | Refills: 0 | Status: SHIPPED | OUTPATIENT
Start: 2019-12-18 | End: 2020-01-15

## 2020-01-13 NOTE — PROGRESS NOTES
Subjective     Aubree Corcoran is a 34 year old female who presents to clinic today for the following health issues:    HPI   Depression and Anxiety Follow-Up    How are you doing with your depression since your last visit? No change    How are you doing with your anxiety since your last visit?  No change    Are you having other symptoms that might be associated with depression or anxiety? No    Have you had a significant life event? No     Do you have any concerns with your use of alcohol or other drugs? No     wellbutrin added 9/2019; stopped; was having body jerks when she was trying to fall asleep; stopped medication and it resolved    Still on the zoloft    No new stressors; no changes    No counseling    Prior Paxil    On Zoloft for years    Feels like it is equal depression/anxiety    Social History     Tobacco Use     Smoking status: Current Every Day Smoker     Packs/day: 0.50     Years: 15.00     Pack years: 7.50     Types: Cigarettes     Smokeless tobacco: Never Used   Substance Use Topics     Alcohol use: No     Drug use: No     PHQ 4/7/2017 9/6/2018   PHQ-9 Total Score 0 0   Q9: Thoughts of better off dead/self-harm past 2 weeks Not at all Not at all     JESSICA-7 SCORE 4/7/2017 9/6/2018   Total Score 0 0           Suicide Assessment Five-step Evaluation and Treatment (SAFE-T)    Migraine     Since your last clinic visit, how have your headaches changed?  No change    How often are you getting headaches or migraines? Couple times per week     Are you able to do normal daily activities when you have a migraine? Yes    Are you taking rescue/relief medications? (Select all that apply) Other: imitrex    How helpful is your rescue/relief medication?  I get some relief    Are you taking any medications to prevent migraines? (Select all that apply)  Other: ibuprofen    In the past 4 weeks, how often have you gone to urgent care or the emergency room because of your headaches?  0     Inderal 120 mg  daily    imitrex - works fairly well    Needs refill of Claritin D  -allergies is a trigger  - ?rebound      How many servings of fruits and vegetables do you eat daily?  0-1    On average, how many sweetened beverages do you drink each day (Examples: soda, juice, sweet tea, etc.  Do NOT count diet or artificially sweetened beverages)?   3    How many days per week do you miss taking your medication? 0    GERD -   Insurance won't cover Nexium or Protonix.  Insurance changed.  Using lots of Tums.  No bleeding. No vomiting.          Current Outpatient Medications   Medication     dicyclomine (BENTYL) 20 MG tablet     FLUoxetine (PROZAC) 20 MG capsule     hydrOXYzine (VISTARIL) 25 MG capsule     loratadine-pseudoePHEDrine (CLARITIN-D 24-HOUR)  MG 24 hr tablet     pantoprazole (PROTONIX) 40 MG EC tablet     prochlorperazine (COMPAZINE) 5 MG tablet     propranolol ER (INDERAL LA) 120 MG 24 hr capsule     SUMAtriptan (IMITREX) 100 MG tablet     medroxyPROGESTERone (DEPO-PROVERA) 150 MG/ML injection     Current Facility-Administered Medications   Medication     medroxyPROGESTERone (DEPO-PROVERA) injection 150 mg     medroxyPROGESTERone (DEPO-PROVERA) injection 150 mg       Patient Active Problem List   Diagnosis     ACP (advance care planning)     Tobacco use disorder     Migraine without aura and without status migrainosus, not intractable     Gastroesophageal reflux disease, esophagitis presence not specified     Irritable bowel syndrome with both constipation and diarrhea     Family history of heart disease in male family member before age 55     Single current episode of major depressive disorder, unspecified depression episode severity     Anxiety     Insomnia, unspecified type     Past Surgical History:   Procedure Laterality Date     ORTHOPEDIC SURGERY  2002    ankle surgery- bone spur removal       Social History     Tobacco Use     Smoking status: Current Every Day Smoker     Packs/day: 0.50     Years: 15.00  "    Pack years: 7.50     Types: Cigarettes     Smokeless tobacco: Never Used   Substance Use Topics     Alcohol use: No     Family History   Problem Relation Age of Onset     Heart Disease Mother         8 stents; onset 40s?     Chronic Obstructive Pulmonary Disease Mother      Cancer Father         mouth and throat cancer     Heart Disease Brother         stents; age 37; occlusive disease     Bladder Cancer Brother              Reviewed and updated as needed this visit by Provider  Tobacco  Allergies  Meds  Med Hx  Surg Hx  Fam Hx  Soc Hx        Review of Systems   ROS COMP: Constitutional, HEENT, cardiovascular, pulmonary, gi and gu systems are negative, except as otherwise noted.      Objective    /82 (BP Location: Right arm, Patient Position: Sitting, Cuff Size: Adult Regular)   Pulse 76   Temp 98.9  F (37.2  C) (Tympanic)   Ht 1.626 m (5' 4\")   Wt 84.4 kg (186 lb)   SpO2 98%   BMI 31.93 kg/m    Body mass index is 31.93 kg/m .  Physical Exam   GENERAL: healthy, alert and no distress  EYES: Eyes grossly normal to inspection, PERRL and conjunctivae and sclerae normal  HENT: ear canals and TM's normal, nose and mouth without ulcers or lesions  HENT: nasal mucosa edematous   NECK: no adenopathy, no asymmetry, masses, or scars and thyroid normal to palpation  RESP: lungs clear to auscultation - no rales, rhonchi or wheezes  CV: regular rate and rhythm, normal S1 S2, no S3 or S4, no murmur, click or rub, no peripheral edema and peripheral pulses strong  ABDOMEN: soft, nontender, no hepatosplenomegaly, no masses and bowel sounds normal  MS: no gross musculoskeletal defects noted, no edema  PSYCH: mentation appears normal, affect normal/bright    Diagnostic Test Results:  Labs reviewed in Epic        Assessment & Plan       ICD-10-CM    1. Migraine without aura and without status migrainosus, not intractable G43.009 loratadine-pseudoePHEDrine (CLARITIN-D 24-HOUR)  MG 24 hr tablet   2. " Gastroesophageal reflux disease, esophagitis presence not specified K21.9 pantoprazole (PROTONIX) 40 MG EC tablet   3. Current episode of major depressive disorder without prior episode, unspecified depression episode severity F32.9 FLUoxetine (PROZAC) 20 MG capsule   4. Anxiety F41.9 FLUoxetine (PROZAC) 20 MG capsule   5. Tobacco use disorder F17.200    6. Encounter for tobacco use cessation counseling Z71.6    7. Chronic nasal congestion R09.81 loratadine-pseudoePHEDrine (CLARITIN-D 24-HOUR)  MG 24 hr tablet   8. Encounter for management and injection of injectable progestin contraceptive Z30.42 medroxyPROGESTERone (DEPO-PROVERA) injection 150 mg   9. Encounter for immunization Z23 PPSV23, IM/SUBQ (2+ YRS) - [id number]     ADMIN 1st VACCINE          Patient Instructions   Try reducing frequency of Claritin D.  Decongestant may be causing rebound congestion - triggering headaches.  Taper Zoloft from 200 mg daily to 150 mg daily for 1 week, then 100 mg daily for 1 week.    Then go ahead and stop the Zoloft and start the Prozac 20 mg daily.    Will likely need to adjust dose up, but would like to see your or hear from you via [employer].  Protonix sent for reflux.  If not covered, may need to do Prilosec or Prevacid. Nexium not covered.  Depo Provera given.  Pneumovax given.  Smoking cessation advised.          Return in about 6 weeks (around 2/26/2020) for depression/anxiety.    Kendra Bailey MD  [facility] [place]

## 2020-01-15 ENCOUNTER — OFFICE VISIT (OUTPATIENT)
Dept: FAMILY MEDICINE | Facility: OTHER | Age: 35
End: 2020-01-15
Attending: FAMILY MEDICINE
Payer: COMMERCIAL

## 2020-01-15 VITALS
SYSTOLIC BLOOD PRESSURE: 128 MMHG | OXYGEN SATURATION: 98 % | WEIGHT: 186 LBS | TEMPERATURE: 98.9 F | HEART RATE: 76 BPM | BODY MASS INDEX: 31.76 KG/M2 | HEIGHT: 64 IN | DIASTOLIC BLOOD PRESSURE: 82 MMHG

## 2020-01-15 DIAGNOSIS — G43.009 MIGRAINE WITHOUT AURA AND WITHOUT STATUS MIGRAINOSUS, NOT INTRACTABLE: Primary | ICD-10-CM

## 2020-01-15 DIAGNOSIS — F17.200 TOBACCO USE DISORDER: ICD-10-CM

## 2020-01-15 DIAGNOSIS — Z30.42 ENCOUNTER FOR MANAGEMENT AND INJECTION OF INJECTABLE PROGESTIN CONTRACEPTIVE: ICD-10-CM

## 2020-01-15 DIAGNOSIS — F41.9 ANXIETY: ICD-10-CM

## 2020-01-15 DIAGNOSIS — F32.9 CURRENT EPISODE OF MAJOR DEPRESSIVE DISORDER WITHOUT PRIOR EPISODE, UNSPECIFIED DEPRESSION EPISODE SEVERITY: ICD-10-CM

## 2020-01-15 DIAGNOSIS — Z30.42 ENCOUNTER FOR SURVEILLANCE OF INJECTABLE CONTRACEPTIVE: Primary | ICD-10-CM

## 2020-01-15 DIAGNOSIS — R09.81 CHRONIC NASAL CONGESTION: ICD-10-CM

## 2020-01-15 DIAGNOSIS — Z23 ENCOUNTER FOR IMMUNIZATION: ICD-10-CM

## 2020-01-15 DIAGNOSIS — Z71.6 ENCOUNTER FOR TOBACCO USE CESSATION COUNSELING: ICD-10-CM

## 2020-01-15 DIAGNOSIS — K21.9 GASTROESOPHAGEAL REFLUX DISEASE, ESOPHAGITIS PRESENCE NOT SPECIFIED: ICD-10-CM

## 2020-01-15 PROCEDURE — 90732 PPSV23 VACC 2 YRS+ SUBQ/IM: CPT

## 2020-01-15 PROCEDURE — 96372 THER/PROPH/DIAG INJ SC/IM: CPT

## 2020-01-15 PROCEDURE — 99214 OFFICE O/P EST MOD 30 MIN: CPT | Performed by: FAMILY MEDICINE

## 2020-01-15 PROCEDURE — G0463 HOSPITAL OUTPT CLINIC VISIT: HCPCS | Mod: 25

## 2020-01-15 PROCEDURE — 90471 IMMUNIZATION ADMIN: CPT | Performed by: FAMILY MEDICINE

## 2020-01-15 RX ORDER — PANTOPRAZOLE SODIUM 40 MG/1
40 TABLET, DELAYED RELEASE ORAL DAILY
Qty: 30 TABLET | Refills: 5 | Status: SHIPPED | OUTPATIENT
Start: 2020-01-15 | End: 2020-07-15

## 2020-01-15 RX ORDER — MEDROXYPROGESTERONE ACETATE 150 MG/ML
150 INJECTION, SUSPENSION INTRAMUSCULAR
Status: ACTIVE | OUTPATIENT
Start: 2020-01-15 | End: 2020-10-11

## 2020-01-15 RX ORDER — MEDROXYPROGESTERONE ACETATE 150 MG/ML
150 INJECTION, SUSPENSION INTRAMUSCULAR
Status: DISCONTINUED | OUTPATIENT
Start: 2020-01-15 | End: 2020-11-23

## 2020-01-15 RX ADMIN — MEDROXYPROGESTERONE ACETATE 150 MG: 150 INJECTION, SUSPENSION INTRAMUSCULAR at 12:21

## 2020-01-15 ASSESSMENT — PAIN SCALES - GENERAL: PAINLEVEL: NO PAIN (0)

## 2020-01-15 ASSESSMENT — MIFFLIN-ST. JEOR: SCORE: 1528.69

## 2020-01-15 NOTE — PROGRESS NOTES
Patient is here for a Depo-provera injection, but order has .  Last office visit for a routine general medical exam was 19.  Medication is pended if you approve.  Thank you.    Neema Marshall RN

## 2020-01-15 NOTE — PROGRESS NOTES
The following medication was given:     MEDICATION: Depo Provera 150mg  ROUTE: IM  SITE[email or web address]  DOSE: 1ml  LOT #: [id number]  :  [employer]   EXPIRATION DATE:  11/2021  NDC#: [address]-7   Tolerated well.  Pt instructed next Depo Shot due between April2nd and April 16th.   Maday Kowalski LPN

## 2020-01-15 NOTE — NURSING NOTE
"Chief Complaint   Patient presents with     Recheck Medication     Imm/Inj       Initial /82 (BP Location: Right arm, Patient Position: Sitting, Cuff Size: Adult Regular)   Pulse 76   Temp 98.9  F (37.2  C) (Tympanic)   Ht 1.626 m (5' 4\")   Wt 84.4 kg (186 lb)   SpO2 98%   BMI 31.93 kg/m   Estimated body mass index is 31.93 kg/m  as calculated from the following:    Height as of this encounter: 1.626 m (5' 4\").    Weight as of this encounter: 84.4 kg (186 lb).  Medication Reconciliation: complete  Maday Kowalski LPN  "

## 2020-01-15 NOTE — PATIENT INSTRUCTIONS
Try reducing frequency of Claritin D.  Decongestant may be causing rebound congestion - triggering headaches.  Taper Zoloft from 200 mg daily to 150 mg daily for 1 week, then 100 mg daily for 1 week.    Then go ahead and stop the Zoloft and start the Prozac 20 mg daily.    Will likely need to adjust dose up, but would like to see your or hear from you via mychart.  Protonix sent for reflux.  If not covered, may need to do Prilosec or Prevacid. Nexium not covered.  Depo Provera given.  Pneumovax given.  Smoking cessation advised.

## 2020-02-12 DIAGNOSIS — G43.009 MIGRAINE WITHOUT AURA AND WITHOUT STATUS MIGRAINOSUS, NOT INTRACTABLE: ICD-10-CM

## 2020-02-12 DIAGNOSIS — G47.00 INSOMNIA, UNSPECIFIED TYPE: ICD-10-CM

## 2020-02-12 DIAGNOSIS — F41.9 ANXIETY: ICD-10-CM

## 2020-02-17 RX ORDER — HYDROXYZINE PAMOATE 25 MG/1
CAPSULE ORAL
Qty: 60 CAPSULE | Refills: 3 | Status: SHIPPED | OUTPATIENT
Start: 2020-02-17 | End: 2020-06-17

## 2020-02-17 RX ORDER — SUMATRIPTAN 100 MG/1
TABLET, FILM COATED ORAL
Qty: 9 TABLET | Refills: 3 | Status: SHIPPED | OUTPATIENT
Start: 2020-02-17 | End: 2020-06-17

## 2020-03-16 DIAGNOSIS — F32.9 CURRENT EPISODE OF MAJOR DEPRESSIVE DISORDER WITHOUT PRIOR EPISODE, UNSPECIFIED DEPRESSION EPISODE SEVERITY: ICD-10-CM

## 2020-03-16 DIAGNOSIS — F41.9 ANXIETY: ICD-10-CM

## 2020-03-18 NOTE — TELEPHONE ENCOUNTER
Prozac      Last Written Prescription Date:  1/15/2020  Last Fill Quantity: 30,   # refills: 1  Last Office Visit: 1/15/2020

## 2020-03-18 NOTE — TELEPHONE ENCOUNTER
SSRIs Protocol Failed   FLUoxetine (PROZAC) 20 MG capsule [Pharmacy Med Name: FLUOXETINE HCL 20 MG CAPSULE]   3/18/2020  7:58 AM       PHQ-9 score less than 5 in past 6 months       Medication is active on med list       Patient is age 18 or older       No active pregnancy on record       No positive pregnancy test in last 12 months       Recent (6 mo) or future (30 days) visit within the authorizing provider's specialty

## 2020-04-10 ENCOUNTER — ALLIED HEALTH/NURSE VISIT (OUTPATIENT)
Dept: ALLERGY | Facility: OTHER | Age: 35
End: 2020-04-10
Attending: FAMILY MEDICINE
Payer: COMMERCIAL

## 2020-04-10 DIAGNOSIS — Z30.42 ENCOUNTER FOR SURVEILLANCE OF INJECTABLE CONTRACEPTIVE: Primary | ICD-10-CM

## 2020-04-10 PROCEDURE — 96372 THER/PROPH/DIAG INJ SC/IM: CPT

## 2020-04-10 RX ADMIN — MEDROXYPROGESTERONE ACETATE 150 MG: 150 INJECTION, SUSPENSION INTRAMUSCULAR at 10:08

## 2020-04-10 NOTE — PROGRESS NOTES
Clinic Administered Medication Documentation      Depo Provera Documentation    URINE HCG: not indicated    Depo-Provera Standing Order inclusion/exclusion criteria reviewed.   Patient meets: inclusion criteria     BP: Data Unavailable  LAST PAP/EXAM:   Lab Results   Component Value Date    PAP NIL 09/04/2019       Prior to injection, verified patient identity using patient's name and date of birth. Medication was administered. Please see MAR and medication order for additional information.     Was entire vial of medication used? Yes  Vial/Syringe: Single dose vial  Expiration Date:  11/2021    Patient instructed to stay in clinic after the injection but patient declined.  NEXT INJECTION DUE: 6/26/20 - 7/10/20    Aletha Padilla LPN

## 2020-05-20 DIAGNOSIS — R09.81 CHRONIC NASAL CONGESTION: ICD-10-CM

## 2020-05-20 DIAGNOSIS — G43.009 MIGRAINE WITHOUT AURA AND WITHOUT STATUS MIGRAINOSUS, NOT INTRACTABLE: ICD-10-CM

## 2020-05-20 RX ORDER — PROPRANOLOL HYDROCHLORIDE 120 MG/1
CAPSULE, EXTENDED RELEASE ORAL
Qty: 30 CAPSULE | Refills: 2 | Status: SHIPPED | OUTPATIENT
Start: 2020-05-20 | End: 2020-08-19

## 2020-05-20 RX ORDER — LORATADINE AND PSEUDOEPHEDRINE SULFATE 10; 240 MG/1; MG/1
TABLET, FILM COATED, EXTENDED RELEASE ORAL
Qty: 30 TABLET | Refills: 0 | Status: SHIPPED | OUTPATIENT
Start: 2020-05-20 | End: 2020-06-17

## 2020-05-20 NOTE — TELEPHONE ENCOUNTER
Generic Claritin D   Last Written Prescription Date:  1/15/20  Last Fill Quantity: 30,   # refills: 3  Last Office Visit: 1/15/2020  Future Office visit:  none     Routing refill request to provider for review/approval because:  Drug not on the [facility], P or [facility] refill protocol or controlled substance

## 2020-06-15 DIAGNOSIS — R09.81 CHRONIC NASAL CONGESTION: ICD-10-CM

## 2020-06-15 DIAGNOSIS — F41.9 ANXIETY: ICD-10-CM

## 2020-06-15 DIAGNOSIS — G47.00 INSOMNIA, UNSPECIFIED TYPE: ICD-10-CM

## 2020-06-15 DIAGNOSIS — G43.009 MIGRAINE WITHOUT AURA AND WITHOUT STATUS MIGRAINOSUS, NOT INTRACTABLE: ICD-10-CM

## 2020-06-16 NOTE — TELEPHONE ENCOUNTER
Allergy relief/ nasal decongest      Last Written Prescription Date:  5/20/2020  Last Fill Quantity: 30,   # refills: 0  Last Office Visit: 1/15/2020    visatril      Last Written Prescription Date:  2/17/2020  Last Fill Quantity: 60,   # refills: 0  Last Office Visit: 1/15/2020    imitrex      Last Written Prescription Date:  2/17/2020  Last Fill Quantity: 9,   # refills: 3  Last Office Visit: 1/15/2020

## 2020-06-17 RX ORDER — LORATADINE AND PSEUDOEPHEDRINE SULFATE 10; 240 MG/1; MG/1
TABLET, FILM COATED, EXTENDED RELEASE ORAL
Qty: 30 TABLET | Refills: 0 | Status: SHIPPED | OUTPATIENT
Start: 2020-06-17 | End: 2020-07-15

## 2020-06-17 RX ORDER — HYDROXYZINE PAMOATE 25 MG/1
CAPSULE ORAL
Qty: 60 CAPSULE | Refills: 0 | Status: SHIPPED | OUTPATIENT
Start: 2020-06-17 | End: 2020-07-15

## 2020-06-17 RX ORDER — SUMATRIPTAN 100 MG/1
TABLET, FILM COATED ORAL
Qty: 9 TABLET | Refills: 0 | Status: SHIPPED | OUTPATIENT
Start: 2020-06-17 | End: 2020-07-22

## 2020-07-06 ENCOUNTER — ALLIED HEALTH/NURSE VISIT (OUTPATIENT)
Dept: ALLERGY | Facility: OTHER | Age: 35
End: 2020-07-06
Attending: FAMILY MEDICINE
Payer: COMMERCIAL

## 2020-07-06 DIAGNOSIS — Z30.42 ENCOUNTER FOR SURVEILLANCE OF INJECTABLE CONTRACEPTIVE: Primary | ICD-10-CM

## 2020-07-06 PROCEDURE — 96372 THER/PROPH/DIAG INJ SC/IM: CPT

## 2020-07-06 RX ADMIN — MEDROXYPROGESTERONE ACETATE 150 MG: 150 INJECTION, SUSPENSION INTRAMUSCULAR at 14:52

## 2020-07-06 NOTE — PROGRESS NOTES
Clinic Administered Medication Documentation      Depo Provera Documentation    URINE HCG: not indicated    Depo-Provera Standing Order inclusion/exclusion criteria reviewed.   Patient meets: inclusion criteria     BP: Data Unavailable  LAST PAP/EXAM:   Lab Results   Component Value Date    PAP NIL 09/04/2019       Prior to injection, verified patient identity using patient's name and date of birth. Medication was administered. Please see MAR and medication order for additional information.     Was entire vial of medication used? Yes  Vial/Syringe: Single dose vial  Expiration Date:  11/2021    Patient instructed to report any adverse reaction to staff immediately .  NEXT INJECTION DUE: 9/21/20 - 10/5/20    Left gluteus.    Missy Harding LPN

## 2020-07-14 DIAGNOSIS — G43.009 MIGRAINE WITHOUT AURA AND WITHOUT STATUS MIGRAINOSUS, NOT INTRACTABLE: ICD-10-CM

## 2020-07-14 DIAGNOSIS — F41.9 ANXIETY: ICD-10-CM

## 2020-07-14 DIAGNOSIS — R09.81 CHRONIC NASAL CONGESTION: ICD-10-CM

## 2020-07-14 DIAGNOSIS — K21.9 GASTROESOPHAGEAL REFLUX DISEASE, ESOPHAGITIS PRESENCE NOT SPECIFIED: ICD-10-CM

## 2020-07-14 DIAGNOSIS — F32.9 CURRENT EPISODE OF MAJOR DEPRESSIVE DISORDER WITHOUT PRIOR EPISODE, UNSPECIFIED DEPRESSION EPISODE SEVERITY: ICD-10-CM

## 2020-07-14 DIAGNOSIS — G47.00 INSOMNIA, UNSPECIFIED TYPE: ICD-10-CM

## 2020-07-15 RX ORDER — HYDROXYZINE PAMOATE 25 MG/1
CAPSULE ORAL
Qty: 60 CAPSULE | Refills: 0 | Status: SHIPPED | OUTPATIENT
Start: 2020-07-15 | End: 2020-08-19

## 2020-07-15 RX ORDER — LORATADINE AND PSEUDOEPHEDRINE SULFATE 10; 240 MG/1; MG/1
TABLET, FILM COATED, EXTENDED RELEASE ORAL
Qty: 30 TABLET | Refills: 0 | Status: SHIPPED | OUTPATIENT
Start: 2020-07-15 | End: 2020-08-19

## 2020-07-15 RX ORDER — PANTOPRAZOLE SODIUM 40 MG/1
TABLET, DELAYED RELEASE ORAL
Qty: 30 TABLET | Refills: 0 | Status: SHIPPED | OUTPATIENT
Start: 2020-07-15 | End: 2020-08-19

## 2020-07-15 NOTE — TELEPHONE ENCOUNTER
LOV 1/15/20    Allergy relief 6/17/20 #30  protonix 1/15/20 #30 with 5  prozac 3/18/20 #30 with 3  Hydroxyzine 6/17/20 #60

## 2020-07-21 DIAGNOSIS — G43.009 MIGRAINE WITHOUT AURA AND WITHOUT STATUS MIGRAINOSUS, NOT INTRACTABLE: ICD-10-CM

## 2020-07-22 RX ORDER — SUMATRIPTAN 100 MG/1
TABLET, FILM COATED ORAL
Qty: 9 TABLET | Refills: 0 | Status: SHIPPED | OUTPATIENT
Start: 2020-07-22 | End: 2020-08-19

## 2020-07-22 NOTE — TELEPHONE ENCOUNTER
Sumatriptan 100 MG      Last Written Prescription Date:  6-  Last Fill Quantity: 9,   # refills: 0  Last Office Visit: 1-  Future Office visit:

## 2020-08-19 DIAGNOSIS — G47.00 INSOMNIA, UNSPECIFIED TYPE: ICD-10-CM

## 2020-08-19 DIAGNOSIS — R09.81 CHRONIC NASAL CONGESTION: ICD-10-CM

## 2020-08-19 DIAGNOSIS — F32.9 CURRENT EPISODE OF MAJOR DEPRESSIVE DISORDER WITHOUT PRIOR EPISODE, UNSPECIFIED DEPRESSION EPISODE SEVERITY: ICD-10-CM

## 2020-08-19 DIAGNOSIS — K21.9 GASTROESOPHAGEAL REFLUX DISEASE, ESOPHAGITIS PRESENCE NOT SPECIFIED: ICD-10-CM

## 2020-08-19 DIAGNOSIS — K58.9 IRRITABLE BOWEL SYNDROME, UNSPECIFIED TYPE: ICD-10-CM

## 2020-08-19 DIAGNOSIS — G43.009 MIGRAINE WITHOUT AURA AND WITHOUT STATUS MIGRAINOSUS, NOT INTRACTABLE: ICD-10-CM

## 2020-08-19 DIAGNOSIS — F41.9 ANXIETY: ICD-10-CM

## 2020-08-19 RX ORDER — DICYCLOMINE HCL 20 MG
TABLET ORAL
Qty: 90 TABLET | Refills: 0 | Status: SHIPPED | OUTPATIENT
Start: 2020-08-19 | End: 2020-09-16

## 2020-08-19 RX ORDER — LORATADINE AND PSEUDOEPHEDRINE SULFATE 10; 240 MG/1; MG/1
TABLET, FILM COATED, EXTENDED RELEASE ORAL
Qty: 30 TABLET | Refills: 0 | Status: SHIPPED | OUTPATIENT
Start: 2020-08-19 | End: 2020-09-16

## 2020-08-19 RX ORDER — PROCHLORPERAZINE MALEATE 5 MG
TABLET ORAL
Qty: 90 TABLET | Refills: 0 | Status: SHIPPED | OUTPATIENT
Start: 2020-08-19 | End: 2020-09-16

## 2020-08-19 RX ORDER — SUMATRIPTAN 100 MG/1
TABLET, FILM COATED ORAL
Qty: 9 TABLET | Refills: 0 | Status: SHIPPED | OUTPATIENT
Start: 2020-08-19 | End: 2020-09-16

## 2020-08-19 RX ORDER — HYDROXYZINE PAMOATE 25 MG/1
CAPSULE ORAL
Qty: 60 CAPSULE | Refills: 0 | Status: SHIPPED | OUTPATIENT
Start: 2020-08-19 | End: 2020-09-16

## 2020-08-19 RX ORDER — PROPRANOLOL HYDROCHLORIDE 120 MG/1
CAPSULE, EXTENDED RELEASE ORAL
Qty: 30 CAPSULE | Refills: 0 | Status: SHIPPED | OUTPATIENT
Start: 2020-08-19 | End: 2020-09-16

## 2020-08-19 RX ORDER — PANTOPRAZOLE SODIUM 40 MG/1
TABLET, DELAYED RELEASE ORAL
Qty: 30 TABLET | Refills: 0 | Status: SHIPPED | OUTPATIENT
Start: 2020-08-19 | End: 2020-09-16

## 2020-08-19 NOTE — TELEPHONE ENCOUNTER
LOV 1/15/20    Allergy relief 7/15/20 #30  Dicyclomine 10/18/19 #90 with 9  Fluoxetine 7/15/20 #30  Hydroxyzine 7/15/20 #60  Pantoprazole 7/15/20 #30  prochlorperzine 7/15/19 #90  Propranolol 5/20/20 #30 with 2  imitrex 7/22/20 #9

## 2020-09-16 DIAGNOSIS — K58.9 IRRITABLE BOWEL SYNDROME, UNSPECIFIED TYPE: ICD-10-CM

## 2020-09-16 DIAGNOSIS — G47.00 INSOMNIA, UNSPECIFIED TYPE: ICD-10-CM

## 2020-09-16 DIAGNOSIS — R09.81 CHRONIC NASAL CONGESTION: ICD-10-CM

## 2020-09-16 DIAGNOSIS — F41.9 ANXIETY: ICD-10-CM

## 2020-09-16 DIAGNOSIS — F32.9 CURRENT EPISODE OF MAJOR DEPRESSIVE DISORDER WITHOUT PRIOR EPISODE, UNSPECIFIED DEPRESSION EPISODE SEVERITY: ICD-10-CM

## 2020-09-16 DIAGNOSIS — G43.009 MIGRAINE WITHOUT AURA AND WITHOUT STATUS MIGRAINOSUS, NOT INTRACTABLE: ICD-10-CM

## 2020-09-16 DIAGNOSIS — K21.9 GASTROESOPHAGEAL REFLUX DISEASE, ESOPHAGITIS PRESENCE NOT SPECIFIED: ICD-10-CM

## 2020-09-16 RX ORDER — PROPRANOLOL HYDROCHLORIDE 120 MG/1
CAPSULE, EXTENDED RELEASE ORAL
Qty: 30 CAPSULE | Refills: 0 | Status: SHIPPED | OUTPATIENT
Start: 2020-09-16 | End: 2020-10-15

## 2020-09-16 RX ORDER — SUMATRIPTAN 100 MG/1
TABLET, FILM COATED ORAL
Qty: 9 TABLET | Refills: 0 | Status: SHIPPED | OUTPATIENT
Start: 2020-09-16 | End: 2020-10-15

## 2020-09-16 RX ORDER — PROCHLORPERAZINE MALEATE 5 MG
TABLET ORAL
Qty: 90 TABLET | Refills: 0 | Status: SHIPPED | OUTPATIENT
Start: 2020-09-16 | End: 2020-10-15

## 2020-09-16 RX ORDER — LORATADINE AND PSEUDOEPHEDRINE SULFATE 10; 240 MG/1; MG/1
TABLET, FILM COATED, EXTENDED RELEASE ORAL
Qty: 30 TABLET | Refills: 0 | Status: SHIPPED | OUTPATIENT
Start: 2020-09-16 | End: 2020-10-15

## 2020-09-16 RX ORDER — PANTOPRAZOLE SODIUM 40 MG/1
TABLET, DELAYED RELEASE ORAL
Qty: 30 TABLET | Refills: 0 | Status: SHIPPED | OUTPATIENT
Start: 2020-09-16 | End: 2020-10-15

## 2020-09-16 RX ORDER — DICYCLOMINE HCL 20 MG
TABLET ORAL
Qty: 90 TABLET | Refills: 0 | Status: SHIPPED | OUTPATIENT
Start: 2020-09-16 | End: 2020-10-15

## 2020-09-16 RX ORDER — HYDROXYZINE PAMOATE 25 MG/1
CAPSULE ORAL
Qty: 60 CAPSULE | Refills: 0 | Status: SHIPPED | OUTPATIENT
Start: 2020-09-16 | End: 2020-10-15

## 2020-09-16 NOTE — TELEPHONE ENCOUNTER
Bentyl       Last Written Prescription Date:  8/19/2020  Last Fill Quantity: 90,   # refills: 0    Prozax      Last Written Prescription Date:  8/19/2020  Last Fill Quantity: 30,   # refills: 0    Vistaril       Last Written Prescription Date:  8/19/2020  Last Fill Quantity: 60,   # refills: 0    Protonix       Last Written Prescription Date:  8/19/2020  Last Fill Quantity: 30,   # refills: 0    Compazine      Last Written Prescription Date:  8/19/2020  Last Fill Quantity: 90,   # refills: 0    Inderal       Last Written Prescription Date:  8/19/2020  Last Fill Quantity: 30,   # refills: 0    Allergy Relief      Last Written Prescription Date:  8/19/2020  Last Fill Quantity: 30,   # refills: 0    Imitrex       Last Written Prescription Date:  8/19/2020  Last Fill Quantity: 9,   # refills: 0  Last Office Visit: 1/15/2020  Future Office visit:

## 2020-09-28 ENCOUNTER — ALLIED HEALTH/NURSE VISIT (OUTPATIENT)
Dept: ALLERGY | Facility: OTHER | Age: 35
End: 2020-09-28
Attending: FAMILY MEDICINE
Payer: COMMERCIAL

## 2020-09-28 DIAGNOSIS — Z30.42 ENCOUNTER FOR SURVEILLANCE OF INJECTABLE CONTRACEPTIVE: Primary | ICD-10-CM

## 2020-09-28 PROCEDURE — 96372 THER/PROPH/DIAG INJ SC/IM: CPT

## 2020-09-28 RX ADMIN — MEDROXYPROGESTERONE ACETATE 150 MG: 150 INJECTION, SUSPENSION INTRAMUSCULAR at 08:37

## 2020-09-28 NOTE — PROGRESS NOTES
Clinic Administered Medication Documentation      Depo Provera Documentation    URINE HCG: not indicated    Depo-Provera Standing Order inclusion/exclusion criteria reviewed.   Patient meets: inclusion criteria     BP: Data Unavailable  LAST PAP/EXAM:   Lab Results   Component Value Date    PAP NIL 09/04/2019       Prior to injection, verified patient identity using patient's name and date of birth. Medication was administered. Please see MAR and medication order for additional information.     Was entire vial of medication used? Yes  Vial/Syringe: Single dose vial  Expiration Date:  11/30/21    NEXT INJECTION DUE: 12/14/20 - 12/28/20      Right Glut    Carline Thomas LPN

## 2020-10-15 DIAGNOSIS — F32.9 CURRENT EPISODE OF MAJOR DEPRESSIVE DISORDER WITHOUT PRIOR EPISODE, UNSPECIFIED DEPRESSION EPISODE SEVERITY: ICD-10-CM

## 2020-10-15 DIAGNOSIS — F41.9 ANXIETY: ICD-10-CM

## 2020-10-15 DIAGNOSIS — G47.00 INSOMNIA, UNSPECIFIED TYPE: ICD-10-CM

## 2020-10-15 DIAGNOSIS — K58.9 IRRITABLE BOWEL SYNDROME, UNSPECIFIED TYPE: ICD-10-CM

## 2020-10-15 DIAGNOSIS — K21.9 GASTROESOPHAGEAL REFLUX DISEASE, UNSPECIFIED WHETHER ESOPHAGITIS PRESENT: Primary | ICD-10-CM

## 2020-10-15 DIAGNOSIS — G43.009 MIGRAINE WITHOUT AURA AND WITHOUT STATUS MIGRAINOSUS, NOT INTRACTABLE: ICD-10-CM

## 2020-10-15 DIAGNOSIS — R09.81 CHRONIC NASAL CONGESTION: ICD-10-CM

## 2020-10-15 RX ORDER — LORATADINE AND PSEUDOEPHEDRINE SULFATE 10; 240 MG/1; MG/1
TABLET, FILM COATED, EXTENDED RELEASE ORAL
Qty: 30 TABLET | Refills: 0 | Status: SHIPPED | OUTPATIENT
Start: 2020-10-15 | End: 2020-11-16

## 2020-10-15 RX ORDER — PANTOPRAZOLE SODIUM 40 MG/1
TABLET, DELAYED RELEASE ORAL
Qty: 30 TABLET | Refills: 0 | Status: SHIPPED | OUTPATIENT
Start: 2020-10-15 | End: 2020-11-16

## 2020-10-15 RX ORDER — HYDROXYZINE PAMOATE 25 MG/1
CAPSULE ORAL
Qty: 60 CAPSULE | Refills: 0 | Status: SHIPPED | OUTPATIENT
Start: 2020-10-15 | End: 2020-11-16

## 2020-10-15 RX ORDER — PROCHLORPERAZINE MALEATE 5 MG
TABLET ORAL
Qty: 90 TABLET | Refills: 0 | Status: SHIPPED | OUTPATIENT
Start: 2020-10-15 | End: 2020-11-16

## 2020-10-15 RX ORDER — SUMATRIPTAN 100 MG/1
TABLET, FILM COATED ORAL
Qty: 9 TABLET | Refills: 0 | Status: SHIPPED | OUTPATIENT
Start: 2020-10-15 | End: 2020-11-16

## 2020-10-15 RX ORDER — DICYCLOMINE HCL 20 MG
TABLET ORAL
Qty: 90 TABLET | Refills: 0 | Status: SHIPPED | OUTPATIENT
Start: 2020-10-15 | End: 2020-11-16

## 2020-10-15 RX ORDER — PROPRANOLOL HYDROCHLORIDE 120 MG/1
CAPSULE, EXTENDED RELEASE ORAL
Qty: 30 CAPSULE | Refills: 0 | Status: SHIPPED | OUTPATIENT
Start: 2020-10-15 | End: 2020-11-16

## 2020-10-15 NOTE — TELEPHONE ENCOUNTER
Bentyl       Last Written Prescription Date:  9/16/2020  Last Fill Quantity: 90,   # refills: 0    Prozac      Last Written Prescription Date:  9/16/2020  Last Fill Quantity: 30,   # refills: 0    Vistaril       Last Written Prescription Date:  9/16/2020  Last Fill Quantity: 60,   # refills: 0    Protonix       Last Written Prescription Date:  9/16/2020  Last Fill Quantity: 30,   # refills: 0    Compazine       Last Written Prescription Date:  9/16/2020  Last Fill Quantity: 90,   # refills: 0    Inderal       Last Written Prescription Date:  9/16/2020  Last Fill Quantity: 30,   # refills: 0    Allergy      Last Written Prescription Date:  9/16/2020  Last Fill Quantity: 30,   # refills: 0  Last Office Visit: 1/15/2020  Future Office visit:

## 2020-11-20 NOTE — PROGRESS NOTES
"Aubree Corcoran is a 35 year old female who is being evaluated via a billable telephone visit.      The patient has been notified of following:     \"This telephone visit will be conducted via a call between you and your physician/provider. We have found that certain health care needs can be provided without the need for a physical exam.  This service lets us provide the care you need with a short phone conversation.  If a prescription is necessary we can send it directly to your pharmacy.  If lab work is needed we can place an order for that and you can then stop by our lab to have the test done at a later time.    Telephone visits are billed at different rates depending on your insurance coverage. During this emergency period, for some insurers they may be billed the same as an in-person visit.  Please reach out to your insurance provider with any questions.    If during the course of the call the physician/provider feels a telephone visit is not appropriate, you will not be charged for this service.\"    Patient has given verbal consent for Telephone visit?  Yes    What phone number would you like to be contacted at? [phone]    How would you like to obtain your AVS? [employer]    Subjective     Aubree Corcoran is a 35 year old female who presents via phone visit today for the following health issues:    HPI      Due for flu vaccine -     Depression and Anxiety Follow-Up     How are you doing with your depression since your last visit? No change    How are you doing with your anxiety since your last visit?  No change    Are you having other symptoms that might be associated with depression or anxiety? No    Have you had a significant life event? No     Do you have any concerns with your use of alcohol or other drugs? No     Prozac 20 mg    Wants to increase dose    Low motivation    Working shift work    No exercise    Social History     Tobacco Use     Smoking status: Current Every Day Smoker     Packs/day: " 0.50     Years: 15.00     Pack years: 7.50     Types: Cigarettes     Smokeless tobacco: Never Used   Substance Use Topics     Alcohol use: No     Drug use: No     PHQ 9/6/2018 11/23/2020 1/7/2021   PHQ-9 Total Score 0 0 1   Q9: Thoughts of better off dead/self-harm past 2 weeks Not at all Not at all Not at all     JESSICA-7 SCORE 9/6/2018 11/23/2020 1/7/2021   Total Score 0 0 0     Last PHQ-9 1/7/2021   1.  Little interest or pleasure in doing things 0   2.  Feeling down, depressed, or hopeless 0   3.  Trouble falling or staying asleep, or sleeping too much 0   4.  Feeling tired or having little energy 1   5.  Poor appetite or overeating 0   6.  Feeling bad about yourself 0   7.  Trouble concentrating 0   8.  Moving slowly or restless 0   Q9: Thoughts of better off dead/self-harm past 2 weeks 0   PHQ-9 Total Score 1   Difficulty at work, home, or with people Somewhat difficult     JESSICA-7  1/7/2021   1. Feeling nervous, anxious, or on edge 0   2. Not being able to stop or control worrying 0   3. Worrying too much about different things 0   4. Trouble relaxing 0   5. Being so restless that it is hard to sit still 0   6. Becoming easily annoyed or irritable 0   7. Feeling afraid, as if something awful might happen 0   JESSICA-7 Total Score 0   If you checked any problems, how difficult have they made it for you to do your work, take care of things at home, or get along with other people? Not difficult at all       Suicide Assessment Five-step Evaluation and Treatment (SAFE-T)    Migraine     Since your last clinic visit, how have your headaches changed?  No change    How often are you getting headaches or migraines? A few times a week     Are you able to do normal daily activities when you have a migraine? Yes    Are you taking rescue/relief medications? (Select all that apply) Excedrin    How helpful is your rescue/relief medication?  I get total relief    Are you taking any medications to prevent migraines? (Select all that  apply)  No    In the past 4 weeks, how often have you gone to urgent care or the emergency room because of your headaches?  0     Inderal for prophylaxis    imitrex and excedrin for abortive; works well       GERD/Heartburn  Onset/Duration: chronic  Description: acid reflex/ heartburn  Intensity: mild  Progression of Symptoms: improving  Accompanying Signs & Symptoms:  Does it feel like food gets stuck or trouble swallowing: no  Nausea: no  Vomiting (bloody?): no  Abdominal Pain: no  Black-Tarry stools: no  Bloody stools: no  History:  Previous similar episodes: YES  Previous ulcers: no  Precipitating factors:   Caffeine use: YES- pop   Alcohol use: no  NSAID/Aspirin use: YES- ibuprofen  Tobacco use: YES- 1/2 pack daily   Worse with no particular food or drink.  Alleviating factors: None  Therapies tried and outcome:             Lifestyle changes: None            Medications: Protonix-effective     Insomnia  Onset/Duration: chronic   Description:   Frequency of insomnia:  Sleeping schedule is thrown off do to switching to night shift-trouble sleeping during the day though after working  Time to fall asleep (sleep latency): 2 hours  Middle of night awakening:  no  Early morning awakening:  no  Progression of Symptoms:  Worsening- on midnights now  Accompanying Signs & Symptoms:  Daytime sleepiness/napping: YES- on midnights   Excessive snoring/apnea: no  Restless legs: no  Waking to urinate: no  Chronic pain:  no  Depression symptoms (if yes, do PHQ9): no  Anxiety symptoms (if yes, do JESSICA-7): no  History:  Prior Insomnia: no  New stressful situation: no  Precipitating factors:   Caffeine intake: YES  OTC decongestants: YES- clariten D daily   Any new medications: no  Alleviating factors:  Self medicating (alcohol, etc.):  no  Stress-reduction (exercise, yoga, meditation etc): no  Therapies tried and outcome: none        Review of Systems   Constitutional, HEENT, cardiovascular, pulmonary, gi and gu systems are  negative, except as otherwise noted.       Objective      Vitals:  No vitals were obtained today due to virtual visit.    healthy, alert and no distress  PSYCH: Alert and oriented times 3; coherent speech, normal   rate and volume, able to articulate logical thoughts, able   to abstract reason, no tangential thoughts, no hallucinations   or delusions  Her affect is normal  RESP: No cough, no audible wheezing, able to talk in full sentences  Remainder of exam unable to be completed due to telephone visits            Assessment/Plan:    Assessment & Plan     Anxiety  Increased with stressors; would like to increase dose; increase to 40 mg prozac  - FLUoxetine (PROZAC) 40 MG capsule; Take 1 capsule (40 mg) by mouth daily    Current episode of major depressive disorder without prior episode, unspecified depression episode severity  As above  - FLUoxetine (PROZAC) 40 MG capsule; Take 1 capsule (40 mg) by mouth daily    Insomnia, unspecified type  Shift work related.  Work on exercise, bedtime routine.    Gastroesophageal reflux disease, unspecified whether esophagitis present  Stable with PPI.  Needs PRN night time Tums  - CBC with platelets and differential; Future  - pantoprazole (PROTONIX) 40 MG EC tablet; Take 1 tablet (40 mg) by mouth daily    Migraine without aura and without status migrainosus, not intractable  Stable.  - SUMAtriptan (IMITREX) 100 MG tablet; TAKE 1 TABLET BY MOUTH AT ONSET OF HEADACHE FOR MIGRAINE  - propranolol ER (INDERAL LA) 120 MG 24 hr capsule; Take 1 capsule (120 mg) by mouth daily    Fatigue, unspecified type  Multifactorial.  Due for labs.    - CBC with platelets and differential; Future  - Comprehensive metabolic panel (BMP + Alb, Alk Phos, ALT, AST, Total. Bili, TP); Future  - TSH with free T4 reflex; Future  - Vitamin D Deficiency; Future    Lipid screening  Baseline screening due.  - Lipid Profile (Chol, Trig, HDL, LDL calc); Future    Screening for diabetes mellitus  As above.  -  "Comprehensive metabolic panel (BMP + Alb, Alk Phos, ALT, AST, Total. Bili, TP); Future    Needs flu shot  Would like to get this.  Can schedule shot only when comes for labs.     Irritable bowel syndrome, unspecified type  Stable with Bentyl use.  - dicyclomine (BENTYL) 20 MG tablet; TAKE 1 TABLET BY MOUTH 3 TIMES A DAY     Tobacco Cessation:   reports that she has been smoking cigarettes. She has a 7.50 pack-year smoking history. She has never used smokeless tobacco.  Tobacco Cessation Action Plan: Information offered: Patient not interested at this time      BMI:   Estimated body mass index is 31.93 kg/m  as calculated from the following:    Height as of this encounter: 1.626 m (5' 4\").    Weight as of this encounter: 84.4 kg (186 lb).            Patient Instructions   To be scheduled for flu shot and fasting labs.  Increase Prozac to 40 mg daily.  Follow up 1-2 months, sooner if concerns.      Return in about 1 month (around 2/7/2021) for virtual 1 month for dep/anx; needs curbside flu shot ordered; needs fasting lab appointment asap.    Kendra Bailey MD  [facility] - [place]    Phone call duration:  11 minutes                "

## 2020-11-21 DIAGNOSIS — R09.81 CHRONIC NASAL CONGESTION: ICD-10-CM

## 2020-11-21 DIAGNOSIS — G43.009 MIGRAINE WITHOUT AURA AND WITHOUT STATUS MIGRAINOSUS, NOT INTRACTABLE: ICD-10-CM

## 2020-11-23 RX ORDER — LORATADINE AND PSEUDOEPHEDRINE SULFATE 10; 240 MG/1; MG/1
TABLET, FILM COATED, EXTENDED RELEASE ORAL
Qty: 15 TABLET | Refills: 0 | Status: SHIPPED | OUTPATIENT
Start: 2020-11-23 | End: 2021-01-14

## 2020-11-23 ASSESSMENT — ANXIETY QUESTIONNAIRES
2. NOT BEING ABLE TO STOP OR CONTROL WORRYING: NOT AT ALL
7. FEELING AFRAID AS IF SOMETHING AWFUL MIGHT HAPPEN: NOT AT ALL
GAD7 TOTAL SCORE: 0
3. WORRYING TOO MUCH ABOUT DIFFERENT THINGS: NOT AT ALL
5. BEING SO RESTLESS THAT IT IS HARD TO SIT STILL: NOT AT ALL
6. BECOMING EASILY ANNOYED OR IRRITABLE: NOT AT ALL
1. FEELING NERVOUS, ANXIOUS, OR ON EDGE: NOT AT ALL
4. TROUBLE RELAXING: NOT AT ALL

## 2020-11-23 ASSESSMENT — PAIN SCALES - GENERAL: PAINLEVEL: NO PAIN (0)

## 2020-11-23 ASSESSMENT — MIFFLIN-ST. JEOR: SCORE: 1523.69

## 2020-11-23 NOTE — TELEPHONE ENCOUNTER
Allergy relief      Last Written Prescription Date:  11/16/2020  Last Fill Quantity: 15,   # refills: 0  Last Office Visit: 1/15/2020  Future Office visit:

## 2020-11-23 NOTE — NURSING NOTE
"Chief Complaint   Patient presents with     Recheck Medication       Initial There were no vitals taken for this visit. Estimated body mass index is 31.93 kg/m  as calculated from the following:    Height as of 1/15/20: 1.626 m (5' 4\").    Weight as of 1/15/20: 84.4 kg (186 lb).  Medication Reconciliation: complete  Shari Jimenez LPN  "

## 2020-11-24 ASSESSMENT — ANXIETY QUESTIONNAIRES: GAD7 TOTAL SCORE: 0

## 2020-12-16 DIAGNOSIS — G47.00 INSOMNIA, UNSPECIFIED TYPE: ICD-10-CM

## 2020-12-16 DIAGNOSIS — F32.9 CURRENT EPISODE OF MAJOR DEPRESSIVE DISORDER WITHOUT PRIOR EPISODE, UNSPECIFIED DEPRESSION EPISODE SEVERITY: ICD-10-CM

## 2020-12-16 DIAGNOSIS — K21.9 GASTROESOPHAGEAL REFLUX DISEASE, UNSPECIFIED WHETHER ESOPHAGITIS PRESENT: ICD-10-CM

## 2020-12-16 DIAGNOSIS — F41.9 ANXIETY: ICD-10-CM

## 2020-12-16 DIAGNOSIS — G43.009 MIGRAINE WITHOUT AURA AND WITHOUT STATUS MIGRAINOSUS, NOT INTRACTABLE: ICD-10-CM

## 2020-12-16 RX ORDER — HYDROXYZINE PAMOATE 25 MG/1
CAPSULE ORAL
Qty: 60 CAPSULE | Refills: 0 | Status: SHIPPED | OUTPATIENT
Start: 2020-12-16 | End: 2021-01-07

## 2020-12-16 RX ORDER — PROPRANOLOL HYDROCHLORIDE 120 MG/1
CAPSULE, EXTENDED RELEASE ORAL
Qty: 30 CAPSULE | Refills: 0 | Status: SHIPPED | OUTPATIENT
Start: 2020-12-16 | End: 2021-01-07

## 2020-12-16 RX ORDER — PANTOPRAZOLE SODIUM 40 MG/1
TABLET, DELAYED RELEASE ORAL
Qty: 30 TABLET | Refills: 0 | Status: SHIPPED | OUTPATIENT
Start: 2020-12-16 | End: 2021-01-07

## 2020-12-16 RX ORDER — SUMATRIPTAN 100 MG/1
TABLET, FILM COATED ORAL
Qty: 9 TABLET | Refills: 0 | Status: SHIPPED | OUTPATIENT
Start: 2020-12-16 | End: 2021-01-07

## 2020-12-16 NOTE — TELEPHONE ENCOUNTER
Protonix      Last Written Prescription Date:  11/16/20  Last Fill Quantity: 30,   # refills: 0  Last Office Visit: 01/15/20  Future Office visit:   01/07/21      Imitrex      Last Written Prescription Date:  11/16/20  Last Fill Quantity: 9,   # refills: 0  Last Office Visit: 1/15/20  Future Office visit:  01/07/21      Inderal      Last Written Prescription Date:  11/16/20  Last Fill Quantity: 30,   # refills: 0  Last Office Visit: 01/15/20  Future Office visit:   01/07/21    Hydroxyzine      Last Written Prescription Date:  11/16/20  Last Fill Quantity: 60,   # refills: 0  Last Office Visit: 01/15/20  Future Office visit: 01/07/21        Prozac      Last Written Prescription Date:  11/16/20  Last Fill Quantity: 30,   # refills: 0  Last Office Visit: 01/15/20  Future Office visit:   01/07/21

## 2020-12-17 DIAGNOSIS — Z30.42 ENCOUNTER FOR SURVEILLANCE OF INJECTABLE CONTRACEPTIVE: Primary | ICD-10-CM

## 2020-12-17 RX ORDER — MEDROXYPROGESTERONE ACETATE 150 MG/ML
150 INJECTION, SUSPENSION INTRAMUSCULAR
Status: DISCONTINUED | OUTPATIENT
Start: 2020-12-22 | End: 2021-03-19

## 2020-12-17 NOTE — PROGRESS NOTES
Patient is on the upcoming schedule for a Depo-provera injection, but the order has .  Last office visit was 1/15/20.  Medication is pended if you approve.  Thank you.    Neema Marshall RN

## 2020-12-22 ENCOUNTER — ALLIED HEALTH/NURSE VISIT (OUTPATIENT)
Dept: ALLERGY | Facility: OTHER | Age: 35
End: 2020-12-22
Attending: FAMILY MEDICINE
Payer: COMMERCIAL

## 2020-12-22 DIAGNOSIS — Z30.42 ENCOUNTER FOR SURVEILLANCE OF INJECTABLE CONTRACEPTIVE: Primary | ICD-10-CM

## 2020-12-22 PROCEDURE — 96372 THER/PROPH/DIAG INJ SC/IM: CPT

## 2020-12-22 RX ADMIN — MEDROXYPROGESTERONE ACETATE 150 MG: 150 INJECTION, SUSPENSION INTRAMUSCULAR at 15:56

## 2020-12-22 NOTE — PROGRESS NOTES
Clinic Administered Medication Documentation      Depo Provera Documentation    URINE HCG: not indicated    Depo-Provera Standing Order inclusion/exclusion criteria reviewed.   Patient meets: inclusion criteria     BP: Data Unavailable  LAST PAP/EXAM:   Lab Results   Component Value Date    PAP NIL 09/04/2019       Prior to injection, verified patient identity using patient's name and date of birth. Medication was administered. Please see MAR and medication order for additional information.     Was entire vial of medication used? Yes  Vial/Syringe: Single dose vial  Expiration Date:  11/2021    Patient instructed to report any adverse reaction to staff immediately .  NEXT INJECTION DUE: 3/9/21 - 3/23/21    Given left upper, outer quadrant.    Neema Marshall, RN

## 2021-01-07 ENCOUNTER — VIRTUAL VISIT (OUTPATIENT)
Dept: FAMILY MEDICINE | Facility: OTHER | Age: 36
End: 2021-01-07
Attending: FAMILY MEDICINE
Payer: COMMERCIAL

## 2021-01-07 VITALS — BODY MASS INDEX: 31.76 KG/M2 | HEIGHT: 64 IN | WEIGHT: 186 LBS

## 2021-01-07 DIAGNOSIS — K21.9 GASTROESOPHAGEAL REFLUX DISEASE, UNSPECIFIED WHETHER ESOPHAGITIS PRESENT: ICD-10-CM

## 2021-01-07 DIAGNOSIS — G43.009 MIGRAINE WITHOUT AURA AND WITHOUT STATUS MIGRAINOSUS, NOT INTRACTABLE: ICD-10-CM

## 2021-01-07 DIAGNOSIS — Z13.220 LIPID SCREENING: ICD-10-CM

## 2021-01-07 DIAGNOSIS — Z23 NEEDS FLU SHOT: ICD-10-CM

## 2021-01-07 DIAGNOSIS — K58.9 IRRITABLE BOWEL SYNDROME, UNSPECIFIED TYPE: ICD-10-CM

## 2021-01-07 DIAGNOSIS — R53.83 FATIGUE, UNSPECIFIED TYPE: ICD-10-CM

## 2021-01-07 DIAGNOSIS — Z13.1 SCREENING FOR DIABETES MELLITUS: ICD-10-CM

## 2021-01-07 DIAGNOSIS — G47.00 INSOMNIA, UNSPECIFIED TYPE: ICD-10-CM

## 2021-01-07 DIAGNOSIS — F41.9 ANXIETY: Primary | ICD-10-CM

## 2021-01-07 DIAGNOSIS — F32.9 CURRENT EPISODE OF MAJOR DEPRESSIVE DISORDER WITHOUT PRIOR EPISODE, UNSPECIFIED DEPRESSION EPISODE SEVERITY: ICD-10-CM

## 2021-01-07 PROCEDURE — 99213 OFFICE O/P EST LOW 20 MIN: CPT | Mod: 95 | Performed by: FAMILY MEDICINE

## 2021-01-07 RX ORDER — PANTOPRAZOLE SODIUM 40 MG/1
40 TABLET, DELAYED RELEASE ORAL DAILY
Qty: 90 TABLET | Refills: 3 | Status: SHIPPED | OUTPATIENT
Start: 2021-01-07 | End: 2022-01-17

## 2021-01-07 RX ORDER — SUMATRIPTAN 100 MG/1
TABLET, FILM COATED ORAL
Qty: 9 TABLET | Refills: 1 | Status: SHIPPED | OUTPATIENT
Start: 2021-01-07 | End: 2021-02-15

## 2021-01-07 RX ORDER — FLUOXETINE 40 MG/1
40 CAPSULE ORAL DAILY
Qty: 90 CAPSULE | Refills: 0 | Status: SHIPPED | OUTPATIENT
Start: 2021-01-07 | End: 2021-06-07

## 2021-01-07 RX ORDER — PROPRANOLOL HYDROCHLORIDE 120 MG/1
120 CAPSULE, EXTENDED RELEASE ORAL DAILY
Qty: 90 CAPSULE | Refills: 3 | Status: SHIPPED | OUTPATIENT
Start: 2021-01-07 | End: 2021-03-08

## 2021-01-07 RX ORDER — DICYCLOMINE HCL 20 MG
TABLET ORAL
Qty: 180 TABLET | Refills: 1 | Status: SHIPPED | OUTPATIENT
Start: 2021-01-07 | End: 2021-06-07

## 2021-01-07 ASSESSMENT — ANXIETY QUESTIONNAIRES
4. TROUBLE RELAXING: NOT AT ALL
1. FEELING NERVOUS, ANXIOUS, OR ON EDGE: NOT AT ALL
6. BECOMING EASILY ANNOYED OR IRRITABLE: NOT AT ALL
7. FEELING AFRAID AS IF SOMETHING AWFUL MIGHT HAPPEN: NOT AT ALL
GAD7 TOTAL SCORE: 0
3. WORRYING TOO MUCH ABOUT DIFFERENT THINGS: NOT AT ALL
5. BEING SO RESTLESS THAT IT IS HARD TO SIT STILL: NOT AT ALL
IF YOU CHECKED OFF ANY PROBLEMS ON THIS QUESTIONNAIRE, HOW DIFFICULT HAVE THESE PROBLEMS MADE IT FOR YOU TO DO YOUR WORK, TAKE CARE OF THINGS AT HOME, OR GET ALONG WITH OTHER PEOPLE: NOT DIFFICULT AT ALL
2. NOT BEING ABLE TO STOP OR CONTROL WORRYING: NOT AT ALL

## 2021-01-07 NOTE — NURSING NOTE
"Chief Complaint   Patient presents with     Recheck Medication     Depression     Anxiety     Headache     Insomnia     Gastrophageal Reflux       Initial Ht 1.626 m (5' 4\")   Wt 84.4 kg (186 lb)   BMI 31.93 kg/m   Estimated body mass index is 31.93 kg/m  as calculated from the following:    Height as of this encounter: 1.626 m (5' 4\").    Weight as of this encounter: 84.4 kg (186 lb).  Medication Reconciliation: complete  Maday Kowalski LPN  "

## 2021-01-07 NOTE — PATIENT INSTRUCTIONS
To be scheduled for flu shot and fasting labs.  Increase Prozac to 40 mg daily.  Follow up 1-2 months, sooner if concerns.

## 2021-01-08 ASSESSMENT — PATIENT HEALTH QUESTIONNAIRE - PHQ9: SUM OF ALL RESPONSES TO PHQ QUESTIONS 1-9: 1

## 2021-01-14 DIAGNOSIS — R09.81 CHRONIC NASAL CONGESTION: ICD-10-CM

## 2021-01-14 DIAGNOSIS — G43.009 MIGRAINE WITHOUT AURA AND WITHOUT STATUS MIGRAINOSUS, NOT INTRACTABLE: ICD-10-CM

## 2021-01-14 RX ORDER — LORATADINE AND PSEUDOEPHEDRINE SULFATE 10; 240 MG/1; MG/1
TABLET, FILM COATED, EXTENDED RELEASE ORAL
Qty: 15 TABLET | Refills: 0 | Status: SHIPPED | OUTPATIENT
Start: 2021-01-14 | End: 2021-02-15

## 2021-01-14 NOTE — TELEPHONE ENCOUNTER
Allergy relief      Last Written Prescription Date:  11-  Last Fill Quantity: 15,   # refills: 0  Last Office Visit: 1-7-2021  Future Office visit:       Routing refill request to provider for review/approval because:

## 2021-02-27 ENCOUNTER — APPOINTMENT (OUTPATIENT)
Dept: ULTRASOUND IMAGING | Facility: HOSPITAL | Age: 36
End: 2021-02-27
Attending: EMERGENCY MEDICINE
Payer: COMMERCIAL

## 2021-02-27 ENCOUNTER — HOSPITAL ENCOUNTER (OUTPATIENT)
Facility: HOSPITAL | Age: 36
Discharge: HOME | End: 2021-02-28
Attending: EMERGENCY MEDICINE | Admitting: SURGERY
Payer: COMMERCIAL

## 2021-02-27 ENCOUNTER — ANESTHESIA EVENT (OUTPATIENT)
Dept: SURGERY | Facility: HOSPITAL | Age: 36
End: 2021-02-27
Payer: COMMERCIAL

## 2021-02-27 ENCOUNTER — ANESTHESIA (OUTPATIENT)
Dept: SURGERY | Facility: HOSPITAL | Age: 36
End: 2021-02-27
Payer: COMMERCIAL

## 2021-02-27 DIAGNOSIS — K81.0 ACUTE CHOLECYSTITIS: Primary | ICD-10-CM

## 2021-02-27 LAB
ALBUMIN SERPL-MCNC: 3.7 G/DL (ref 3.4–5)
ALBUMIN UR-MCNC: NEGATIVE MG/DL
ALP SERPL-CCNC: 77 U/L (ref 40–150)
ALT SERPL W P-5'-P-CCNC: 19 U/L (ref 0–50)
ANION GAP SERPL CALCULATED.3IONS-SCNC: 6 MMOL/L (ref 3–14)
APPEARANCE UR: CLEAR
AST SERPL W P-5'-P-CCNC: 8 U/L (ref 0–45)
B-HCG SERPL-ACNC: <1 IU/L (ref 0–5)
BACTERIA #/AREA URNS HPF: ABNORMAL /HPF
BASOPHILS # BLD AUTO: 0.1 10E9/L (ref 0–0.2)
BASOPHILS NFR BLD AUTO: 0.3 %
BILIRUB SERPL-MCNC: 0.5 MG/DL (ref 0.2–1.3)
BILIRUB UR QL STRIP: NEGATIVE
BUN SERPL-MCNC: 14 MG/DL (ref 7–30)
CALCIUM SERPL-MCNC: 8.9 MG/DL (ref 8.5–10.1)
CHLORIDE SERPL-SCNC: 109 MMOL/L (ref 94–109)
CO2 SERPL-SCNC: 24 MMOL/L (ref 20–32)
COLOR UR AUTO: ABNORMAL
CREAT SERPL-MCNC: 0.59 MG/DL (ref 0.52–1.04)
DIFFERENTIAL METHOD BLD: ABNORMAL
EOSINOPHIL # BLD AUTO: 0.1 10E9/L (ref 0–0.7)
EOSINOPHIL NFR BLD AUTO: 0.8 %
ERYTHROCYTE [DISTWIDTH] IN BLOOD BY AUTOMATED COUNT: 13.8 % (ref 10–15)
GFR SERPL CREATININE-BSD FRML MDRD: >90 ML/MIN/{1.73_M2}
GLUCOSE SERPL-MCNC: 112 MG/DL (ref 70–99)
GLUCOSE UR STRIP-MCNC: NEGATIVE MG/DL
HCT VFR BLD AUTO: 43.5 % (ref 35–47)
HGB BLD-MCNC: 14.7 G/DL (ref 11.7–15.7)
HGB UR QL STRIP: ABNORMAL
IMM GRANULOCYTES # BLD: 0.1 10E9/L (ref 0–0.4)
IMM GRANULOCYTES NFR BLD: 0.4 %
KETONES UR STRIP-MCNC: NEGATIVE MG/DL
LABORATORY COMMENT REPORT: NORMAL
LEUKOCYTE ESTERASE UR QL STRIP: NEGATIVE
LIPASE SERPL-CCNC: 66 U/L (ref 73–393)
LYMPHOCYTES # BLD AUTO: 2.9 10E9/L (ref 0.8–5.3)
LYMPHOCYTES NFR BLD AUTO: 16.7 %
MCH RBC QN AUTO: 30.8 PG (ref 26.5–33)
MCHC RBC AUTO-ENTMCNC: 33.8 G/DL (ref 31.5–36.5)
MCV RBC AUTO: 91 FL (ref 78–100)
MONOCYTES # BLD AUTO: 0.9 10E9/L (ref 0–1.3)
MONOCYTES NFR BLD AUTO: 5.1 %
MUCOUS THREADS #/AREA URNS LPF: PRESENT /LPF
NEUTROPHILS # BLD AUTO: 13.2 10E9/L (ref 1.6–8.3)
NEUTROPHILS NFR BLD AUTO: 76.7 %
NITRATE UR QL: NEGATIVE
NRBC # BLD AUTO: 0 10*3/UL
NRBC BLD AUTO-RTO: 0 /100
PH UR STRIP: 6.5 PH (ref 4.7–8)
PLATELET # BLD AUTO: 319 10E9/L (ref 150–450)
POTASSIUM SERPL-SCNC: 3.5 MMOL/L (ref 3.4–5.3)
PROT SERPL-MCNC: 7.1 G/DL (ref 6.8–8.8)
RBC # BLD AUTO: 4.77 10E12/L (ref 3.8–5.2)
RBC #/AREA URNS AUTO: 4 /HPF (ref 0–2)
SARS-COV-2 RNA RESP QL NAA+PROBE: NEGATIVE
SODIUM SERPL-SCNC: 139 MMOL/L (ref 133–144)
SOURCE: ABNORMAL
SP GR UR STRIP: 1.02 (ref 1–1.03)
SPECIMEN SOURCE: NORMAL
SQUAMOUS #/AREA URNS AUTO: 0 /HPF (ref 0–1)
UROBILINOGEN UR STRIP-MCNC: NORMAL MG/DL (ref 0–2)
WBC # BLD AUTO: 17.2 10E9/L (ref 4–11)
WBC #/AREA URNS AUTO: <1 /HPF (ref 0–5)

## 2021-02-27 PROCEDURE — 370N000017 HC ANESTHESIA TECHNICAL FEE, PER MIN: Performed by: SURGERY

## 2021-02-27 PROCEDURE — 80053 COMPREHEN METABOLIC PANEL: CPT | Performed by: EMERGENCY MEDICINE

## 2021-02-27 PROCEDURE — 96376 TX/PRO/DX INJ SAME DRUG ADON: CPT | Mod: XE

## 2021-02-27 PROCEDURE — 81001 URINALYSIS AUTO W/SCOPE: CPT | Performed by: EMERGENCY MEDICINE

## 2021-02-27 PROCEDURE — 76705 ECHO EXAM OF ABDOMEN: CPT

## 2021-02-27 PROCEDURE — 47562 LAPAROSCOPIC CHOLECYSTECTOMY: CPT | Performed by: SURGERY

## 2021-02-27 PROCEDURE — 250N000011 HC RX IP 250 OP 636: Performed by: NURSE ANESTHETIST, CERTIFIED REGISTERED

## 2021-02-27 PROCEDURE — 96375 TX/PRO/DX INJ NEW DRUG ADDON: CPT | Mod: XE

## 2021-02-27 PROCEDURE — 250N000011 HC RX IP 250 OP 636: Performed by: EMERGENCY MEDICINE

## 2021-02-27 PROCEDURE — 99285 EMERGENCY DEPT VISIT HI MDM: CPT | Mod: 57 | Performed by: SURGERY

## 2021-02-27 PROCEDURE — 258N000003 HC RX IP 258 OP 636: Performed by: EMERGENCY MEDICINE

## 2021-02-27 PROCEDURE — 99285 EMERGENCY DEPT VISIT HI MDM: CPT | Mod: 25

## 2021-02-27 PROCEDURE — 47562 LAPAROSCOPIC CHOLECYSTECTOMY: CPT | Performed by: NURSE ANESTHETIST, CERTIFIED REGISTERED

## 2021-02-27 PROCEDURE — U0003 INFECTIOUS AGENT DETECTION BY NUCLEIC ACID (DNA OR RNA); SEVERE ACUTE RESPIRATORY SYNDROME CORONAVIRUS 2 (SARS-COV-2) (CORONAVIRUS DISEASE [COVID-19]), AMPLIFIED PROBE TECHNIQUE, MAKING USE OF HIGH THROUGHPUT TECHNOLOGIES AS DESCRIBED BY CMS-2020-01-R: HCPCS | Performed by: EMERGENCY MEDICINE

## 2021-02-27 PROCEDURE — 250N000009 HC RX 250: Performed by: SURGERY

## 2021-02-27 PROCEDURE — 250N000025 HC SEVOFLURANE, PER MIN: Performed by: SURGERY

## 2021-02-27 PROCEDURE — 85025 COMPLETE CBC W/AUTO DIFF WBC: CPT | Performed by: EMERGENCY MEDICINE

## 2021-02-27 PROCEDURE — U0005 INFEC AGEN DETEC AMPLI PROBE: HCPCS | Performed by: EMERGENCY MEDICINE

## 2021-02-27 PROCEDURE — 83690 ASSAY OF LIPASE: CPT | Performed by: EMERGENCY MEDICINE

## 2021-02-27 PROCEDURE — 999N000157 HC STATISTIC RCP TIME EA 10 MIN

## 2021-02-27 PROCEDURE — 360N000076 HC SURGERY LEVEL 3, PER MIN: Performed by: SURGERY

## 2021-02-27 PROCEDURE — 96374 THER/PROPH/DIAG INJ IV PUSH: CPT | Mod: XE

## 2021-02-27 PROCEDURE — 272N000001 HC OR GENERAL SUPPLY STERILE: Performed by: SURGERY

## 2021-02-27 PROCEDURE — C9803 HOPD COVID-19 SPEC COLLECT: HCPCS

## 2021-02-27 PROCEDURE — 88304 TISSUE EXAM BY PATHOLOGIST: CPT | Mod: TC | Performed by: SURGERY

## 2021-02-27 PROCEDURE — 36415 COLL VENOUS BLD VENIPUNCTURE: CPT

## 2021-02-27 PROCEDURE — 250N000009 HC RX 250: Performed by: NURSE ANESTHETIST, CERTIFIED REGISTERED

## 2021-02-27 PROCEDURE — 710N000010 HC RECOVERY PHASE 1, LEVEL 2, PER MIN: Performed by: SURGERY

## 2021-02-27 PROCEDURE — 99285 EMERGENCY DEPT VISIT HI MDM: CPT | Performed by: EMERGENCY MEDICINE

## 2021-02-27 PROCEDURE — 84702 CHORIONIC GONADOTROPIN TEST: CPT | Performed by: EMERGENCY MEDICINE

## 2021-02-27 PROCEDURE — 258N000003 HC RX IP 258 OP 636: Performed by: NURSE ANESTHETIST, CERTIFIED REGISTERED

## 2021-02-27 RX ORDER — NALOXONE HYDROCHLORIDE 0.4 MG/ML
0.4 INJECTION, SOLUTION INTRAMUSCULAR; INTRAVENOUS; SUBCUTANEOUS
Status: DISCONTINUED | OUTPATIENT
Start: 2021-02-27 | End: 2021-02-27 | Stop reason: HOSPADM

## 2021-02-27 RX ORDER — LIDOCAINE HYDROCHLORIDE 20 MG/ML
INJECTION, SOLUTION INFILTRATION; PERINEURAL PRN
Status: DISCONTINUED | OUTPATIENT
Start: 2021-02-27 | End: 2021-02-27

## 2021-02-27 RX ORDER — IBUPROFEN 600 MG/1
600 TABLET, FILM COATED ORAL EVERY 6 HOURS PRN
Qty: 30 TABLET | Refills: 0
Start: 2021-02-27 | End: 2022-01-26

## 2021-02-27 RX ORDER — ONDANSETRON 2 MG/ML
4 INJECTION INTRAMUSCULAR; INTRAVENOUS ONCE
Status: COMPLETED | OUTPATIENT
Start: 2021-02-27 | End: 2021-02-27

## 2021-02-27 RX ORDER — ONDANSETRON 2 MG/ML
4 INJECTION INTRAMUSCULAR; INTRAVENOUS EVERY 30 MIN PRN
Status: DISCONTINUED | OUTPATIENT
Start: 2021-02-27 | End: 2021-02-27

## 2021-02-27 RX ORDER — NALOXONE HYDROCHLORIDE 0.4 MG/ML
0.2 INJECTION, SOLUTION INTRAMUSCULAR; INTRAVENOUS; SUBCUTANEOUS
Status: DISCONTINUED | OUTPATIENT
Start: 2021-02-27 | End: 2021-02-27 | Stop reason: HOSPADM

## 2021-02-27 RX ORDER — LABETALOL 20 MG/4 ML (5 MG/ML) INTRAVENOUS SYRINGE
10
Status: DISCONTINUED | OUTPATIENT
Start: 2021-02-27 | End: 2021-02-27 | Stop reason: HOSPADM

## 2021-02-27 RX ORDER — KETOROLAC TROMETHAMINE 30 MG/ML
INJECTION, SOLUTION INTRAMUSCULAR; INTRAVENOUS PRN
Status: DISCONTINUED | OUTPATIENT
Start: 2021-02-27 | End: 2021-02-27

## 2021-02-27 RX ORDER — OXYCODONE AND ACETAMINOPHEN 5; 325 MG/1; MG/1
1-2 TABLET ORAL EVERY 6 HOURS PRN
Qty: 12 TABLET | Refills: 0 | Status: SHIPPED | OUTPATIENT
Start: 2021-02-27 | End: 2021-02-28

## 2021-02-27 RX ORDER — FENTANYL CITRATE 50 UG/ML
25-50 INJECTION, SOLUTION INTRAMUSCULAR; INTRAVENOUS
Status: DISCONTINUED | OUTPATIENT
Start: 2021-02-27 | End: 2021-02-27 | Stop reason: HOSPADM

## 2021-02-27 RX ORDER — ONDANSETRON 4 MG/1
4 TABLET, ORALLY DISINTEGRATING ORAL EVERY 6 HOURS PRN
Status: DISCONTINUED | OUTPATIENT
Start: 2021-02-27 | End: 2021-02-28 | Stop reason: HOSPADM

## 2021-02-27 RX ORDER — MORPHINE SULFATE 4 MG/ML
4 INJECTION, SOLUTION INTRAMUSCULAR; INTRAVENOUS ONCE
Status: COMPLETED | OUTPATIENT
Start: 2021-02-27 | End: 2021-02-27

## 2021-02-27 RX ORDER — IBUPROFEN 600 MG/1
600 TABLET, FILM COATED ORAL EVERY 6 HOURS PRN
Status: DISCONTINUED | OUTPATIENT
Start: 2021-02-27 | End: 2021-02-28 | Stop reason: HOSPADM

## 2021-02-27 RX ORDER — MEPERIDINE HYDROCHLORIDE 25 MG/ML
12.5 INJECTION INTRAMUSCULAR; INTRAVENOUS; SUBCUTANEOUS
Status: DISCONTINUED | OUTPATIENT
Start: 2021-02-27 | End: 2021-02-27 | Stop reason: HOSPADM

## 2021-02-27 RX ORDER — PROCHLORPERAZINE MALEATE 5 MG
10 TABLET ORAL EVERY 6 HOURS PRN
Status: DISCONTINUED | OUTPATIENT
Start: 2021-02-27 | End: 2021-02-28 | Stop reason: HOSPADM

## 2021-02-27 RX ORDER — BUPIVACAINE HYDROCHLORIDE AND EPINEPHRINE 2.5; 5 MG/ML; UG/ML
INJECTION, SOLUTION EPIDURAL; INFILTRATION; INTRACAUDAL; PERINEURAL PRN
Status: DISCONTINUED | OUTPATIENT
Start: 2021-02-27 | End: 2021-02-27 | Stop reason: HOSPADM

## 2021-02-27 RX ORDER — ACETAMINOPHEN 325 MG/1
650 TABLET ORAL EVERY 4 HOURS PRN
Qty: 100 TABLET | Refills: 0
Start: 2021-02-27 | End: 2022-01-26

## 2021-02-27 RX ORDER — HYDROMORPHONE HYDROCHLORIDE 1 MG/ML
.3-.5 INJECTION, SOLUTION INTRAMUSCULAR; INTRAVENOUS; SUBCUTANEOUS EVERY 10 MIN PRN
Status: DISCONTINUED | OUTPATIENT
Start: 2021-02-27 | End: 2021-02-27 | Stop reason: HOSPADM

## 2021-02-27 RX ORDER — ONDANSETRON 2 MG/ML
4 INJECTION INTRAMUSCULAR; INTRAVENOUS EVERY 6 HOURS PRN
Status: DISCONTINUED | OUTPATIENT
Start: 2021-02-27 | End: 2021-02-28 | Stop reason: HOSPADM

## 2021-02-27 RX ORDER — FENTANYL CITRATE-0.9 % NACL/PF 10 MCG/ML
PLASTIC BAG, INJECTION (ML) INTRAVENOUS PRN
Status: DISCONTINUED | OUTPATIENT
Start: 2021-02-27 | End: 2021-02-27

## 2021-02-27 RX ORDER — ALBUTEROL SULFATE 0.83 MG/ML
2.5 SOLUTION RESPIRATORY (INHALATION) EVERY 4 HOURS PRN
Status: DISCONTINUED | OUTPATIENT
Start: 2021-02-27 | End: 2021-02-27 | Stop reason: HOSPADM

## 2021-02-27 RX ORDER — LIDOCAINE 40 MG/G
CREAM TOPICAL
Status: DISCONTINUED | OUTPATIENT
Start: 2021-02-27 | End: 2021-02-28 | Stop reason: HOSPADM

## 2021-02-27 RX ORDER — SODIUM CHLORIDE, SODIUM LACTATE, POTASSIUM CHLORIDE, CALCIUM CHLORIDE 600; 310; 30; 20 MG/100ML; MG/100ML; MG/100ML; MG/100ML
INJECTION, SOLUTION INTRAVENOUS CONTINUOUS
Status: DISCONTINUED | OUTPATIENT
Start: 2021-02-27 | End: 2021-02-27 | Stop reason: HOSPADM

## 2021-02-27 RX ORDER — ONDANSETRON 4 MG/1
4 TABLET, ORALLY DISINTEGRATING ORAL EVERY 30 MIN PRN
Status: DISCONTINUED | OUTPATIENT
Start: 2021-02-27 | End: 2021-02-27

## 2021-02-27 RX ORDER — ONDANSETRON 2 MG/ML
INJECTION INTRAMUSCULAR; INTRAVENOUS PRN
Status: DISCONTINUED | OUTPATIENT
Start: 2021-02-27 | End: 2021-02-27

## 2021-02-27 RX ORDER — DEXAMETHASONE SODIUM PHOSPHATE 10 MG/ML
INJECTION, SOLUTION INTRAMUSCULAR; INTRAVENOUS PRN
Status: DISCONTINUED | OUTPATIENT
Start: 2021-02-27 | End: 2021-02-27

## 2021-02-27 RX ORDER — PROPOFOL 10 MG/ML
INJECTION, EMULSION INTRAVENOUS PRN
Status: DISCONTINUED | OUTPATIENT
Start: 2021-02-27 | End: 2021-02-27

## 2021-02-27 RX ORDER — FENTANYL CITRATE 50 UG/ML
25-50 INJECTION, SOLUTION INTRAMUSCULAR; INTRAVENOUS
Status: CANCELLED | OUTPATIENT
Start: 2021-02-27

## 2021-02-27 RX ORDER — FENTANYL CITRATE 50 UG/ML
INJECTION, SOLUTION INTRAMUSCULAR; INTRAVENOUS PRN
Status: DISCONTINUED | OUTPATIENT
Start: 2021-02-27 | End: 2021-02-27

## 2021-02-27 RX ORDER — SODIUM CHLORIDE, SODIUM LACTATE, POTASSIUM CHLORIDE, CALCIUM CHLORIDE 600; 310; 30; 20 MG/100ML; MG/100ML; MG/100ML; MG/100ML
INJECTION, SOLUTION INTRAVENOUS CONTINUOUS PRN
Status: DISCONTINUED | OUTPATIENT
Start: 2021-02-27 | End: 2021-02-27

## 2021-02-27 RX ADMIN — SODIUM CHLORIDE, POTASSIUM CHLORIDE, SODIUM LACTATE AND CALCIUM CHLORIDE: 600; 310; 30; 20 INJECTION, SOLUTION INTRAVENOUS at 18:28

## 2021-02-27 RX ADMIN — ONDANSETRON 4 MG: 2 INJECTION INTRAMUSCULAR; INTRAVENOUS at 19:46

## 2021-02-27 RX ADMIN — FENTANYL CITRATE 50 MCG: 50 INJECTION, SOLUTION INTRAMUSCULAR; INTRAVENOUS at 19:44

## 2021-02-27 RX ADMIN — TAZOBACTAM SODIUM AND PIPERACILLIN SODIUM 3.38 G: 375; 3 INJECTION, SOLUTION INTRAVENOUS at 17:20

## 2021-02-27 RX ADMIN — KETOROLAC TROMETHAMINE 30 MG: 30 INJECTION, SOLUTION INTRAMUSCULAR at 19:46

## 2021-02-27 RX ADMIN — LIDOCAINE HYDROCHLORIDE 40 MG: 20 INJECTION, SOLUTION INFILTRATION; PERINEURAL at 18:36

## 2021-02-27 RX ADMIN — ROCURONIUM BROMIDE 5 MG: 10 INJECTION INTRAVENOUS at 18:36

## 2021-02-27 RX ADMIN — DEXAMETHASONE SODIUM PHOSPHATE 10 MG: 10 INJECTION, SOLUTION INTRAMUSCULAR; INTRAVENOUS at 18:43

## 2021-02-27 RX ADMIN — SODIUM CHLORIDE 1000 ML: 9 INJECTION, SOLUTION INTRAVENOUS at 14:44

## 2021-02-27 RX ADMIN — Medication 50 MCG: at 19:02

## 2021-02-27 RX ADMIN — Medication 100 MG: at 18:36

## 2021-02-27 RX ADMIN — ONDANSETRON 4 MG: 2 INJECTION INTRAMUSCULAR; INTRAVENOUS at 16:34

## 2021-02-27 RX ADMIN — MORPHINE SULFATE 4 MG: 4 INJECTION, SOLUTION INTRAMUSCULAR; INTRAVENOUS at 16:23

## 2021-02-27 RX ADMIN — ONDANSETRON 4 MG: 2 INJECTION INTRAMUSCULAR; INTRAVENOUS at 14:44

## 2021-02-27 RX ADMIN — FENTANYL CITRATE 50 MCG: 50 INJECTION, SOLUTION INTRAMUSCULAR; INTRAVENOUS at 18:51

## 2021-02-27 RX ADMIN — ROCURONIUM BROMIDE 30 MG: 10 INJECTION INTRAVENOUS at 18:42

## 2021-02-27 RX ADMIN — FENTANYL CITRATE 50 MCG: 50 INJECTION, SOLUTION INTRAMUSCULAR; INTRAVENOUS at 19:58

## 2021-02-27 RX ADMIN — PROPOFOL 200 MG: 10 INJECTION, EMULSION INTRAVENOUS at 18:36

## 2021-02-27 RX ADMIN — FENTANYL CITRATE 100 MCG: 50 INJECTION, SOLUTION INTRAMUSCULAR; INTRAVENOUS at 18:36

## 2021-02-27 RX ADMIN — FENTANYL CITRATE 50 MCG: 50 INJECTION, SOLUTION INTRAMUSCULAR; INTRAVENOUS at 19:26

## 2021-02-27 RX ADMIN — MORPHINE SULFATE 4 MG: 4 INJECTION, SOLUTION INTRAMUSCULAR; INTRAVENOUS at 14:44

## 2021-02-27 ASSESSMENT — LIFESTYLE VARIABLES: TOBACCO_USE: 1

## 2021-02-27 ASSESSMENT — MIFFLIN-ST. JEOR: SCORE: 1587

## 2021-02-27 NOTE — CONSULTS
Emergency Room Consult  2/27/2021    Patient : Aubree Corcoran    Admitting Physician : Delmar French DO    Reason for Admission : Acute Cholecystitis    Length of stay is not expected to be greater than two nights.    This is a 35 year old female who presented to the hospital with Acute Cholecystitis.     Date of onset : 2/27/2021   Fatty food intolerance : YES   Time after eating to initiation of symptoms  : 30min   Duration of attacks : continuous   History of jaundice : NO   History of Pancreatitis : NO      Ultrasound shows cholelithiasis : YES    Evidence of acute cholecystitis :  YES    Evidence of gangrenous cholecystitis:  NO    Evidence of choledocholithiasis : NO    Past Medical History:  Past Medical History:   Diagnosis Date     Family history of heart disease in female family member before age 65 4/7/2017     Family history of heart disease in male family member before age 55 4/7/2017     Gastroesophageal reflux disease, esophagitis presence not specified 4/7/2017     Irritable bowel syndrome with both constipation and diarrhea 4/7/2017     Migraine without aura and without status migrainosus, not intractable 04/07/2017    Dr Cade, neurology     Tobacco use disorder        Past Surgical History:  Past Surgical History:   Procedure Laterality Date     ORTHOPEDIC SURGERY  2002    ankle surgery- bone spur removal       Family History History:  Family History   Problem Relation Age of Onset     Heart Disease Mother         8 stents; onset 40s?     Chronic Obstructive Pulmonary Disease Mother      Cancer Father         mouth and throat cancer     Heart Disease Brother         stents; age 37; occlusive disease     Bladder Cancer Brother        History of Tobacco Use:  History   Smoking Status     Current Every Day Smoker     Packs/day: 0.50     Years: 15.00     Types: Cigarettes   Smokeless Tobacco     Never Used       Current Medications:  Current Outpatient Medications   Medication Sig Dispense  Refill     dicyclomine (BENTYL) 20 MG tablet TAKE 1 TABLET BY MOUTH 3 TIMES A DAY (Patient taking differently: daily ) 180 tablet 1     FLUoxetine (PROZAC) 40 MG capsule Take 1 capsule (40 mg) by mouth daily 90 capsule 0     pantoprazole (PROTONIX) 40 MG EC tablet Take 1 tablet (40 mg) by mouth daily 90 tablet 3     propranolol ER (INDERAL LA) 120 MG 24 hr capsule Take 1 capsule (120 mg) by mouth daily 90 capsule 3     SM ALLERGY RELIEF D  MG 24 hr tablet TAKE 1 TABLET BY MOUTH DAILY 15 tablet 3     SUMAtriptan (IMITREX) 100 MG tablet TAKE 1 TABLET BY MOUTH AT ONSET OF HEADACHE FOR MIGRAINE 9 tablet 3     medroxyPROGESTERone (DEPO-PROVERA) 150 MG/ML injection Inject 1 mL (150 mg) into the muscle every 3 months 0.9 mL 3     prochlorperazine (COMPAZINE) 5 MG tablet TAKE 1 TO 2 TABLETS BY MOUTH EVERY 8 HOURS AS NEEDED FOR NAUSEA 90 tablet 0       Allergies:  Allergies   Allergen Reactions     Linaclotide      diarrhea       ROS:  Pertinent items are noted in HPI.  All other systems are negative.    PHYSICAL EXAM:     Vital signs: /93   Pulse 77   Temp 97.6  F (36.4  C) (Tympanic)   Resp 16   SpO2 95%    Weight: [unfilled]   BMI: There is no height or weight on file to calculate BMI.   General: Normal, healthy, cooperative   Skin: no rashes, no ecchymoses   HEENT: PERRLA, EOMI and Sclera clear, anicteric   Neck: supple, without adenopathy   Lungs: clear to auscultation, without wheezes   CV: Regular rate and rhythm without murmer   Abdominal: abdomen is soft, non distended, tender to palpation ruq with positive vega sign   Extremities: No cyanosis, clubbing or edema noted bilaterally in Upper and Lower Extremities   Neurological: without deficit, cranial nerves intact    CBC RESULTS:   Recent Labs   Lab Test 02/27/21  1435   WBC 17.2*   RBC 4.77   HGB 14.7   HCT 43.5   MCV 91   MCH 30.8   MCHC 33.8   RDW 13.8          Last Comprehensive Metabolic Panel:  Sodium   Date Value Ref Range Status    02/27/2021 139 133 - 144 mmol/L Final     Potassium   Date Value Ref Range Status   02/27/2021 3.5 3.4 - 5.3 mmol/L Final     Chloride   Date Value Ref Range Status   02/27/2021 109 94 - 109 mmol/L Final     Carbon Dioxide   Date Value Ref Range Status   02/27/2021 24 20 - 32 mmol/L Final     Anion Gap   Date Value Ref Range Status   02/27/2021 6 3 - 14 mmol/L Final     Glucose   Date Value Ref Range Status   02/27/2021 112 (H) 70 - 99 mg/dL Final     Urea Nitrogen   Date Value Ref Range Status   02/27/2021 14 7 - 30 mg/dL Final     Creatinine   Date Value Ref Range Status   02/27/2021 0.59 0.52 - 1.04 mg/dL Final     GFR Estimate   Date Value Ref Range Status   02/27/2021 >90 >60 mL/min/[1.73_m2] Final     Comment:     Non  GFR Calc  Starting 12/18/2018, serum creatinine based estimated GFR (eGFR) will be   calculated using the Chronic Kidney Disease Epidemiology Collaboration   (CKD-EPI) equation.       Calcium   Date Value Ref Range Status   02/27/2021 8.9 8.5 - 10.1 mg/dL Final     Bilirubin Total   Date Value Ref Range Status   02/27/2021 0.5 0.2 - 1.3 mg/dL Final     Alkaline Phosphatase   Date Value Ref Range Status   02/27/2021 77 40 - 150 U/L Final     ALT   Date Value Ref Range Status   02/27/2021 19 0 - 50 U/L Final     AST   Date Value Ref Range Status   02/27/2021 8 0 - 45 U/L Final       ASSESSMENT:    35 year old female with Acute Cholecystitis.        PLAN:   Admit to the hospital.     WILL NOT have hospitalist visit with the patient and assess for surgical risks.     WILL start the patient antibiotics.     Will plan on a laparoscopic possible open cholecystectomy.      The plan and the procedure were explained with its risk, benefits and alternatives.  Risks include but are not limited to conversion to an open procedure, wound infections, development of an incisional hernia, damager to internal organs, damage to the common bile duct, need for additional procedures.  All of the  patients questions were answered.  Patient consents to proceed.  The procedure will be scheduled.

## 2021-02-27 NOTE — ED TRIAGE NOTES
Woke up at 2000 yesterday with abdominal pain. Had improved by 0000. Around 0300 had a  Couple of donuts. Woke at 0600 with severe abdominal pain 9-10. Has not improved. Pain from Umbilical area to epigastric area. Patient is nauseous and has been having dry heaves approx every 15 minutes when awake

## 2021-02-27 NOTE — ED PROVIDER NOTES
History     Chief Complaint   Patient presents with     Abdominal Pain     HPI  Aubree Corcoran is a 35 year old female who presents with abdominal pain.  She reports pain in her upper abdomen which started last evening.  She notes that it was improving overnight, then she ate something around 4 AM and her pain recurred immediately and has been severe and constant since that time.  She reports some nausea, dry heaving all day.  She has not eaten anything since 4:00 this morning.  She denies any fevers.  She denies any diarrhea.  She denies any dysuria or hematuria.  Denies any abnormal vaginal bleeding or discharge.  No history of similar symptoms.  No previous abdominal surgeries.    Allergies:  Allergies   Allergen Reactions     Linaclotide      diarrhea       Problem List:    Patient Active Problem List    Diagnosis Date Noted     Single current episode of major depressive disorder, unspecified depression episode severity 09/06/2018     Priority: Medium     Anxiety 09/06/2018     Priority: Medium     Insomnia, unspecified type 09/06/2018     Priority: Medium     ACP (advance care planning) 04/07/2017     Priority: Medium     Advance Care Planning 4/7/2017: ACP Review of Chart / Resources Provided:  Reviewed chart for advance care plan.  Aubree Corcoran has no plan or code status on file. Discussed available resources and provided with information.   Added by Jane Osullivan             Tobacco use disorder 04/07/2017     Priority: Medium     Migraine without aura and without status migrainosus, not intractable 04/07/2017     Priority: Medium     Gastroesophageal reflux disease, esophagitis presence not specified 04/07/2017     Priority: Medium     IMO Regulatory Load OCT 2020       Irritable bowel syndrome with both constipation and diarrhea 04/07/2017     Priority: Medium     Family history of heart disease in male family member before age 55 04/07/2017     Priority: Medium        Past Medical History:     Past Medical History:   Diagnosis Date     Family history of heart disease in female family member before age 65 4/7/2017     Family history of heart disease in male family member before age 55 4/7/2017     Gastroesophageal reflux disease, esophagitis presence not specified 4/7/2017     Irritable bowel syndrome with both constipation and diarrhea 4/7/2017     Migraine without aura and without status migrainosus, not intractable 04/07/2017     Tobacco use disorder        Past Surgical History:    Past Surgical History:   Procedure Laterality Date     ORTHOPEDIC SURGERY  2002    ankle surgery- bone spur removal       Family History:    Family History   Problem Relation Age of Onset     Heart Disease Mother         8 stents; onset 40s?     Chronic Obstructive Pulmonary Disease Mother      Cancer Father         mouth and throat cancer     Heart Disease Brother         stents; age 37; occlusive disease     Bladder Cancer Brother        Social History:  Marital Status:  Single [1]  Social History     Tobacco Use     Smoking status: Current Every Day Smoker     Packs/day: 0.50     Years: 15.00     Pack years: 7.50     Types: Cigarettes     Smokeless tobacco: Never Used   Substance Use Topics     Alcohol use: No     Drug use: No        Medications:    dicyclomine (BENTYL) 20 MG tablet  FLUoxetine (PROZAC) 40 MG capsule  pantoprazole (PROTONIX) 40 MG EC tablet  propranolol ER (INDERAL LA) 120 MG 24 hr capsule  SM ALLERGY RELIEF D  MG 24 hr tablet  SUMAtriptan (IMITREX) 100 MG tablet  medroxyPROGESTERone (DEPO-PROVERA) 150 MG/ML injection  prochlorperazine (COMPAZINE) 5 MG tablet      Review of Systems   All systems reviewed and negative except as noted in HPI.    Physical Exam   BP: (!) 147/108  Pulse: 86  Temp: 97.6  F (36.4  C)  Resp: 16  SpO2: 98 %  Constitutional: Well developed, Well nourished, uncomfortable appearing  HENT: Normocephalic, Atraumatic, Bilateral external ears normal. Neck Supple.  Eyes: EOMI,  Conjunctiva normal.  Respiratory: Breathing comfortably on room air. Speaks full sentences easily. Lungs clear to ascultation.  Cardiovascular: Normal heart rate, Regular rhythm. No peripheral edema.  Abdomen: Tender and guarded in epigastric area and right upper quadrant.  Positive Nolan sign.  Musculoskeletal: Good range of motion in all major joints. No major deformities noted.  Integument: Warm, Dry.  Neurologic: Alert & awake, Normal motor function, Normal sensory function, No focal deficits noted.   Psychiatric: Cooperative. Mood and affect normal.      ED Course        Results for orders placed or performed during the hospital encounter of 02/27/21 (from the past 24 hour(s))   CBC with platelets differential   Result Value Ref Range    WBC 17.2 (H) 4.0 - 11.0 10e9/L    RBC Count 4.77 3.8 - 5.2 10e12/L    Hemoglobin 14.7 11.7 - 15.7 g/dL    Hematocrit 43.5 35.0 - 47.0 %    MCV 91 78 - 100 fl    MCH 30.8 26.5 - 33.0 pg    MCHC 33.8 31.5 - 36.5 g/dL    RDW 13.8 10.0 - 15.0 %    Platelet Count 319 150 - 450 10e9/L    Diff Method Automated Method     % Neutrophils 76.7 %    % Lymphocytes 16.7 %    % Monocytes 5.1 %    % Eosinophils 0.8 %    % Basophils 0.3 %    % Immature Granulocytes 0.4 %    Nucleated RBCs 0 0 /100    Absolute Neutrophil 13.2 (H) 1.6 - 8.3 10e9/L    Absolute Lymphocytes 2.9 0.8 - 5.3 10e9/L    Absolute Monocytes 0.9 0.0 - 1.3 10e9/L    Absolute Eosinophils 0.1 0.0 - 0.7 10e9/L    Absolute Basophils 0.1 0.0 - 0.2 10e9/L    Abs Immature Granulocytes 0.1 0 - 0.4 10e9/L    Absolute Nucleated RBC 0.0    Comprehensive metabolic panel   Result Value Ref Range    Sodium 139 133 - 144 mmol/L    Potassium 3.5 3.4 - 5.3 mmol/L    Chloride 109 94 - 109 mmol/L    Carbon Dioxide 24 20 - 32 mmol/L    Anion Gap 6 3 - 14 mmol/L    Glucose 112 (H) 70 - 99 mg/dL    Urea Nitrogen 14 7 - 30 mg/dL    Creatinine 0.59 0.52 - 1.04 mg/dL    GFR Estimate >90 >60 mL/min/[1.73_m2]    GFR Estimate If Black >90 >60  mL/min/[1.73_m2]    Calcium 8.9 8.5 - 10.1 mg/dL    Bilirubin Total 0.5 0.2 - 1.3 mg/dL    Albumin 3.7 3.4 - 5.0 g/dL    Protein Total 7.1 6.8 - 8.8 g/dL    Alkaline Phosphatase 77 40 - 150 U/L    ALT 19 0 - 50 U/L    AST 8 0 - 45 U/L   Lipase   Result Value Ref Range    Lipase 66 (L) 73 - 393 U/L   HCG quantitative pregnancy (blood)   Result Value Ref Range    HCG Quantitative Serum <1 0 - 5 IU/L   Abdomen US, limited (RUQ only)    Narrative    PROCEDURES: US ABDOMEN LIMITED    HISTORY: . RUQ pain and tenderness, RUQ pain and tenderness.    TECHNIQUE: Grayscale ultrasound of the upper abdomen was performed.    COMPARISON: None.     FINDINGS:    LIVER: The liver is normal in size and echogenicity. The echotexture  is smooth. There is no intrahepatic mass or biliary ductal dilatation.  There is hepatopetal flow in the main portal vein. The surface of the  liver is smooth.    GALLBLADDER: Multiple small gallstones and sludge are present. The  gallbladder wall is within normal limits in thickness. No  pericholecystic fluid is seen. Common bile duct is upper normal in  caliber at 7 mm.    ABDOMINAL AORTA AND IVC: Portions of the superior IVC and abdominal  aorta are visualized.    PANCREAS:The visualized portions of the pancreas are unremarkable.    OTHER: Survey imaging of the right kidney demonstrates no  hydronephrosis. No free fluid is seen..      Impression    IMPRESSION:     Cholelithiasis. Upper normal common bile duct. Correlate with serum  bilirubin level.    SUGEY GARCIA MD       Medications   piperacillin-tazobactam (ZOSYN) infusion 3.375 g (has no administration in time range)   morphine (PF) injection 4 mg (4 mg Intravenous Given 2/27/21 1444)   ondansetron (ZOFRAN) injection 4 mg (4 mg Intravenous Given 2/27/21 1444)   0.9% sodium chloride BOLUS (0 mLs Intravenous Stopped 2/27/21 1544)   morphine (PF) injection 4 mg (4 mg Intravenous Given 2/27/21 1623)   ondansetron (ZOFRAN) injection 4 mg (4 mg  Intravenous Given 2/27/21 2904)       Assessments & Plan (with Medical Decision Making)   Patient is a previously healthy 35-year-old female who presents with epigastric and right upper quadrant abdominal pain.  She is uncomfortable appearing but vitally stable and nontoxic when she arrives to the emergency department.  She has right upper quadrant tenderness and guarding on exam.  Labs are notable for leukocytosis with left shift, normal liver function tests lipase and bilirubin.  Right upper quadrant ultrasound does show cholelithiasis with borderline CBD dilation but no clear wall thickening or pericholecystic fluid.  Despite 2 doses of morphine and Zofran here she remained fairly uncomfortable and tender especially in her right upper quadrant.  Discussed case with general surgery who evaluated the patient here in the emergency department and agreed with proceeding immediately to the OR for laparoscopic cholecystectomy.  She was given a dose of Zosyn here.  Remained otherwise stable throughout her ED course.    I have reviewed the nursing notes.    I have reviewed the findings, diagnosis, plan and need for follow up with the patient.      Final diagnoses:   Acute cholecystitis       2/27/2021   [facility]    Dominick Bee MD  02/27/21 4557

## 2021-02-27 NOTE — ANESTHESIA PREPROCEDURE EVALUATION
Anesthesia Pre-Procedure Evaluation    Patient: Aubree Corcoran   MRN: [id number] : 1985        Preoperative Diagnosis: Acute cholecystitis [K81.0]   Procedure : Procedure(s):  LAPAROSCOPIC CHOLECYSTECTOMY, POSSIBLE OPEN     Past Medical History:   Diagnosis Date     Family history of heart disease in female family member before age 65 2017     Family history of heart disease in male family member before age 55 2017     Gastroesophageal reflux disease, esophagitis presence not specified 2017     Irritable bowel syndrome with both constipation and diarrhea 2017     Migraine without aura and without status migrainosus, not intractable 2017    Dr Cade, neurology     Tobacco use disorder       Past Surgical History:   Procedure Laterality Date     ORTHOPEDIC SURGERY      ankle surgery- bone spur removal      Allergies   Allergen Reactions     Linaclotide      diarrhea      Social History     Tobacco Use     Smoking status: Current Every Day Smoker     Packs/day: 0.50     Years: 15.00     Pack years: 7.50     Types: Cigarettes     Smokeless tobacco: Never Used   Substance Use Topics     Alcohol use: No      Wt Readings from Last 1 Encounters:   20 84.4 kg (186 lb)        Anesthesia Evaluation   Pt has had prior anesthetic.     No history of anesthetic complications       ROS/MED HX  ENT/Pulmonary:     (+) tobacco use, Current use, 1 packs/day, 15  Pack-Year Hx,      Neurologic:     (+) migraines,     Cardiovascular:  - neg cardiovascular ROS     METS/Exercise Tolerance: >4 METS    Hematologic:  - neg hematologic  ROS     Musculoskeletal:  - neg musculoskeletal ROS     GI/Hepatic:     (+) GERD, Asymptomatic on medication, Inflammatory bowel disease, cholecystitis/cholelithiasis,     Renal/Genitourinary:  - neg Renal ROS     Endo:     (+) Obesity,     Psychiatric/Substance Use:     (+) psychiatric history anxiety and depression     Infectious Disease:  - neg infectious disease ROS      Malignancy:  - neg malignancy ROS     Other:  - neg other ROS          Physical Exam    Airway        Mallampati: III   TM distance: > 3 FB   Neck ROM: full   Mouth opening: < 3 cm    Respiratory Devices and Support         Dental  no notable dental history         Cardiovascular   cardiovascular exam normal       Rhythm and rate: regular and normal     Pulmonary   pulmonary exam normal        breath sounds clear to auscultation           OUTSIDE LABS:  CBC:   Lab Results   Component Value Date    WBC 17.2 (H) 02/27/2021    HGB 14.7 02/27/2021    HCT 43.5 02/27/2021     02/27/2021     BMP:   Lab Results   Component Value Date     02/27/2021    POTASSIUM 3.5 02/27/2021    CHLORIDE 109 02/27/2021    CO2 24 02/27/2021    BUN 14 02/27/2021    CR 0.59 02/27/2021     (H) 02/27/2021     COAGS: No results found for: PTT, INR, FIBR  POC: No results found for: BGM, HCG, HCGS  HEPATIC:   Lab Results   Component Value Date    ALBUMIN 3.7 02/27/2021    PROTTOTAL 7.1 02/27/2021    ALT 19 02/27/2021    AST 8 02/27/2021    ALKPHOS 77 02/27/2021    BILITOTAL 0.5 02/27/2021     OTHER:   Lab Results   Component Value Date    NATIVIDAD 8.9 02/27/2021    LIPASE 66 (L) 02/27/2021       Anesthesia Plan    ASA Status:  3   NPO Status:  ELEVATED Aspiration Risk/Unknown    Anesthesia Type: General.     - Airway: ETT   Induction: Intravenous, RSI.   Maintenance: Inhalation.        Consents    Anesthesia Plan(s) and associated risks, benefits, and realistic alternatives discussed. Questions answered and patient/representative(s) expressed understanding.     - Discussed with:  Patient         Postoperative Care    Pain management: IV analgesics.   PONV prophylaxis: Ondansetron (or other 5HT-3), Dexamethasone or Solumedrol     Comments:                CYRIL Smith CRNA

## 2021-02-27 NOTE — H&P (VIEW-ONLY)
Emergency Room Consult  2/27/2021    Patient : Aubree Corcoran    Admitting Physician : Delmar French DO    Reason for Admission : Acute Cholecystitis    Length of stay is not expected to be greater than two nights.    This is a 35 year old female who presented to the hospital with Acute Cholecystitis.     Date of onset : 2/27/2021   Fatty food intolerance : YES   Time after eating to initiation of symptoms  : 30min   Duration of attacks : continuous   History of jaundice : NO   History of Pancreatitis : NO      Ultrasound shows cholelithiasis : YES    Evidence of acute cholecystitis :  YES    Evidence of gangrenous cholecystitis:  NO    Evidence of choledocholithiasis : NO    Past Medical History:  Past Medical History:   Diagnosis Date     Family history of heart disease in female family member before age 65 4/7/2017     Family history of heart disease in male family member before age 55 4/7/2017     Gastroesophageal reflux disease, esophagitis presence not specified 4/7/2017     Irritable bowel syndrome with both constipation and diarrhea 4/7/2017     Migraine without aura and without status migrainosus, not intractable 04/07/2017    Dr Cade, neurology     Tobacco use disorder        Past Surgical History:  Past Surgical History:   Procedure Laterality Date     ORTHOPEDIC SURGERY  2002    ankle surgery- bone spur removal       Family History History:  Family History   Problem Relation Age of Onset     Heart Disease Mother         8 stents; onset 40s?     Chronic Obstructive Pulmonary Disease Mother      Cancer Father         mouth and throat cancer     Heart Disease Brother         stents; age 37; occlusive disease     Bladder Cancer Brother        History of Tobacco Use:  History   Smoking Status     Current Every Day Smoker     Packs/day: 0.50     Years: 15.00     Types: Cigarettes   Smokeless Tobacco     Never Used       Current Medications:  Current Outpatient Medications   Medication Sig Dispense  Refill     dicyclomine (BENTYL) 20 MG tablet TAKE 1 TABLET BY MOUTH 3 TIMES A DAY (Patient taking differently: daily ) 180 tablet 1     FLUoxetine (PROZAC) 40 MG capsule Take 1 capsule (40 mg) by mouth daily 90 capsule 0     pantoprazole (PROTONIX) 40 MG EC tablet Take 1 tablet (40 mg) by mouth daily 90 tablet 3     propranolol ER (INDERAL LA) 120 MG 24 hr capsule Take 1 capsule (120 mg) by mouth daily 90 capsule 3     SM ALLERGY RELIEF D  MG 24 hr tablet TAKE 1 TABLET BY MOUTH DAILY 15 tablet 3     SUMAtriptan (IMITREX) 100 MG tablet TAKE 1 TABLET BY MOUTH AT ONSET OF HEADACHE FOR MIGRAINE 9 tablet 3     medroxyPROGESTERone (DEPO-PROVERA) 150 MG/ML injection Inject 1 mL (150 mg) into the muscle every 3 months 0.9 mL 3     prochlorperazine (COMPAZINE) 5 MG tablet TAKE 1 TO 2 TABLETS BY MOUTH EVERY 8 HOURS AS NEEDED FOR NAUSEA 90 tablet 0       Allergies:  Allergies   Allergen Reactions     Linaclotide      diarrhea       ROS:  Pertinent items are noted in HPI.  All other systems are negative.    PHYSICAL EXAM:     Vital signs: /93   Pulse 77   Temp 97.6  F (36.4  C) (Tympanic)   Resp 16   SpO2 95%    Weight: [unfilled]   BMI: There is no height or weight on file to calculate BMI.   General: Normal, healthy, cooperative   Skin: no rashes, no ecchymoses   HEENT: PERRLA, EOMI and Sclera clear, anicteric   Neck: supple, without adenopathy   Lungs: clear to auscultation, without wheezes   CV: Regular rate and rhythm without murmer   Abdominal: abdomen is soft, non distended, tender to palpation ruq with positive veag sign   Extremities: No cyanosis, clubbing or edema noted bilaterally in Upper and Lower Extremities   Neurological: without deficit, cranial nerves intact    CBC RESULTS:   Recent Labs   Lab Test 02/27/21  1435   WBC 17.2*   RBC 4.77   HGB 14.7   HCT 43.5   MCV 91   MCH 30.8   MCHC 33.8   RDW 13.8          Last Comprehensive Metabolic Panel:  Sodium   Date Value Ref Range Status    02/27/2021 139 133 - 144 mmol/L Final     Potassium   Date Value Ref Range Status   02/27/2021 3.5 3.4 - 5.3 mmol/L Final     Chloride   Date Value Ref Range Status   02/27/2021 109 94 - 109 mmol/L Final     Carbon Dioxide   Date Value Ref Range Status   02/27/2021 24 20 - 32 mmol/L Final     Anion Gap   Date Value Ref Range Status   02/27/2021 6 3 - 14 mmol/L Final     Glucose   Date Value Ref Range Status   02/27/2021 112 (H) 70 - 99 mg/dL Final     Urea Nitrogen   Date Value Ref Range Status   02/27/2021 14 7 - 30 mg/dL Final     Creatinine   Date Value Ref Range Status   02/27/2021 0.59 0.52 - 1.04 mg/dL Final     GFR Estimate   Date Value Ref Range Status   02/27/2021 >90 >60 mL/min/[1.73_m2] Final     Comment:     Non  GFR Calc  Starting 12/18/2018, serum creatinine based estimated GFR (eGFR) will be   calculated using the Chronic Kidney Disease Epidemiology Collaboration   (CKD-EPI) equation.       Calcium   Date Value Ref Range Status   02/27/2021 8.9 8.5 - 10.1 mg/dL Final     Bilirubin Total   Date Value Ref Range Status   02/27/2021 0.5 0.2 - 1.3 mg/dL Final     Alkaline Phosphatase   Date Value Ref Range Status   02/27/2021 77 40 - 150 U/L Final     ALT   Date Value Ref Range Status   02/27/2021 19 0 - 50 U/L Final     AST   Date Value Ref Range Status   02/27/2021 8 0 - 45 U/L Final       ASSESSMENT:    35 year old female with Acute Cholecystitis.        PLAN:   Admit to the hospital.     WILL NOT have hospitalist visit with the patient and assess for surgical risks.     WILL start the patient antibiotics.     Will plan on a laparoscopic possible open cholecystectomy.      The plan and the procedure were explained with its risk, benefits and alternatives.  Risks include but are not limited to conversion to an open procedure, wound infections, development of an incisional hernia, damager to internal organs, damage to the common bile duct, need for additional procedures.  All of the  patients questions were answered.  Patient consents to proceed.  The procedure will be scheduled.

## 2021-02-27 NOTE — ED NOTES
Mid upper abdominal pain since last evening at approx 2000, worse with palpation. No has abd surgeries. No pain/burning with urination. Last BM yesterday. Denies diarrhea or black/bloody stools. C/o nausea and dry heaving. No fevers/chills. Bowel sounds active in all quadrants.

## 2021-02-28 VITALS
RESPIRATION RATE: 20 BRPM | SYSTOLIC BLOOD PRESSURE: 118 MMHG | DIASTOLIC BLOOD PRESSURE: 73 MMHG | TEMPERATURE: 98.6 F | OXYGEN SATURATION: 92 % | BODY MASS INDEX: 34.14 KG/M2 | WEIGHT: 199.96 LBS | HEIGHT: 64 IN | HEART RATE: 96 BPM

## 2021-02-28 LAB
ALBUMIN SERPL-MCNC: 3.2 G/DL (ref 3.4–5)
ALP SERPL-CCNC: 71 U/L (ref 40–150)
ALT SERPL W P-5'-P-CCNC: 24 U/L (ref 0–50)
ANION GAP SERPL CALCULATED.3IONS-SCNC: 6 MMOL/L (ref 3–14)
AST SERPL W P-5'-P-CCNC: 14 U/L (ref 0–45)
BILIRUB SERPL-MCNC: 0.4 MG/DL (ref 0.2–1.3)
BUN SERPL-MCNC: 14 MG/DL (ref 7–30)
CALCIUM SERPL-MCNC: 8.3 MG/DL (ref 8.5–10.1)
CHLORIDE SERPL-SCNC: 110 MMOL/L (ref 94–109)
CO2 SERPL-SCNC: 22 MMOL/L (ref 20–32)
CREAT SERPL-MCNC: 0.58 MG/DL (ref 0.52–1.04)
GFR SERPL CREATININE-BSD FRML MDRD: >90 ML/MIN/{1.73_M2}
GLUCOSE SERPL-MCNC: 117 MG/DL (ref 70–99)
POTASSIUM SERPL-SCNC: 3.9 MMOL/L (ref 3.4–5.3)
PROT SERPL-MCNC: 6.6 G/DL (ref 6.8–8.8)
SODIUM SERPL-SCNC: 138 MMOL/L (ref 133–144)

## 2021-02-28 PROCEDURE — 80053 COMPREHEN METABOLIC PANEL: CPT | Performed by: SURGERY

## 2021-02-28 PROCEDURE — 250N000013 HC RX MED GY IP 250 OP 250 PS 637: Performed by: SURGERY

## 2021-02-28 PROCEDURE — 250N000011 HC RX IP 250 OP 636: Performed by: SURGERY

## 2021-02-28 PROCEDURE — 36415 COLL VENOUS BLD VENIPUNCTURE: CPT | Performed by: SURGERY

## 2021-02-28 RX ORDER — NALOXONE HYDROCHLORIDE 0.4 MG/ML
0.4 INJECTION, SOLUTION INTRAMUSCULAR; INTRAVENOUS; SUBCUTANEOUS
Status: DISCONTINUED | OUTPATIENT
Start: 2021-02-28 | End: 2021-02-28 | Stop reason: HOSPADM

## 2021-02-28 RX ORDER — OXYCODONE AND ACETAMINOPHEN 5; 325 MG/1; MG/1
1-2 TABLET ORAL EVERY 6 HOURS PRN
Status: DISCONTINUED | OUTPATIENT
Start: 2021-02-28 | End: 2021-02-28 | Stop reason: HOSPADM

## 2021-02-28 RX ORDER — HYDROMORPHONE HYDROCHLORIDE 1 MG/ML
0.5 INJECTION, SOLUTION INTRAMUSCULAR; INTRAVENOUS; SUBCUTANEOUS
Status: DISCONTINUED | OUTPATIENT
Start: 2021-02-28 | End: 2021-02-28 | Stop reason: HOSPADM

## 2021-02-28 RX ORDER — OXYCODONE AND ACETAMINOPHEN 5; 325 MG/1; MG/1
1-2 TABLET ORAL EVERY 6 HOURS PRN
Qty: 12 TABLET | Refills: 0 | Status: SHIPPED | OUTPATIENT
Start: 2021-02-28 | End: 2021-03-08

## 2021-02-28 RX ORDER — NALOXONE HYDROCHLORIDE 0.4 MG/ML
0.2 INJECTION, SOLUTION INTRAMUSCULAR; INTRAVENOUS; SUBCUTANEOUS
Status: DISCONTINUED | OUTPATIENT
Start: 2021-02-28 | End: 2021-02-28 | Stop reason: HOSPADM

## 2021-02-28 RX ADMIN — OXYCODONE HYDROCHLORIDE AND ACETAMINOPHEN 1 TABLET: 5; 325 TABLET ORAL at 08:28

## 2021-02-28 RX ADMIN — TAZOBACTAM SODIUM AND PIPERACILLIN SODIUM 3.38 G: 375; 3 INJECTION, SOLUTION INTRAVENOUS at 00:54

## 2021-02-28 RX ADMIN — IBUPROFEN 600 MG: 600 TABLET ORAL at 06:50

## 2021-02-28 RX ADMIN — TAZOBACTAM SODIUM AND PIPERACILLIN SODIUM 3.38 G: 375; 3 INJECTION, SOLUTION INTRAVENOUS at 06:39

## 2021-02-28 RX ADMIN — HYDROMORPHONE HYDROCHLORIDE 0.5 MG: 1 INJECTION, SOLUTION INTRAMUSCULAR; INTRAVENOUS; SUBCUTANEOUS at 04:36

## 2021-02-28 RX ADMIN — HYDROMORPHONE HYDROCHLORIDE 0.5 MG: 1 INJECTION, SOLUTION INTRAMUSCULAR; INTRAVENOUS; SUBCUTANEOUS at 01:28

## 2021-02-28 NOTE — ANESTHESIA CARE TRANSFER NOTE
Patient: Aubree Corcoran    Procedure(s):  LAPAROSCOPIC CHOLECYSTECTOMY,    Diagnosis: Acute cholecystitis [K81.0]  Diagnosis Additional Information: No value filed.    Anesthesia Type:   General     Note:      Level of Consciousness: drowsy  Oxygen Supplementation: nasal cannula  Level of Supplemental Oxygen (L/min / FiO2): 2  Independent Airway: airway patency satisfactory and stable  Dentition: dentition unchanged  Vital Signs Stable: post-procedure vital signs reviewed and stable  Report to RN Given: handoff report given  Patient transferred to: PACU      post-procedure handoff checklist not completed for medical reasons    Vitals: (Last set prior to Anesthesia Care Transfer)  CRNA VITALS  2/27/2021 1952 - 2/27/2021 2028 2/27/2021             Resp Rate (set):  8        Electronically Signed By: CYRIL Smith CRNA  February 27, 2021  8:28 PM

## 2021-02-28 NOTE — ED NOTES
Down to surgery. Consents signed. Pain at tolerable level upon transfer to surgery. Pre op checklist done. Report given to Shari BOYCE.

## 2021-02-28 NOTE — ANESTHESIA POSTPROCEDURE EVALUATION
Patient: Aubree Corcoran    Procedure(s):  LAPAROSCOPIC CHOLECYSTECTOMY,    Diagnosis:Acute cholecystitis [K81.0]  Diagnosis Additional Information: No value filed.    Anesthesia Type:  General    Note:  Disposition: Inpatient   Postop Pain Control: Uneventful            Sign Out: Well controlled pain   PONV: No   Neuro/Psych: Uneventful            Sign Out: Acceptable/Baseline neuro status   Airway/Respiratory: Uneventful            Sign Out: Acceptable/Baseline resp. status   CV/Hemodynamics: Uneventful            Sign Out: Acceptable CV status   Other NRE: NONE   DID A NON-ROUTINE EVENT OCCUR? No         Last vitals:  Vitals:    02/27/21 1600 02/27/21 1735 02/27/21 1815   BP: 147/93 154/96 154/96   Pulse: 77 82 83   Resp:  20 20   Temp:  98.2  F (36.8  C) 98.2  F (36.8  C)   SpO2: 95% 96% 96%       Last vitals prior to Anesthesia Care Transfer:  CRNA VITALS  2/27/2021 1952 - 2/27/2021 2039 2/27/2021             Resp Rate (set):  8          Electronically Signed By: CYRIL Smith CRNA  February 27, 2021  8:39 PM

## 2021-02-28 NOTE — ANESTHESIA PROCEDURE NOTES
Airway   Date/Time: 2/27/2021 6:36 PM   Patient location during procedure: OR  Staff -   CRNA: Charley Salazar APRN CRNA  Performed By: CRNA    Consent for Airway   Urgency: emergent    Indications and Patient Condition  Indications for airway management: geraldo-procedural  Induction type:RSIMask difficulty assessment: 0 - not attempted    Final Airway Details  Final airway type: endotracheal airway  Successful airway:ETT - single  Endotracheal Airway Details   ETT size (mm): 6.5  Successful intubation technique: direct laryngoscopy  Grade View of Cords: 1  Adjucts: stylet  Measured from: lips  Secured at (cm): 22  Secured with: plastic tape  Bite block used: None    Post intubation assessment   Placement verified by: capnometry, equal breath sounds and chest rise   Number of attempts at approach: 1  Secured with:plastic tape  Ease of procedure: easy  Dentition: Intact

## 2021-02-28 NOTE — PLAN OF CARE
No acute changes. Alert and oriented; VSS. 4 trochar sites to abdomen approximated and reddened; no drainage noted. Hyperactive bowel sounds and able to pass gas. Complained of 5/10 incisional pain; relieved with PRN percocet. IV removed on discharge. Alarms on for safety, call light remains within reach and makes needs known. Up SBA. Bathroom voiding; tolerating clear liquid diet.     Patient discharged at 10:08 AM via wheel chair accompanied by sister and staff. Prescriptions sent with patient to fill . All belongings sent with patient.     Discharge instructions reviewed with patient and sister. Listed belongings gathered and returned to patient.     Patient discharged to home.     Core Measures and Vaccines  Core Measures applicable during stay: No. If yes, state diagnosis:   Pneumonia and Influenza given prior to discharge, if indicated: N/A    Surgical Patient   Surgical Procedures during stay: cholecystectomy   Did patient receive discharge instruction on wound care and recognition of infection symptoms? Yes    MISC  Follow up appointment made: no - patient given instructions on how to   Home and hospital aquired medications returned to patient: Yes  Patient reports pain was well managed at discharge: Yes

## 2021-02-28 NOTE — OP NOTE
REPORT OF OPERATION  DATE OF PROCEDURE: 2/27/2021    PATIENT: Aubree Corcoran    SURGERY PREFORMED: Laparoscopic Cholecystectomy    PREOPERATIVE DIAGNOSIS: Acute Cholecystitis    POSTOPERATIVE DIAGNOSIS: Acute Cholecystitis    SURGEON: Delmar French DO    ASSISTANTS: None    ANESTHESIA: General Endotracheal Anesthesia    COMPLICATIONS: None apparent    TRANSFUSIONS: None    TISSUE TO PATHOLOGY: Gallbladder and contents to Pathology for pathological diagnosis    FINDINGS: Acute Cholecystitis with clear bile indicative of cystic duct obstruction secondary to gallstone    INDICATIONS: This is a 35 year old female with Acute Cholecystitis.  The patient will be taken to the operative suite for a laparoscopic possible open cholecystectomy.    DESCRIPTIONS OF PROCEDURE IN DETAIL: After consent was obtained the patient was taken to the operative suite and hermila in the supine position.  The patient was identified and the correct patient was confirmed.  General endotracheal anesthesia was induced by anesthesia.  The patient was sterilely prepped and draped in the usual fashion.  A time out was preformed verifying the correct patient and the correct procedure.  The entire operative team was in agreement.  All necessary equipment and supplies were in the room.    Through a infraumbilical incision, the skin was sharply entered and dissection was carried down until isolation of the fascia.  Via Wilner technique, entrance into the abdomen was accomplished.  A 10mm blunt-ended trocar was then inserted into the abdomen and pneumoperitoneum was obtained.  The laparoscope was inserted through the trocar and verification of no intraabdominal trauma was made.  Under direct visualization two 5mm right lateral trocars and a 5mm subxiphoid trocar were placed with verification of no intraabdominal trauma.  The fundus of the gallbladder was then identified, isolated and grasped with an atraumatic grasper and retracted cephalad.   Tania's pouch was then identified, isolated and grasped with an atraumatic grasper and retracted laterally and inferiorly opening the [place] of [place].  Dissection in the [place] of [place] yielded the cystic duct which was divided between clips.  The cystic artery was then identified and divided between clips.  The gallbladder was removed from the gallbladder fossa using Bovie electrocautery.   The gallbladder was removed from the abdomen and sent to Pathology for pathological diagnoses.  Hemostasis was assured.  All instrumentation was removed.  Sponge, needle and instrument counts were correct.  The supraumbilical trocars sites fascia was closed with a figure-of-eight of 0 Vicryl.  All skin incisions were closed with subcuticular 4-0 Monocryl.  Sterile dressings were applied.  The patient was awakened in the operative suite and extubated without difficulty and taken to the recovery room in stable condition tolerating the procedure well

## 2021-02-28 NOTE — PROVIDER NOTIFICATION
Called Dr. French for additional PRN pain medications. Informed him that at that time pt only had PRN Ibuprofen Q6H and was too early for dosing. Pain was rating 6/10. MD states he would put in additional orders.

## 2021-02-28 NOTE — PLAN OF CARE
"Reason for hospital stay:  Post Surgical   Living situation PTA:Home   Most recent vitals: /66   Pulse 93   Temp 98.4  F (36.9  C) (Tympanic)   Resp 18   Ht 1.626 m (5' 4\")   Wt 90.7 kg (199 lb 15.3 oz)   SpO2 93%   BMI 34.32 kg/m      Pt was awake on and off throughout shift, A&Ox4 on RA.  At beginning denied pain stating PRN Ibuprofen was helping. Around 0100 AM pain rating 6/10. Too early for the only PRN analgesic to be give. Surgeon was called and orders were entered per surgeon. Given 2x doses 0.5 mg IV Dilaudid, which resolves pain. VSS and assessments as charted. 4x trochanter sites WNL, some erythema and ecchymosis noted, has liquid bandage closure otherwise open to air. Encouraged pt to splint abd and utilize IS Q2H if awake which she has been doing independently. IV in right AC SL. Infusing abx per orders without issues. Pt is ordered Clear Liquid Diet and is aware. Able to ambulate to bathroom as SBA and within arms reach. Void not measured dt no collection device in toilet at that time.         Safety:  Call button within reach, calls appropriately and makes needs known. Instructed pt to call if needing OOB. She verbalized understanding. Alarms on and audible.       Face to face report given with opportunity to observe patient.    Report given to AUSTEN Hermosillo RN   2/28/2021  7:24 AM    "

## 2021-02-28 NOTE — PLAN OF CARE
"[facility] Admission Note:    Patient admitted to 3226/3226-1 at approximately 2145 via cart accompanied by nurse from surgery . Report received from Robyn in SBAR format at 2145 via face to face in room. Patient transferred to bed via self.. Patient is alert and oriented X 3, denies pain; rates at 0 on 0-10 scale.  Patient oriented to room, unit, hourly rounding, and plan of care. Explained admission packet and patient handbook with patient bill of rights brochure. Will continue to monitor and document as needed.     Inpatient Nursing criteria listed below was met:    Health care directives status obtained and documented: Yes    Care Everywhere authorization obtained Yes    MRSA swab completed for patient 65 years and older: N/A    Patient identifies a surrogate decision maker: No If yes, who:N/A Contact Information:N/A     If initial lactic acid >2.0, repeat lactic acid drawn within one hour of arrival to unit: NA. If no, state reason: N/A    Vaccination assessment and education completed: Yes   Vaccinations received prior to admission: Pneumovax no  Influenza(seasonal)  NO   Vaccination(s) ordered: influenza vaccine    Clergy visit ordered if patient requests: N/A    Skin issues/needs documented: Yes    Isolation Patient: no Education given, correct sign in place and documentation row added to PCS:  No    Fall Prevention Yes: Care plan updated, education given and documented, sticker and magnet in place: N/A    Care Plan initiated: Yes    Education Documented (including assessment): Yes    Patient has discharge needs : No If yes, please explain:N/A    /70   Pulse 85   Temp 97.6  F (36.4  C) (Tympanic)   Resp 16   Ht 1.626 m (5' 4\")   Wt 90.7 kg (199 lb 15.3 oz)   SpO2 94%   BMI 34.32 kg/m      Face to face report given with opportunity to observe patient.    Report given to AUSTEN Hernandez RN   2/27/2021  11:21 PM    "

## 2021-03-02 LAB — COPATH REPORT: NORMAL

## 2021-03-05 ENCOUNTER — OFFICE VISIT (OUTPATIENT)
Dept: SURGERY | Facility: OTHER | Age: 36
End: 2021-03-05
Attending: CLINICAL NURSE SPECIALIST
Payer: COMMERCIAL

## 2021-03-05 VITALS
DIASTOLIC BLOOD PRESSURE: 70 MMHG | SYSTOLIC BLOOD PRESSURE: 104 MMHG | BODY MASS INDEX: 31.92 KG/M2 | WEIGHT: 187 LBS | OXYGEN SATURATION: 97 % | HEART RATE: 106 BPM | HEIGHT: 64 IN | TEMPERATURE: 98.1 F

## 2021-03-05 DIAGNOSIS — Z90.49 S/P CHOLECYSTECTOMY: Primary | ICD-10-CM

## 2021-03-05 PROCEDURE — G0463 HOSPITAL OUTPT CLINIC VISIT: HCPCS

## 2021-03-05 ASSESSMENT — MIFFLIN-ST. JEOR: SCORE: 1528.23

## 2021-03-05 ASSESSMENT — PAIN SCALES - GENERAL: PAINLEVEL: MODERATE PAIN (4)

## 2021-03-05 NOTE — NURSING NOTE
"Chief Complaint   Patient presents with     Surgical Followup     laparoscopic cholecystectomy 2/27/2021 Dr. French.       Initial /70   Pulse 106   Temp 98.1  F (36.7  C)   Ht 1.626 m (5' 4\")   Wt 84.8 kg (187 lb)   SpO2 97%   BMI 32.10 kg/m   Estimated body mass index is 32.1 kg/m  as calculated from the following:    Height as of this encounter: 1.626 m (5' 4\").    Weight as of this encounter: 84.8 kg (187 lb).  Medication Reconciliation: complete  FABY NULL LPN      "

## 2021-03-05 NOTE — LETTER
[facility] - [place]  [address] [facility]  [place] [address]  [phone]          March 5, 2021    RE:  Aubree Corcoran                                                                                                                                                       [address]  [facility] [address]            To whom it may concern:    Aubree Corcoran is under my professional care for surgical care. She  may return to work with the following: The employee is UNABLE to return to work until seen in clinic follow up on March 19, 2021    When the patient returns to work, the following restrictions apply until 3/26/2021:  A) Bend: Occasionally (1-3 hours)  B) Squat: Occasionally (1-3 hours)  C) Walk/Stand: Occasionally (1-3 hours)  D) Reach Above Shoulders: Occasionally (1-3 hours)  E) Lift, carry, push, and pull no more than:  15 lbs.Light duty-unable to bend, stoop or twist on or about 3/26/2021.      Sincerely,        Hilaria Gusman CNS

## 2021-03-05 NOTE — PATIENT INSTRUCTIONS
Thank you for allowing [facility] and our surgical team to participate in your care. Please call our health unit coordinator at [phone] with scheduling questions or the nurse at [phone] with any other questions or concerns.

## 2021-03-05 NOTE — PROGRESS NOTES
"S:  Aubree returns six days after laparoscopic cholecystectomy by Dr. French for acute cholecystitis. Doing well post operatively, tolerating a general diet, having regular bowel movements, passing flatus, no diarrhea, stopped taking narcotics and taking ibuprofen/acetaminophen. Specifically denies fevers, chills, nausea, vomiting, shortness of breath.     O:  /70   Pulse 106   Temp 98.1  F (36.7  C)   Ht 1.626 m (5' 4\")   Wt 84.8 kg (187 lb)   SpO2 97%   BMI 32.10 kg/m    Gen: Alert and orientedx4, no apparent distress, ambulating without difficulty  Abd: Soft, ND/NT no rebound, no guarding  Wound: Clean, dry, intact, no erythema, necrosis or drainage worrisome for infection.  The glue is peeling off from one of the port sites, therefore applied steri strips after approximating the wound.     A/P  #1 S/P Laparoscopic cholecystectomy    She looks good today and is healing as expected.  I would like to see her again in two weeks to monitor the progress of her recovery.  She is wondering when she can go back to work; work requires bending, lifting, and ambulating therefore I would like her to remain off from work until she is seen again in two weeks.     I've provided office phone numbers and told her not to hesitate to call with any questions, comments or concerns. I'd like a call if she has any fevers over 101.3, nausea/vomiting, pain out of control.  She  been instructed to refrain from lifting greater than 15 lbs for another two weeks and to avoid strenuous activity as well.    21 minutes spent on the date of the encounter doing chart review, patient visit and documentation    CYRIL Dela Cruz CNS CNS  "

## 2021-03-08 ENCOUNTER — HOSPITAL ENCOUNTER (EMERGENCY)
Facility: HOSPITAL | Age: 36
Discharge: HOME | End: 2021-03-09
Attending: INTERNAL MEDICINE | Admitting: INTERNAL MEDICINE
Payer: COMMERCIAL

## 2021-03-08 VITALS
OXYGEN SATURATION: 98 % | RESPIRATION RATE: 16 BRPM | HEART RATE: 110 BPM | DIASTOLIC BLOOD PRESSURE: 92 MMHG | TEMPERATURE: 97.6 F | SYSTOLIC BLOOD PRESSURE: 127 MMHG

## 2021-03-08 DIAGNOSIS — L03.113 CELLULITIS OF RIGHT UPPER EXTREMITY: ICD-10-CM

## 2021-03-08 PROCEDURE — 99283 EMERGENCY DEPT VISIT LOW MDM: CPT

## 2021-03-08 PROCEDURE — 99283 EMERGENCY DEPT VISIT LOW MDM: CPT | Performed by: INTERNAL MEDICINE

## 2021-03-09 PROCEDURE — 250N000013 HC RX MED GY IP 250 OP 250 PS 637: Performed by: INTERNAL MEDICINE

## 2021-03-09 RX ORDER — CEPHALEXIN 500 MG/1
500 CAPSULE ORAL 4 TIMES DAILY
Qty: 20 CAPSULE | Refills: 0 | Status: SHIPPED | OUTPATIENT
Start: 2021-03-09 | End: 2021-03-15

## 2021-03-09 RX ORDER — OXYCODONE HYDROCHLORIDE 5 MG/1
5 TABLET ORAL EVERY 6 HOURS PRN
Qty: 8 TABLET | Refills: 0 | Status: SHIPPED | OUTPATIENT
Start: 2021-03-09 | End: 2021-03-11

## 2021-03-09 RX ORDER — CEPHALEXIN 500 MG/1
1000 CAPSULE ORAL ONCE
Status: COMPLETED | OUTPATIENT
Start: 2021-03-09 | End: 2021-03-09

## 2021-03-09 RX ADMIN — CEPHALEXIN 1000 MG: 500 CAPSULE ORAL at 00:35

## 2021-03-09 NOTE — ED TRIAGE NOTES
Had gallbladder surgery last week.  Area where IV was on right upper arm area is tender, red, and swollen, area is not warm to the touch. Pt mother wanted her to come and get it checked. No fevers

## 2021-03-09 NOTE — ED NOTES
Discharge instructions reviewed with patient.  2 RX's reviewed with patient and given the scripts to patient (keflex and oxycodone) and instructed to fill at pharmacy of choice.  encouraged to f/u here or with PCP for any new or worsening concerns or symptoms.  No questions or concerns.  Denies pain at rest only when the area is touched.

## 2021-03-09 NOTE — ED NOTES
"Had \"emergency gallbladder\" surgery 2/27/21. Area from IV is pinkish and tender to the touch.  No drainage noted. Is not warm to the touch .,   "

## 2021-03-09 NOTE — ED PROVIDER NOTES
History     Chief Complaint   Patient presents with     Wound Check     The history is provided by the patient.     Aubree Corcoran is a 35 year old female who came for pain and redness around IV site insertion on R elbow. She had cholecystectomy 9 days ago. Denies fever, chills.     Allergies:  Allergies   Allergen Reactions     Linaclotide      diarrhea       Problem List:    Patient Active Problem List    Diagnosis Date Noted     Acute cholecystitis 02/27/2021     Priority: Medium     Added automatically from request for surgery [id number]       Single current episode of major depressive disorder, unspecified depression episode severity 09/06/2018     Priority: Medium     Anxiety 09/06/2018     Priority: Medium     Insomnia, unspecified type 09/06/2018     Priority: Medium     ACP (advance care planning) 04/07/2017     Priority: Medium     Advance Care Planning 4/7/2017: ACP Review of Chart / Resources Provided:  Reviewed chart for advance care plan.  Aubree Corcoran has no plan or code status on file. Discussed available resources and provided with information.   Added by Jane Osullivan             Tobacco use disorder 04/07/2017     Priority: Medium     Migraine without aura and without status migrainosus, not intractable 04/07/2017     Priority: Medium     Gastroesophageal reflux disease, esophagitis presence not specified 04/07/2017     Priority: Medium     IMO Regulatory Load OCT 2020       Irritable bowel syndrome with both constipation and diarrhea 04/07/2017     Priority: Medium     Family history of heart disease in male family member before age 55 04/07/2017     Priority: Medium        Past Medical History:    Past Medical History:   Diagnosis Date     Family history of heart disease in female family member before age 65 4/7/2017     Family history of heart disease in male family member before age 55 4/7/2017     Gastroesophageal reflux disease, esophagitis presence not specified 4/7/2017      Irritable bowel syndrome with both constipation and diarrhea 4/7/2017     Migraine without aura and without status migrainosus, not intractable 04/07/2017     Tobacco use disorder        Past Surgical History:    Past Surgical History:   Procedure Laterality Date     LAPAROSCOPIC CHOLECYSTECTOMY N/A 2/27/2021    Procedure: LAPAROSCOPIC CHOLECYSTECTOMY,;  Surgeon: Delmar French DO;  Location: [facility] OR     ORTHOPEDIC SURGERY  2002    ankle surgery- bone spur removal       Family History:    Family History   Problem Relation Age of Onset     Heart Disease Mother         8 stents; onset 40s?     Chronic Obstructive Pulmonary Disease Mother      Cancer Father         mouth and throat cancer     Heart Disease Brother         stents; age 37; occlusive disease     Bladder Cancer Brother        Social History:  Marital Status:  Single [1]  Social History     Tobacco Use     Smoking status: Current Every Day Smoker     Packs/day: 0.50     Years: 15.00     Pack years: 7.50     Types: Cigarettes     Smokeless tobacco: Never Used     Tobacco comment: declines quitplan referral 3/5/2021   Substance Use Topics     Alcohol use: No     Drug use: No        Medications:    acetaminophen (TYLENOL) 325 MG tablet  cephALEXin (KEFLEX) 500 MG capsule  dicyclomine (BENTYL) 20 MG tablet  FLUoxetine (PROZAC) 40 MG capsule  ibuprofen (ADVIL/MOTRIN) 600 MG tablet  medroxyPROGESTERone (DEPO-PROVERA) 150 MG/ML injection  oxyCODONE (ROXICODONE) 5 MG tablet  pantoprazole (PROTONIX) 40 MG EC tablet  prochlorperazine (COMPAZINE) 5 MG tablet  SM ALLERGY RELIEF D  MG 24 hr tablet  SUMAtriptan (IMITREX) 100 MG tablet          Review of Systems   Constitutional: Negative for fever.   HENT: Negative for congestion.    Eyes: Negative for redness.   Respiratory: Negative for shortness of breath.    Cardiovascular: Negative for chest pain.   Gastrointestinal: Negative for abdominal pain.   Genitourinary: Negative for difficulty urinating.    Musculoskeletal: Negative for arthralgias and neck stiffness.   Skin: Negative for color change.   Neurological: Negative for headaches.   Psychiatric/Behavioral: Negative for confusion.       Physical Exam   BP: 127/92  Pulse: 110  Temp: 97.6  F (36.4  C)  Resp: 16  SpO2: 98 %      Physical Exam  Constitutional:       General: She is not in acute distress.     Appearance: She is not diaphoretic.   HENT:      Head: Atraumatic.      Mouth/Throat:      Pharynx: No oropharyngeal exudate.   Eyes:      General: No scleral icterus.     Pupils: Pupils are equal, round, and reactive to light.   Cardiovascular:      Heart sounds: Normal heart sounds.   Pulmonary:      Effort: No respiratory distress.      Breath sounds: Normal breath sounds.   Abdominal:      General: Bowel sounds are normal.      Palpations: Abdomen is soft.      Tenderness: There is no abdominal tenderness.   Musculoskeletal:         General: No tenderness.      Left forearm: She exhibits no tenderness, no bony tenderness, no swelling and no deformity.   Skin:     General: Skin is warm.      Findings: No rash.             Comments: erythma + swelling , mild tenderness on right forearm proximal and medial , about 5 x 3 cm         ED Course        Procedures                   No results found for this or any previous visit (from the past 24 hour(s)).    Medications   cephALEXin (KEFLEX) capsule 1,000 mg (1,000 mg Oral Given 3/9/21 0035)       Assessments & Plan (with Medical Decision Making)   Cellulitis vs superficial thrombophlebitis of IV insertion site  Keflex started  I advised her to return to ER if developed fever, redness and swelling is spreading, increasing or any other unusual symptoms, she understood and agreed.   I have reviewed the nursing notes.    I have reviewed the findings, diagnosis, plan and need for follow up with the patient.      Discharge Medication List as of 3/9/2021 12:32 AM      START taking these medications    Details    cephALEXin (KEFLEX) 500 MG capsule Take 1 capsule (500 mg) by mouth 4 times daily for 5 days, Disp-20 capsule, R-0, Local Print      oxyCODONE (ROXICODONE) 5 MG tablet Take 1 tablet (5 mg) by mouth every 6 hours as needed for pain, Disp-8 tablet, R-0, Local Print             Final diagnoses:   Cellulitis of right upper extremity       3/8/2021   [facility]     Stalin Jolly MD  03/12/21 0795

## 2021-03-12 ASSESSMENT — ENCOUNTER SYMPTOMS
COLOR CHANGE: 0
DIFFICULTY URINATING: 0
ARTHRALGIAS: 0
SHORTNESS OF BREATH: 0
EYE REDNESS: 0
CONFUSION: 0
HEADACHES: 0
ABDOMINAL PAIN: 0
FEVER: 0
NECK STIFFNESS: 0

## 2021-03-15 ENCOUNTER — NURSE TRIAGE (OUTPATIENT)
Dept: FAMILY MEDICINE | Facility: OTHER | Age: 36
End: 2021-03-15

## 2021-03-15 ENCOUNTER — OFFICE VISIT (OUTPATIENT)
Dept: FAMILY MEDICINE | Facility: OTHER | Age: 36
End: 2021-03-15
Attending: FAMILY MEDICINE
Payer: COMMERCIAL

## 2021-03-15 VITALS
SYSTOLIC BLOOD PRESSURE: 122 MMHG | WEIGHT: 187.6 LBS | DIASTOLIC BLOOD PRESSURE: 80 MMHG | HEART RATE: 103 BPM | OXYGEN SATURATION: 98 % | TEMPERATURE: 97.5 F | BODY MASS INDEX: 32.2 KG/M2

## 2021-03-15 DIAGNOSIS — N76.0 VAGINITIS AND VULVOVAGINITIS: ICD-10-CM

## 2021-03-15 DIAGNOSIS — N89.8 VAGINAL DISCHARGE: Primary | ICD-10-CM

## 2021-03-15 LAB
SPECIMEN SOURCE: NORMAL
WET PREP SPEC: NORMAL

## 2021-03-15 PROCEDURE — 99213 OFFICE O/P EST LOW 20 MIN: CPT | Performed by: FAMILY MEDICINE

## 2021-03-15 PROCEDURE — G0463 HOSPITAL OUTPT CLINIC VISIT: HCPCS | Mod: 25

## 2021-03-15 PROCEDURE — 87210 SMEAR WET MOUNT SALINE/INK: CPT | Mod: ZL | Performed by: FAMILY MEDICINE

## 2021-03-15 PROCEDURE — 96372 THER/PROPH/DIAG INJ SC/IM: CPT

## 2021-03-15 RX ORDER — FLUCONAZOLE 150 MG/1
150 TABLET ORAL ONCE
Qty: 1 TABLET | Refills: 0 | Status: SHIPPED | OUTPATIENT
Start: 2021-03-15 | End: 2021-03-15

## 2021-03-15 RX ADMIN — MEDROXYPROGESTERONE ACETATE 150 MG: 150 INJECTION, SUSPENSION INTRAMUSCULAR at 11:46

## 2021-03-15 ASSESSMENT — PAIN SCALES - GENERAL: PAINLEVEL: NO PAIN (0)

## 2021-03-15 NOTE — NURSING NOTE
"Chief Complaint   Patient presents with     ER F/U       Initial /80 (BP Location: Right arm, Patient Position: Chair, Cuff Size: Adult Large)   Pulse 103   Temp 97.5  F (36.4  C) (Tympanic)   Wt 85.1 kg (187 lb 9.6 oz)   SpO2 98%   BMI 32.20 kg/m   Estimated body mass index is 32.2 kg/m  as calculated from the following:    Height as of 3/5/21: 1.626 m (5' 4\").    Weight as of this encounter: 85.1 kg (187 lb 9.6 oz).  Medication Reconciliation: complete  Nhan Luis LPN  "

## 2021-03-15 NOTE — TELEPHONE ENCOUNTER
Emergency Department and Urgent Care Follow-up  This Patient has requested an appointment for ED f/u   Pt is requesting to be seen by PCP today    Please contact the patient at the following phone number [phone]          Reason for ER/UC visit: cellulitis right arm  o Date seen: 3/8/21      New or Worsening symptoms:  No believes this has improved, no redness mild swelling, mild soreness in arm       Prescription Received/Picked up from Pharmacy?: yes   o Medications started? yes  o Any questions or issues regarding your prescription?: no      Follow-up Results or Labs that are pending: no      Questions or concerns?: f/u requested      ER Recommends Follow-up by: 3 days      RN Recommendations: f/u   o Appointment scheduled: routing overbook req    If you start feeling worse, or have any further questions, please feel free to contact Nurse Triage at [phone].  If needing immediate medical attention at any time please call 911/Go to the ER.

## 2021-03-15 NOTE — PROGRESS NOTES
"    Assessment & Plan     Vaginal discharge  - Wet prep  - fluconazole (DIFLUCAN) 150 MG tablet; Take 1 tablet (150 mg) by mouth once for 1 dose    Vaginitis and vulvovaginitis  Wet prep negative, but history and exam consistent with yeast.  Maybe early - mild.    Elected to treat with single dose Diflucan.  - fluconazole (DIFLUCAN) 150 MG tablet; Take 1 tablet (150 mg) by mouth once for 1 dose       BMI:   Estimated body mass index is 32.2 kg/m  as calculated from the following:    Height as of 3/5/21: 1.626 m (5' 4\").    Weight as of this encounter: 85.1 kg (187 lb 9.6 oz).       Patient Instructions   Take single dose Diflucan.  Yogurt, probiotic, hydration.  Follow up as needed.      No follow-ups on file.    Kendra Bailey MD  [facility] - [place] Mak is a 35 year old who presents for the following health issues     HPI       ED/UC Followup:    Facility:  [facility]  Date of visit: 3-8-21  Reason for visit: Cellulitis, right antecubital fossa, IV site; had cholecystectomy 2/27/21; area was 5.3 cm; treated with keflex x 5 days  Current Status: is improving, would like to discuss yeast infection - itching vaginally x 2 days; minimal discharge; denies other std screening concerns.  No fever.  No dysuria, frequency or urgency.  No nausea or vomiting.           Review of Systems   Constitutional, HEENT, cardiovascular, pulmonary, gi and gu systems are negative, except as otherwise noted.      Objective    /80 (BP Location: Right arm, Patient Position: Chair, Cuff Size: Adult Large)   Pulse 103   Temp 97.5  F (36.4  C) (Tympanic)   Wt 85.1 kg (187 lb 9.6 oz)   SpO2 98%   BMI 32.20 kg/m    Body mass index is 32.2 kg/m .  Physical Exam   GENERAL: healthy, alert and no distress   (female): normal female external genitalia, normal urethral meatus , vaginal mucosa pink, moist, well rugated and mildly erythematous; small amount white discharge  SKIN: no suspicious lesions or " rashes; right antecubital fossa now with out erythema or warmth; mild tenderness to palpation  PSYCH: mentation appears normal, affect normal/bright    Results for orders placed or performed in visit on 03/15/21 (from the past 24 hour(s))   Wet prep    Specimen: Vagina   Result Value Ref Range    Specimen Description Vagina     Wet Prep No yeast seen     Wet Prep No clue cells seen     Wet Prep No Trichomonas seen     Wet Prep Many  WBC'S seen

## 2021-03-19 ENCOUNTER — OFFICE VISIT (OUTPATIENT)
Dept: SURGERY | Facility: OTHER | Age: 36
End: 2021-03-19
Attending: CLINICAL NURSE SPECIALIST
Payer: COMMERCIAL

## 2021-03-19 VITALS
HEART RATE: 100 BPM | WEIGHT: 185 LBS | SYSTOLIC BLOOD PRESSURE: 124 MMHG | TEMPERATURE: 97.7 F | BODY MASS INDEX: 31.76 KG/M2 | OXYGEN SATURATION: 98 % | DIASTOLIC BLOOD PRESSURE: 86 MMHG

## 2021-03-19 DIAGNOSIS — Z90.49 S/P CHOLECYSTECTOMY: Primary | ICD-10-CM

## 2021-03-19 PROCEDURE — 99024 POSTOP FOLLOW-UP VISIT: CPT | Performed by: CLINICAL NURSE SPECIALIST

## 2021-03-19 PROCEDURE — G0463 HOSPITAL OUTPT CLINIC VISIT: HCPCS

## 2021-03-19 ASSESSMENT — PAIN SCALES - GENERAL: PAINLEVEL: NO PAIN (0)

## 2021-03-19 NOTE — PATIENT INSTRUCTIONS
Thank you for allowing [facility] and our surgical team to participate in your care. Please call our health unit coordinator at [phone] with scheduling questions or the nurse at [phone] with any other questions or concerns.

## 2021-03-19 NOTE — NURSING NOTE
"Chief Complaint   Patient presents with     Surgical Followup     laparoscopic cholecystectomy 2/27/2021       Initial /86 (BP Location: Left arm, Patient Position: Sitting, Cuff Size: Adult Regular)   Pulse 100   Temp 97.7  F (36.5  C) (Tympanic)   Wt 83.9 kg (185 lb)   SpO2 98%   BMI 31.76 kg/m   Estimated body mass index is 31.76 kg/m  as calculated from the following:    Height as of 3/5/21: 1.626 m (5' 4\").    Weight as of this encounter: 83.9 kg (185 lb).  Medication Reconciliation: complete  Zakiya Turcios LPN  "

## 2021-03-19 NOTE — PROGRESS NOTES
S:  Aubree returns three weeks after laparoscopic cholecystectomy by Dr. French for acute cholecystitis. Doing well post operatively, tolerating a general diet, having regular bowel movements, passing flatus, no diarrhea, stopped taking narcotics and taking ibuprofen/acetaminophen. Specifically denies fevers, chills, nausea, vomiting, shortness of breath.     O:  /86 (BP Location: Left arm, Patient Position: Sitting, Cuff Size: Adult Regular)   Pulse 100   Temp 97.7  F (36.5  C) (Tympanic)   Wt 83.9 kg (185 lb)   SpO2 98%   BMI 31.76 kg/m    Gen: Alert and orientedx4, no apparent distress, ambulating without difficulty  Abd: Soft, ND/NT no rebound, no guarding  Wound: Clean, dry, intact, no erythema, necrosis or drainage worrisome for infection.  T     A/P  #1 S/P Laparoscopic cholecystectomy    She looks good today and is healing as expected.  I do not need to see her again unless she has issues related to the procedure.  She is able to return to work with no restrictions.  I've provided office phone numbers and told her not to hesitate to call with any questions, comments or concerns. I'd like a call if she has any fevers over 101.3, nausea/vomiting, pain out of control.      15 minutes spent on the date of the encounter doing chart review, patient visit and documentation    CYRIL Dela Cruz CNS CNS

## 2021-05-06 DIAGNOSIS — R09.81 CHRONIC NASAL CONGESTION: ICD-10-CM

## 2021-05-06 DIAGNOSIS — G43.009 MIGRAINE WITHOUT AURA AND WITHOUT STATUS MIGRAINOSUS, NOT INTRACTABLE: ICD-10-CM

## 2021-05-06 RX ORDER — LORATADINE AND PSEUDOEPHEDRINE SULFATE 10; 240 MG/1; MG/1
TABLET, FILM COATED, EXTENDED RELEASE ORAL
Qty: 15 TABLET | Refills: 0 | Status: SHIPPED | OUTPATIENT
Start: 2021-05-06 | End: 2021-05-20

## 2021-05-06 NOTE — TELEPHONE ENCOUNTER
Loratadine  Last Written Prescription Date: 2/15/21  Last Fill Quantity: 15 # of Refills: 3  Last Office Visit: 3/15/21

## 2021-06-07 DIAGNOSIS — Z30.42 ENCOUNTER FOR SURVEILLANCE OF INJECTABLE CONTRACEPTIVE: Primary | ICD-10-CM

## 2021-06-07 RX ORDER — MEDROXYPROGESTERONE ACETATE 150 MG/ML
150 INJECTION, SUSPENSION INTRAMUSCULAR
Status: COMPLETED | OUTPATIENT
Start: 2021-06-09 | End: 2022-02-08

## 2021-06-07 NOTE — PROGRESS NOTES
Patient is scheduled for a Depo-provera injection on Wednesday, but there is no current order.  Last office visit was 3/15/21.  Medication is pended if you approve.  Thank you.    Neema Marshall RN

## 2021-06-09 ENCOUNTER — ALLIED HEALTH/NURSE VISIT (OUTPATIENT)
Dept: ALLERGY | Facility: OTHER | Age: 36
End: 2021-06-09
Attending: FAMILY MEDICINE
Payer: COMMERCIAL

## 2021-06-09 DIAGNOSIS — Z30.42 ENCOUNTER FOR SURVEILLANCE OF INJECTABLE CONTRACEPTIVE: Primary | ICD-10-CM

## 2021-06-09 PROCEDURE — 96372 THER/PROPH/DIAG INJ SC/IM: CPT

## 2021-06-09 RX ADMIN — MEDROXYPROGESTERONE ACETATE 150 MG: 150 INJECTION, SUSPENSION INTRAMUSCULAR at 08:24

## 2021-06-09 NOTE — PROGRESS NOTES
Clinic Administered Medication Documentation      Depo Provera Documentation    URINE HCG: not indicated    Depo-Provera Standing Order inclusion/exclusion criteria reviewed.   Patient meets: inclusion criteria     BP: Data Unavailable  LAST PAP/EXAM:   Lab Results   Component Value Date    PAP NIL 09/04/2019       Prior to injection, verified patient identity using patient's name and date of birth. Medication was administered. Please see MAR and medication order for additional information.     Was entire vial of medication used? Yes  Vial/Syringe: Single dose vial  Expiration Date:  12/31/2022    Patient instructed to report any adverse reaction to staff immediately .  NEXT INJECTION DUE: 8/25/21 - 9/8/21     UJANIE Whitfield RN on 6/9/2021 at 8:24 AM

## 2021-06-12 ENCOUNTER — HEALTH MAINTENANCE LETTER (OUTPATIENT)
Age: 36
End: 2021-06-12

## 2021-06-21 DIAGNOSIS — G43.009 MIGRAINE WITHOUT AURA AND WITHOUT STATUS MIGRAINOSUS, NOT INTRACTABLE: ICD-10-CM

## 2021-06-21 DIAGNOSIS — R09.81 CHRONIC NASAL CONGESTION: ICD-10-CM

## 2021-06-21 RX ORDER — LORATADINE AND PSEUDOEPHEDRINE SULFATE 10; 240 MG/1; MG/1
TABLET, FILM COATED, EXTENDED RELEASE ORAL
Qty: 15 TABLET | Refills: 0 | Status: SHIPPED | OUTPATIENT
Start: 2021-06-21 | End: 2021-07-13

## 2021-06-21 NOTE — TELEPHONE ENCOUNTER
SM ALLERGY RELIEF D  MG 24 hr tablet      Last Written Prescription Date:  5/20/21  Last Fill Quantity: 15,   # refills: 1  Last Office Visit: 3/15/21  Future Office visit:       Routing refill request to provider for review/approval because:

## 2021-07-12 DIAGNOSIS — R09.81 CHRONIC NASAL CONGESTION: ICD-10-CM

## 2021-07-12 DIAGNOSIS — G43.009 MIGRAINE WITHOUT AURA AND WITHOUT STATUS MIGRAINOSUS, NOT INTRACTABLE: ICD-10-CM

## 2021-07-13 RX ORDER — LORATADINE AND PSEUDOEPHEDRINE SULFATE 10; 240 MG/1; MG/1
TABLET, FILM COATED, EXTENDED RELEASE ORAL
Qty: 15 TABLET | Refills: 0 | Status: SHIPPED | OUTPATIENT
Start: 2021-07-13 | End: 2021-07-14

## 2021-07-13 NOTE — TELEPHONE ENCOUNTER
SM LORATADINE D 24Hour TAB    Last Written Prescription Date:  6/21/21  Last Fill Quantity: 15,   # refills: 0  Last Office Visit: 3/15/21  Future Office visit:       Routing refill request to provider for review/approval because:

## 2021-07-14 ENCOUNTER — MYC REFILL (OUTPATIENT)
Dept: FAMILY MEDICINE | Facility: OTHER | Age: 36
End: 2021-07-14

## 2021-07-14 DIAGNOSIS — R09.81 CHRONIC NASAL CONGESTION: ICD-10-CM

## 2021-07-14 DIAGNOSIS — G43.009 MIGRAINE WITHOUT AURA AND WITHOUT STATUS MIGRAINOSUS, NOT INTRACTABLE: ICD-10-CM

## 2021-07-15 RX ORDER — LORATADINE AND PSEUDOEPHEDRINE SULFATE 10; 240 MG/1; MG/1
1 TABLET, FILM COATED, EXTENDED RELEASE ORAL DAILY
Qty: 15 TABLET | Refills: 0 | Status: SHIPPED | OUTPATIENT
Start: 2021-07-15 | End: 2021-08-11

## 2021-07-15 NOTE — TELEPHONE ENCOUNTER
Allergy relief      Last Written Prescription Date:  7/13/21  Last Fill Quantity: 15,   # refills: 0  Last Office Visit: 3/15/21  Future Office visit:

## 2021-08-11 DIAGNOSIS — G43.009 MIGRAINE WITHOUT AURA AND WITHOUT STATUS MIGRAINOSUS, NOT INTRACTABLE: ICD-10-CM

## 2021-08-11 DIAGNOSIS — R09.81 CHRONIC NASAL CONGESTION: ICD-10-CM

## 2021-08-11 RX ORDER — LORATADINE AND PSEUDOEPHEDRINE SULFATE 10; 240 MG/1; MG/1
1 TABLET, FILM COATED, EXTENDED RELEASE ORAL DAILY
Qty: 15 TABLET | Refills: 0 | Status: SHIPPED | OUTPATIENT
Start: 2021-08-11 | End: 2021-08-23

## 2021-08-11 NOTE — TELEPHONE ENCOUNTER
loratadine-pseudoePHEDrine (SM ALLERGY RELIEF D)  MG 24 hr tablet  Last Written Prescription Date:  7/15/21  Last Fill Quantity: 15,  # refills: 0   Last office visit: 3/15/2021   Future Office Visit:      Routing refill request to provider for review/approval because:  Drug not on the [facility] refill protocol

## 2021-08-23 DIAGNOSIS — G43.009 MIGRAINE WITHOUT AURA AND WITHOUT STATUS MIGRAINOSUS, NOT INTRACTABLE: ICD-10-CM

## 2021-08-23 DIAGNOSIS — R09.81 CHRONIC NASAL CONGESTION: ICD-10-CM

## 2021-08-23 RX ORDER — LORATADINE AND PSEUDOEPHEDRINE SULFATE 10; 240 MG/1; MG/1
1 TABLET, FILM COATED, EXTENDED RELEASE ORAL DAILY
Qty: 15 TABLET | Refills: 0 | Status: SHIPPED | OUTPATIENT
Start: 2021-08-23 | End: 2021-09-08

## 2021-08-23 NOTE — TELEPHONE ENCOUNTER
Allergy Relief       Last Written Prescription Date:  8/11/2021  Last Fill Quantity: 15,   # refills: 0  Last Office Visit: 3/15/2021  Future Office visit:

## 2021-08-27 ENCOUNTER — ALLIED HEALTH/NURSE VISIT (OUTPATIENT)
Dept: ALLERGY | Facility: OTHER | Age: 36
End: 2021-08-27
Attending: FAMILY MEDICINE
Payer: COMMERCIAL

## 2021-08-27 DIAGNOSIS — Z30.42 ENCOUNTER FOR SURVEILLANCE OF INJECTABLE CONTRACEPTIVE: Primary | ICD-10-CM

## 2021-08-27 PROCEDURE — 96372 THER/PROPH/DIAG INJ SC/IM: CPT

## 2021-08-27 RX ADMIN — MEDROXYPROGESTERONE ACETATE 150 MG: 150 INJECTION, SUSPENSION INTRAMUSCULAR at 08:06

## 2021-08-27 NOTE — PROGRESS NOTES
Clinic Administered Medication Documentation    Administrations This Visit     medroxyPROGESTERone (DEPO-PROVERA) injection 150 mg     Admin Date  08/27/2021 Action  Given Dose  150 mg Route  Intramuscular Site  Right Upper Outer Quadrant Administered By  Dillan Whitfield RN    Ordering Provider: Kendra Yanez MD    Patient Supplied?: No                  Depo Provera Documentation    URINE HCG: not indicated    Depo-Provera Standing Order inclusion/exclusion criteria reviewed.   Patient meets: inclusion criteria     BP: Data Unavailable  LAST PAP/EXAM:   Lab Results   Component Value Date    PAP NIL 09/04/2019       Prior to injection, verified patient identity using patient's name and date of birth. Medication was administered. Please see MAR and medication order for additional information.     Was entire vial of medication used? Yes  Vial/Syringe: Single dose vial  Expiration Date:  05/31/2023    Patient instructed to report any adverse reaction to staff immediately .    Dillan Whitfield RN on 8/27/2021 at 8:12 AM    NEXT INJECTION DUE: 11/12/21 - 11/26/21

## 2021-09-07 DIAGNOSIS — G43.009 MIGRAINE WITHOUT AURA AND WITHOUT STATUS MIGRAINOSUS, NOT INTRACTABLE: ICD-10-CM

## 2021-09-07 DIAGNOSIS — R09.81 CHRONIC NASAL CONGESTION: ICD-10-CM

## 2021-09-08 RX ORDER — LORATADINE AND PSEUDOEPHEDRINE SULFATE 10; 240 MG/1; MG/1
1 TABLET, FILM COATED, EXTENDED RELEASE ORAL DAILY
Qty: 15 TABLET | Refills: 0 | Status: SHIPPED | OUTPATIENT
Start: 2021-09-08 | End: 2021-09-22

## 2021-09-08 NOTE — TELEPHONE ENCOUNTER
sm allergy relief      Last Written Prescription Date:  8/23/21  Last Fill Quantity: 15,   # refills: 0  Last Office Visit: 3/15/21  Future Office visit:

## 2021-09-15 DIAGNOSIS — G43.009 MIGRAINE WITHOUT AURA AND WITHOUT STATUS MIGRAINOSUS, NOT INTRACTABLE: ICD-10-CM

## 2021-09-15 RX ORDER — SUMATRIPTAN 100 MG/1
TABLET, FILM COATED ORAL
Qty: 9 TABLET | Refills: 0 | Status: SHIPPED | OUTPATIENT
Start: 2021-09-15 | End: 2021-09-22

## 2021-09-15 NOTE — TELEPHONE ENCOUNTER
SUMAtriptan (IMITREX) 100 MG tablet     Last Written Prescription Date:  07/28/2021  Last Fill Quantity: 9,   # refills: 3  Last Office Visit: 03/15/2021  Future Office visit:       Routing refill request to provider for review/approval because:

## 2021-09-29 ENCOUNTER — TELEPHONE (OUTPATIENT)
Dept: FAMILY MEDICINE | Facility: OTHER | Age: 36
End: 2021-09-29

## 2021-09-29 NOTE — TELEPHONE ENCOUNTER
Received PA from [facility] for SUMAtriptan Succinate 100MG tablets. Submitted on CMM. Received instant response that Drug is covered by current benefit plan. NO PA NEEDED. 09/29/2021. Forms scanned to Epic.

## 2021-10-02 ENCOUNTER — HEALTH MAINTENANCE LETTER (OUTPATIENT)
Age: 36
End: 2021-10-02

## 2021-11-19 ENCOUNTER — ALLIED HEALTH/NURSE VISIT (OUTPATIENT)
Dept: FAMILY MEDICINE | Facility: OTHER | Age: 36
End: 2021-11-19
Attending: FAMILY MEDICINE
Payer: COMMERCIAL

## 2021-11-19 DIAGNOSIS — Z30.42 ENCOUNTER FOR SURVEILLANCE OF INJECTABLE CONTRACEPTIVE: Primary | ICD-10-CM

## 2021-11-19 PROCEDURE — 96372 THER/PROPH/DIAG INJ SC/IM: CPT

## 2021-11-19 RX ADMIN — MEDROXYPROGESTERONE ACETATE 150 MG: 150 INJECTION, SUSPENSION INTRAMUSCULAR at 08:05

## 2021-11-19 NOTE — PROGRESS NOTES
The following medication was given:     MEDICATION: Depo Provera 150mg  ROUTE: IM  SITE: [facility]  DOSE: 150mg  LOT #: [id number]   :  delmy  EXPIRATION DATE:  5/1/23  NDC#: 5241-5253-82

## 2021-11-20 DIAGNOSIS — R09.81 CHRONIC NASAL CONGESTION: ICD-10-CM

## 2021-11-20 DIAGNOSIS — G43.009 MIGRAINE WITHOUT AURA AND WITHOUT STATUS MIGRAINOSUS, NOT INTRACTABLE: ICD-10-CM

## 2021-11-22 RX ORDER — LORATADINE AND PSEUDOEPHEDRINE SULFATE 10; 240 MG/1; MG/1
TABLET, FILM COATED, EXTENDED RELEASE ORAL
Qty: 15 TABLET | Refills: 0 | Status: SHIPPED | OUTPATIENT
Start: 2021-11-22 | End: 2022-01-26

## 2021-11-22 NOTE — TELEPHONE ENCOUNTER
allergy      Last Written Prescription Date:  9/22/21  Last Fill Quantity: 15,   # refills: 3  Last Office Visit: 3/15/21  Future Office visit:

## 2021-12-06 DIAGNOSIS — R09.81 CHRONIC NASAL CONGESTION: ICD-10-CM

## 2021-12-06 DIAGNOSIS — G43.009 MIGRAINE WITHOUT AURA AND WITHOUT STATUS MIGRAINOSUS, NOT INTRACTABLE: ICD-10-CM

## 2021-12-09 RX ORDER — LORATADINE AND PSEUDOEPHEDRINE SULFATE 10; 240 MG/1; MG/1
TABLET, FILM COATED, EXTENDED RELEASE ORAL
Qty: 15 TABLET | Refills: 0 | OUTPATIENT
Start: 2021-12-09

## 2021-12-09 NOTE — TELEPHONE ENCOUNTER
Allergy relief      Last Written Prescription Date:  11/22/21  Last Fill Quantity: 15,   # refills: 0  Last Office Visit: 3/15/21  Future Office visit:       Routing refill request to provider for review/approval because:

## 2022-01-14 DIAGNOSIS — K21.9 GASTROESOPHAGEAL REFLUX DISEASE, UNSPECIFIED WHETHER ESOPHAGITIS PRESENT: ICD-10-CM

## 2022-01-17 RX ORDER — PANTOPRAZOLE SODIUM 40 MG/1
TABLET, DELAYED RELEASE ORAL
Qty: 30 TABLET | Refills: 0 | Status: SHIPPED | OUTPATIENT
Start: 2022-01-17 | End: 2022-01-26

## 2022-01-26 ENCOUNTER — VIRTUAL VISIT (OUTPATIENT)
Dept: FAMILY MEDICINE | Facility: OTHER | Age: 37
End: 2022-01-26
Attending: FAMILY MEDICINE
Payer: COMMERCIAL

## 2022-01-26 DIAGNOSIS — Z71.6 ENCOUNTER FOR TOBACCO USE CESSATION COUNSELING: ICD-10-CM

## 2022-01-26 DIAGNOSIS — G43.009 MIGRAINE WITHOUT AURA AND WITHOUT STATUS MIGRAINOSUS, NOT INTRACTABLE: Primary | ICD-10-CM

## 2022-01-26 DIAGNOSIS — Z13.220 LIPID SCREENING: ICD-10-CM

## 2022-01-26 DIAGNOSIS — R09.81 CHRONIC NASAL CONGESTION: ICD-10-CM

## 2022-01-26 DIAGNOSIS — F17.200 TOBACCO USE DISORDER: ICD-10-CM

## 2022-01-26 DIAGNOSIS — K21.9 GASTROESOPHAGEAL REFLUX DISEASE, UNSPECIFIED WHETHER ESOPHAGITIS PRESENT: ICD-10-CM

## 2022-01-26 DIAGNOSIS — G47.00 INSOMNIA, UNSPECIFIED TYPE: ICD-10-CM

## 2022-01-26 DIAGNOSIS — F41.9 ANXIETY: ICD-10-CM

## 2022-01-26 PROCEDURE — 99214 OFFICE O/P EST MOD 30 MIN: CPT | Mod: 95 | Performed by: FAMILY MEDICINE

## 2022-01-26 RX ORDER — VENLAFAXINE HYDROCHLORIDE 37.5 MG/1
37.5 CAPSULE, EXTENDED RELEASE ORAL DAILY
Qty: 90 CAPSULE | Refills: 0 | Status: SHIPPED | OUTPATIENT
Start: 2022-01-26 | End: 2022-02-11

## 2022-01-26 RX ORDER — PANTOPRAZOLE SODIUM 40 MG/1
40 TABLET, DELAYED RELEASE ORAL DAILY
Qty: 90 TABLET | Refills: 1 | Status: SHIPPED | OUTPATIENT
Start: 2022-01-26 | End: 2022-08-10

## 2022-01-26 RX ORDER — LORATADINE AND PSEUDOEPHEDRINE SULFATE 10; 240 MG/1; MG/1
1 TABLET, FILM COATED, EXTENDED RELEASE ORAL DAILY
Qty: 30 TABLET | Refills: 1 | Status: SHIPPED | OUTPATIENT
Start: 2022-01-26 | End: 2022-04-07

## 2022-01-26 RX ORDER — HYDROXYZINE PAMOATE 25 MG/1
25-50 CAPSULE ORAL 2 TIMES DAILY PRN
Qty: 60 CAPSULE | Refills: 3 | Status: SHIPPED | OUTPATIENT
Start: 2022-01-26 | End: 2022-04-22

## 2022-01-26 ASSESSMENT — ANXIETY QUESTIONNAIRES
5. BEING SO RESTLESS THAT IT IS HARD TO SIT STILL: MORE THAN HALF THE DAYS
2. NOT BEING ABLE TO STOP OR CONTROL WORRYING: NEARLY EVERY DAY
GAD7 TOTAL SCORE: 19
IF YOU CHECKED OFF ANY PROBLEMS ON THIS QUESTIONNAIRE, HOW DIFFICULT HAVE THESE PROBLEMS MADE IT FOR YOU TO DO YOUR WORK, TAKE CARE OF THINGS AT HOME, OR GET ALONG WITH OTHER PEOPLE: VERY DIFFICULT
4. TROUBLE RELAXING: NEARLY EVERY DAY
7. FEELING AFRAID AS IF SOMETHING AWFUL MIGHT HAPPEN: MORE THAN HALF THE DAYS
6. BECOMING EASILY ANNOYED OR IRRITABLE: NEARLY EVERY DAY
3. WORRYING TOO MUCH ABOUT DIFFERENT THINGS: NEARLY EVERY DAY
1. FEELING NERVOUS, ANXIOUS, OR ON EDGE: NEARLY EVERY DAY

## 2022-01-26 ASSESSMENT — PATIENT HEALTH QUESTIONNAIRE - PHQ9: SUM OF ALL RESPONSES TO PHQ QUESTIONS 1-9: 18

## 2022-01-26 NOTE — PATIENT INSTRUCTIONS
Reach out if needing assistance - housing, support, etc -  [facility] - clinic .    Trial of Effexor XR 37.5 mg daily for both anxiety/depression and migraine prevention.    Consider increasing to 75 mg daily after 2-3 weeks.  Please reach out.    Vistaril refilled as needed for anxiety/insomnia.    Consider counseling.    Continue protonix.    Refilled Claritin - D.  Consider ENT consult.  Prefer not to be on chronic decongestant.    Follow up in clinic 2 months - fasting labs and office visit - can be done same or separate days.  Water, medication, black coffee fine.    Smoking cessation advised.

## 2022-01-26 NOTE — PROGRESS NOTES
Aubree is a 36 year old who is being evaluated via a billable telephone visit.      What phone number would you like to be contacted at? [phone]  How would you like to obtain your AVS? MyChart    Assessment & Plan     Migraine without aura and without status migrainosus, not intractable  Not well controlled.  Lots of stressors currently.  Shift work, inadequate sleep patterns, etc.  Prior prophylaxis not effective as outline above.  Trial of Effexor XR for both migraines and mood.  - CBC with platelets and differential; Future  - Comprehensive metabolic panel (BMP + Alb, Alk Phos, ALT, AST, Total. Bili, TP); Future  - venlafaxine (EFFEXOR-XR) 37.5 MG 24 hr capsule; Take 1 capsule (37.5 mg) by mouth daily    Gastroesophageal reflux disease, unspecified whether esophagitis present  Stable with PPI - refilled.  - CBC with platelets and differential; Future  - pantoprazole (PROTONIX) 40 MG EC tablet; Take 1 tablet (40 mg) by mouth daily    Anxiety  As above.   Prozac has been helpful in the past.    However, needing migraine prophylaxis as well - see above - trial of Effexor XR initiated.  Encouraged counseling.  - Comprehensive metabolic panel (BMP + Alb, Alk Phos, ALT, AST, Total. Bili, TP); Future  - hydrOXYzine (VISTARIL) 25 MG capsule; Take 1-2 capsules (25-50 mg) by mouth 2 times daily as needed for anxiety or other (insomnia)  - venlafaxine (EFFEXOR-XR) 37.5 MG 24 hr capsule; Take 1 capsule (37.5 mg) by mouth daily    Insomnia, unspecified type  - hydrOXYzine (VISTARIL) 25 MG capsule; Take 1-2 capsules (25-50 mg) by mouth 2 times daily as needed for anxiety or other (insomnia)    Lipid screening  - Lipid Profile (Chol, Trig, HDL, LDL calc); Future    Tobacco use disorder  Cessation advised.  - CBC with platelets and differential; Future    Encounter for tobacco use cessation counseling    Chronic nasal congestion  prefer to not have her on chronic decongestant; consider ENT consult at follow up; limit use  "  Plan: loratadine-pseudoePHEDrine (ALLERGY         RELIEF/NASAL DECONGEST)  MG 24 hr tablet      27 minutes spent on the date of the encounter doing chart review, history and exam, documentation and further activities per the note       Tobacco Cessation:   reports that she has been smoking cigarettes. She has a 7.50 pack-year smoking history. She has never used smokeless tobacco.  Tobacco Cessation Action Plan: Information offered: Patient not interested at this time    BMI:   Estimated body mass index is 31.76 kg/m  as calculated from the following:    Height as of 3/5/21: 1.626 m (5' 4\").    Weight as of 3/19/21: 83.9 kg (185 lb).       Patient Instructions   Reach out if needing assistance - housing, support, etc -  [facility] - clinic .    Trial of Effexor XR 37.5 mg daily for both anxiety/depression and migraine prevention.    Consider increasing to 75 mg daily after 2-3 weeks.  Please reach out.    Vistaril refilled as needed for anxiety/insomnia.    Consider counseling.    Continue protonix.    Refilled Claritin - D.  Consider ENT consult.  Prefer not to be on chronic decongestant.    Follow up in clinic 2 months - fasting labs and office visit - can be done same or separate days.  Water, medication, black coffee fine.    Smoking cessation advised.        Return in about 2 months (around 3/26/2022) for depression/anxiety; fasting labs; migraines.    Kendra Bailey MD  [facility] - MONIQUE Mak is a 36 year old who presents for the following health issues     HPI     Last seen in office 3/2021 - vaginal symptoms.  Phone visit 11/2021.    Tobacco - ongoing    Anxiety Follow-Up    How are you doing with your anxiety since your last visit? Worsened -life situations    Are you having other symptoms that might be associated with anxiety? No    Have you had a significant life event? OTHER: recently moved mother into nursing home Declined this summer; " hospitalized 12/9/2021 - nursing home 12/13/2021 - [facility]; fighting it    Are you feeling depressed? Yes:  due to life situations    Do you have any concerns with your use of alcohol or other drugs? No     Prozac increased to 40 mg 11/2020; then went off it - has been off x year; went off after having gallbladder out; weaned    Shift work ongoing    Filed bankruptcy - had been living with mom in her house; declines assistance    Grandma passed this past Sunday    Declines counseling    Social History     Tobacco Use     Smoking status: Current Every Day Smoker     Packs/day: 0.50     Years: 15.00     Pack years: 7.50     Types: Cigarettes     Smokeless tobacco: Never Used     Tobacco comment: declines quitplan referral 3/19/21   Substance Use Topics     Alcohol use: No     Drug use: No     JESSICA-7 SCORE 9/6/2018 11/23/2020 1/7/2021   Total Score 0 0 0     PHQ 9/6/2018 11/23/2020 1/7/2021   PHQ-9 Total Score 0 0 1   Q9: Thoughts of better off dead/self-harm past 2 weeks Not at all Not at all Not at all     Last PHQ-9 1/26/2022   1.  Little interest or pleasure in doing things 3   2.  Feeling down, depressed, or hopeless 3   3.  Trouble falling or staying asleep, or sleeping too much 3   4.  Feeling tired or having little energy 3   5.  Poor appetite or overeating 2   6.  Feeling bad about yourself 1   7.  Trouble concentrating 3   8.  Moving slowly or restless 0   Q9: Thoughts of better off dead/self-harm past 2 weeks 0   PHQ-9 Total Score 18   Difficulty at work, home, or with people Very difficult     JESSICA-7  1/26/2022   1. Feeling nervous, anxious, or on edge 3   2. Not being able to stop or control worrying 3   3. Worrying too much about different things 3   4. Trouble relaxing 3   5. Being so restless that it is hard to sit still 2   6. Becoming easily annoyed or irritable 3   7. Feeling afraid, as if something awful might happen 2   JESSICA-7 Total Score 19   If you checked any problems, how difficult have  they made it for you to do your work, take care of things at home, or get along with other people? Very difficult       Migraine     Since your last clinic visit, how have your headaches changed?  Worsened    How often are you getting headaches or migraines? Daily     Are you able to do normal daily activities when you have a migraine? No    Are you taking rescue/relief medications? (Select all that apply) ibuprofen (Advil, Motrin) and naproxyn (Aleve)    How helpful is your rescue/relief medication?  I get some relief    Are you taking any medications to prevent migraines? (Select all that apply)  Other: Imitrex    In the past 4 weeks, how often have you gone to urgent care or the emergency room because of your headaches?  0     Propranolol prophylaxis- didn't help    Prior topamax, depakote, amitriptyline - not effective    Imitrex abortive agent, Compazine for nausea      How many servings of fruits and vegetables do you eat daily?  0-1    On average, how many sweetened beverages do you drink each day (Examples: soda, juice, sweet tea, etc.  Do NOT count diet or artificially sweetened beverages)?   2    How many days per week do you exercise enough to make your heart beat faster? 3 or less    How many minutes a day do you exercise enough to make your heart beat faster? 9 or less    How many days per week do you miss taking your medication? 0    GERD/Heartburn  Onset/Duration: Several years  Description: Heartburn  Intensity: moderate  Progression of Symptoms: same  Accompanying Signs & Symptoms:  Does it feel like food gets stuck or trouble swallowing: no  Nausea: no  Vomiting (bloody?): no  Abdominal Pain: no  Black-Tarry stools: no  Bloody stools: no  History:  Previous similar episodes: YES  Previous ulcers: no  Precipitating factors:   Caffeine use: YES  Alcohol use: no  NSAID/Aspirin use: YES  Tobacco use: YES  Worse with no particular food or drink.  Alleviating factors: None  Therapies tried and outcome:              Lifestyle changes: None            Medications: Protonix        Review of Systems   Constitutional, HEENT, cardiovascular, pulmonary, gi and gu systems are negative, except as otherwise noted.      Objective           Vitals:  No vitals were obtained today due to virtual visit.    Physical Exam   healthy, alert and no distress  PSYCH: Alert and oriented times 3; coherent speech, normal   rate and volume, able to articulate logical thoughts, able   to abstract reason, no tangential thoughts, no hallucinations   or delusions  Her affect is normal  RESP: No cough, no audible wheezing, able to talk in full sentences  Remainder of exam unable to be completed due to telephone visits            Phone call duration: 11:33 minutes

## 2022-01-27 ASSESSMENT — ANXIETY QUESTIONNAIRES: GAD7 TOTAL SCORE: 19

## 2022-02-08 ENCOUNTER — ALLIED HEALTH/NURSE VISIT (OUTPATIENT)
Dept: ALLERGY | Facility: OTHER | Age: 37
End: 2022-02-08
Attending: FAMILY MEDICINE
Payer: COMMERCIAL

## 2022-02-08 DIAGNOSIS — Z30.42 ENCOUNTER FOR SURVEILLANCE OF INJECTABLE CONTRACEPTIVE: Primary | ICD-10-CM

## 2022-02-08 PROCEDURE — 96372 THER/PROPH/DIAG INJ SC/IM: CPT

## 2022-02-08 RX ADMIN — MEDROXYPROGESTERONE ACETATE 150 MG: 150 INJECTION, SUSPENSION INTRAMUSCULAR at 08:16

## 2022-02-08 NOTE — PROGRESS NOTES
Clinic Administered Medication Documentation    Administrations This Visit     medroxyPROGESTERone (DEPO-PROVERA) injection 150 mg     Admin Date  02/08/2022 Action  Given Dose  150 mg Route  Intramuscular Site  Left Upper Outer Quadrant Administered By  Dillan Whitfield RN    Ordering Provider: Kendra Yanez MD    Patient Supplied?: No                  Depo Provera Documentation    URINE HCG: not indicated    Depo-Provera Standing Order inclusion/exclusion criteria reviewed.   Patient meets: inclusion criteria     BP: Data Unavailable  LAST PAP/EXAM:   Lab Results   Component Value Date    PAP NIL 09/04/2019       Prior to injection, verified patient identity using patient's name and date of birth. Medication was administered. Please see MAR and medication order for additional information.     Was entire vial of medication used? Yes  Vial/Syringe: Single dose vial  Expiration Date:  09/30/2023    Patient instructed to report any adverse reaction to staff immediately .  NEXT INJECTION DUE: 4/26/22 - 5/10/22    Dillan Whitfield RN on 2/8/2022 at 8:17 AM

## 2022-02-09 DIAGNOSIS — G43.009 MIGRAINE WITHOUT AURA AND WITHOUT STATUS MIGRAINOSUS, NOT INTRACTABLE: ICD-10-CM

## 2022-02-10 RX ORDER — SUMATRIPTAN 100 MG/1
TABLET, FILM COATED ORAL
Qty: 9 TABLET | Refills: 0 | Status: SHIPPED | OUTPATIENT
Start: 2022-02-10 | End: 2022-02-22

## 2022-02-10 NOTE — TELEPHONE ENCOUNTER
SUMATRIPTAN SUCC 100 MG TABLET  Last Written Prescription Date:  9/22/2021  Last Fill Quantity: 9,   # refills: 11  Last Office Visit: 1/26/22  Future Office visit:    Next 5 appointments (look out 90 days)    Feb 21, 2022 10:15 AM  Lab visit with HC LAB  [facility] ([facility] ) [address] [place] [address]  [phone]   Apr 01, 2022  8:15 AM  (Arrive by 8:00 AM)  SHORT with Kendra Yanez MD  [facility] ([facility] ) [address]  [place] [address]  [phone]           Routing refill request to provider for review/approval because:

## 2022-02-11 ENCOUNTER — MYC REFILL (OUTPATIENT)
Dept: FAMILY MEDICINE | Facility: OTHER | Age: 37
End: 2022-02-11
Payer: COMMERCIAL

## 2022-02-11 DIAGNOSIS — G43.009 MIGRAINE WITHOUT AURA AND WITHOUT STATUS MIGRAINOSUS, NOT INTRACTABLE: ICD-10-CM

## 2022-02-11 DIAGNOSIS — F41.9 ANXIETY: ICD-10-CM

## 2022-02-11 RX ORDER — VENLAFAXINE HYDROCHLORIDE 37.5 MG/1
37.5 CAPSULE, EXTENDED RELEASE ORAL DAILY
Qty: 90 CAPSULE | Refills: 0 | Status: SHIPPED | OUTPATIENT
Start: 2022-02-11 | End: 2022-02-14

## 2022-02-11 NOTE — TELEPHONE ENCOUNTER
effexor      Last Written Prescription Date:  1/26/22  Last Fill Quantity: 90,   # refills: 0  Last Office Visit: 1/26/22  Future Office visit:    Next 5 appointments (look out 90 days)    Feb 21, 2022 10:15 AM  Lab visit with  LAB  [facility] [address] [place] [address]  [phone]   Apr 01, 2022  8:15 AM  (Arrive by 8:00 AM)  SHORT with Kendra Yanez MD  [facility] ([facility] ) [address]  [place] [address]  [phone]

## 2022-02-14 DIAGNOSIS — F41.9 ANXIETY: ICD-10-CM

## 2022-02-14 DIAGNOSIS — G43.009 MIGRAINE WITHOUT AURA AND WITHOUT STATUS MIGRAINOSUS, NOT INTRACTABLE: ICD-10-CM

## 2022-02-14 RX ORDER — VENLAFAXINE HYDROCHLORIDE 75 MG/1
75 CAPSULE, EXTENDED RELEASE ORAL DAILY
Qty: 90 CAPSULE | Refills: 1 | Status: CANCELLED | OUTPATIENT
Start: 2022-02-14

## 2022-02-14 RX ORDER — VENLAFAXINE HYDROCHLORIDE 37.5 MG/1
37.5 CAPSULE, EXTENDED RELEASE ORAL 2 TIMES DAILY
Qty: 120 CAPSULE | Refills: 0 | Status: SHIPPED | OUTPATIENT
Start: 2022-02-14 | End: 2022-02-14

## 2022-02-14 NOTE — TELEPHONE ENCOUNTER
Pt called regarding mychart requesting dose increase.   Writer left patient a voicemail to please call back with triage number.   Does she want the venlafaxine 75 mg tablets? Take one daily? Please clarify.

## 2022-02-14 NOTE — TELEPHONE ENCOUNTER
2/14/2022 4:02 PM  Second attempt made to contact pt. See note below. Writer also sent my chart message.

## 2022-02-14 NOTE — TELEPHONE ENCOUNTER
Patient called stating she want's 37.5 mg 2 times a day.    venlafaxine (EFFEXOR-XR) 37.5 MG 24 hr capsule (Discontinued)      Last Written Prescription Date:  2/11/22-2/14-22  Last Fill Quantity: 90,   # refills: 0  Last Office Visit: 1/26/22 virtual  Future Office visit:    Next 5 appointments (look out 90 days)    Feb 21, 2022 10:15 AM  Lab visit with  LAB  [facility] ([facility] ) [address] [place] [address]  [phone]   Apr 01, 2022  8:15 AM  (Arrive by 8:00 AM)  SHORT with Kendra Yanez MD  [facility] ([facility] ) [address]  [place] [address]  [phone]           Routing refill request to provider for review/approval because:  Drug not on the [facility] or [facility] refill protocol or controlled substance

## 2022-02-16 ENCOUNTER — TELEPHONE (OUTPATIENT)
Dept: FAMILY MEDICINE | Facility: OTHER | Age: 37
End: 2022-02-16
Payer: COMMERCIAL

## 2022-02-16 DIAGNOSIS — G43.009 MIGRAINE WITHOUT AURA AND WITHOUT STATUS MIGRAINOSUS, NOT INTRACTABLE: Primary | ICD-10-CM

## 2022-02-16 DIAGNOSIS — F32.A DEPRESSION, UNSPECIFIED DEPRESSION TYPE: ICD-10-CM

## 2022-02-16 DIAGNOSIS — F41.9 ANXIETY: ICD-10-CM

## 2022-02-16 RX ORDER — VENLAFAXINE HYDROCHLORIDE 75 MG/1
75 CAPSULE, EXTENDED RELEASE ORAL DAILY
Qty: 90 CAPSULE | Refills: 1 | Status: SHIPPED | OUTPATIENT
Start: 2022-02-16 | End: 2022-09-14

## 2022-02-16 NOTE — TELEPHONE ENCOUNTER
Patient calling back and states she was advised to call back in two weeks and have effexor increased to 75 mg. Patient has been taking two capsules daily for about a week without any issues or concerns. Pended new script for 75 mg daily for approval.   Patient's LOV 01/26/22 virtual visit.   Please advise, thank you.

## 2022-02-16 NOTE — TELEPHONE ENCOUNTER
Left message for patient to return call in regards to Effexor dosage . Please clarify why patient is wanting a b.i.d. dosage of the 37.5 mg vs doing 75 mg daily .

## 2022-02-16 NOTE — TELEPHONE ENCOUNTER
Left message for patient to return call to clarify why she needs the b.i.d. dosing vs 75 mg daily  .

## 2022-02-17 PROBLEM — K21.9 GASTROESOPHAGEAL REFLUX DISEASE: Status: ACTIVE | Noted: 2017-04-07

## 2022-02-18 RX ORDER — VENLAFAXINE HYDROCHLORIDE 37.5 MG/1
37.5 CAPSULE, EXTENDED RELEASE ORAL 2 TIMES DAILY
Qty: 120 CAPSULE | Refills: 0 | OUTPATIENT
Start: 2022-02-18

## 2022-02-21 DIAGNOSIS — G43.009 MIGRAINE WITHOUT AURA AND WITHOUT STATUS MIGRAINOSUS, NOT INTRACTABLE: ICD-10-CM

## 2022-02-22 RX ORDER — SUMATRIPTAN 100 MG/1
TABLET, FILM COATED ORAL
Qty: 9 TABLET | Refills: 0 | Status: SHIPPED | OUTPATIENT
Start: 2022-02-22 | End: 2022-03-05

## 2022-02-22 NOTE — TELEPHONE ENCOUNTER
imitrex      Last Written Prescription Date:  2/10/22  Last Fill Quantity: 9,   # refills: 0  Last Office Visit: 1/26/22  Future Office visit:    Next 5 appointments (look out 90 days)    Feb 28, 2022  9:45 AM  Lab visit with [facility] LAB  [facility] ) [address] [place] [address]  [phone]   Apr 01, 2022  8:15 AM  (Arrive by 8:00 AM)  SHORT with Kendra Yanez MD  [facility] ([facility] ) [address]  [place] [address]  [phone]

## 2022-02-28 ENCOUNTER — LAB (OUTPATIENT)
Dept: LAB | Facility: OTHER | Age: 37
End: 2022-02-28
Payer: COMMERCIAL

## 2022-02-28 DIAGNOSIS — F17.200 TOBACCO USE DISORDER: ICD-10-CM

## 2022-02-28 DIAGNOSIS — Z13.220 LIPID SCREENING: ICD-10-CM

## 2022-02-28 DIAGNOSIS — F41.9 ANXIETY: ICD-10-CM

## 2022-02-28 DIAGNOSIS — K21.9 GASTROESOPHAGEAL REFLUX DISEASE, UNSPECIFIED WHETHER ESOPHAGITIS PRESENT: ICD-10-CM

## 2022-02-28 DIAGNOSIS — G43.009 MIGRAINE WITHOUT AURA AND WITHOUT STATUS MIGRAINOSUS, NOT INTRACTABLE: ICD-10-CM

## 2022-02-28 LAB
ALBUMIN SERPL-MCNC: 3.9 G/DL (ref 3.4–5)
ALP SERPL-CCNC: 62 U/L (ref 40–150)
ALT SERPL W P-5'-P-CCNC: 17 U/L (ref 0–50)
ANION GAP SERPL CALCULATED.3IONS-SCNC: 3 MMOL/L (ref 3–14)
AST SERPL W P-5'-P-CCNC: 10 U/L (ref 0–45)
BASOPHILS # BLD AUTO: 0.1 10E3/UL (ref 0–0.2)
BASOPHILS NFR BLD AUTO: 1 %
BILIRUB SERPL-MCNC: 0.4 MG/DL (ref 0.2–1.3)
BUN SERPL-MCNC: 15 MG/DL (ref 7–30)
CALCIUM SERPL-MCNC: 9.1 MG/DL (ref 8.5–10.1)
CHLORIDE BLD-SCNC: 109 MMOL/L (ref 94–109)
CHOLEST SERPL-MCNC: 155 MG/DL
CO2 SERPL-SCNC: 28 MMOL/L (ref 20–32)
CREAT SERPL-MCNC: 0.68 MG/DL (ref 0.52–1.04)
EOSINOPHIL # BLD AUTO: 0.2 10E3/UL (ref 0–0.7)
EOSINOPHIL NFR BLD AUTO: 2 %
ERYTHROCYTE [DISTWIDTH] IN BLOOD BY AUTOMATED COUNT: 12.8 % (ref 10–15)
FASTING STATUS PATIENT QL REPORTED: YES
GFR SERPL CREATININE-BSD FRML MDRD: >90 ML/MIN/1.73M2
GLUCOSE BLD-MCNC: 95 MG/DL (ref 70–99)
HCT VFR BLD AUTO: 41.2 % (ref 35–47)
HDLC SERPL-MCNC: 43 MG/DL
HGB BLD-MCNC: 14.1 G/DL (ref 11.7–15.7)
IMM GRANULOCYTES # BLD: 0 10E3/UL
IMM GRANULOCYTES NFR BLD: 0 %
LDLC SERPL CALC-MCNC: 85 MG/DL
LYMPHOCYTES # BLD AUTO: 4.6 10E3/UL (ref 0.8–5.3)
LYMPHOCYTES NFR BLD AUTO: 40 %
MCH RBC QN AUTO: 33.4 PG (ref 26.5–33)
MCHC RBC AUTO-ENTMCNC: 34.2 G/DL (ref 31.5–36.5)
MCV RBC AUTO: 98 FL (ref 78–100)
MONOCYTES # BLD AUTO: 0.6 10E3/UL (ref 0–1.3)
MONOCYTES NFR BLD AUTO: 5 %
NEUTROPHILS # BLD AUTO: 5.9 10E3/UL (ref 1.6–8.3)
NEUTROPHILS NFR BLD AUTO: 52 %
NONHDLC SERPL-MCNC: 112 MG/DL
NRBC # BLD AUTO: 0 10E3/UL
NRBC BLD AUTO-RTO: 0 /100
PLATELET # BLD AUTO: 372 10E3/UL (ref 150–450)
POTASSIUM BLD-SCNC: 4 MMOL/L (ref 3.4–5.3)
PROT SERPL-MCNC: 6.8 G/DL (ref 6.8–8.8)
RBC # BLD AUTO: 4.22 10E6/UL (ref 3.8–5.2)
SODIUM SERPL-SCNC: 140 MMOL/L (ref 133–144)
TRIGL SERPL-MCNC: 134 MG/DL
WBC # BLD AUTO: 11.3 10E3/UL (ref 4–11)

## 2022-02-28 PROCEDURE — 85025 COMPLETE CBC W/AUTO DIFF WBC: CPT | Mod: ZL

## 2022-02-28 PROCEDURE — 80053 COMPREHEN METABOLIC PANEL: CPT | Mod: ZL

## 2022-02-28 PROCEDURE — 80061 LIPID PANEL: CPT | Mod: ZL

## 2022-02-28 PROCEDURE — 36415 COLL VENOUS BLD VENIPUNCTURE: CPT | Mod: ZL

## 2022-03-03 DIAGNOSIS — G43.009 MIGRAINE WITHOUT AURA AND WITHOUT STATUS MIGRAINOSUS, NOT INTRACTABLE: ICD-10-CM

## 2022-03-04 NOTE — TELEPHONE ENCOUNTER
Imitrex      Last Written Prescription Date:  2/22/22  Last Fill Quantity: 9,   # refills: 0  Last Office Visit: 1/26/22  Future Office visit:    Next 5 appointments (look out 90 days)    Apr 01, 2022  8:15 AM  (Arrive by 8:00 AM)  SHORT with Kendra Yanez MD  [facility] [place] ([facility] [place] ) [address]  [place] [address]  [phone]           Routing refill request to provider for review/approval because:

## 2022-03-05 RX ORDER — SUMATRIPTAN 100 MG/1
TABLET, FILM COATED ORAL
Qty: 9 TABLET | Refills: 0 | Status: SHIPPED | OUTPATIENT
Start: 2022-03-05 | End: 2022-03-23

## 2022-03-23 ENCOUNTER — MYC REFILL (OUTPATIENT)
Dept: FAMILY MEDICINE | Facility: OTHER | Age: 37
End: 2022-03-23
Payer: COMMERCIAL

## 2022-03-23 DIAGNOSIS — G43.009 MIGRAINE WITHOUT AURA AND WITHOUT STATUS MIGRAINOSUS, NOT INTRACTABLE: ICD-10-CM

## 2022-03-23 RX ORDER — SUMATRIPTAN 100 MG/1
TABLET, FILM COATED ORAL
Qty: 9 TABLET | Refills: 0 | OUTPATIENT
Start: 2022-03-23

## 2022-04-06 DIAGNOSIS — G43.009 MIGRAINE WITHOUT AURA AND WITHOUT STATUS MIGRAINOSUS, NOT INTRACTABLE: ICD-10-CM

## 2022-04-06 DIAGNOSIS — R09.81 CHRONIC NASAL CONGESTION: ICD-10-CM

## 2022-04-07 RX ORDER — LORATADINE AND PSEUDOEPHEDRINE SULFATE 10; 240 MG/1; MG/1
TABLET, FILM COATED, EXTENDED RELEASE ORAL
Qty: 30 TABLET | Refills: 0 | Status: SHIPPED | OUTPATIENT
Start: 2022-04-07 | End: 2022-05-06

## 2022-04-07 NOTE — TELEPHONE ENCOUNTER
LORATADINE D      Last Written Prescription Date:  1-  Last Fill Quantity: 30,   # refills: 1  Last Office Visit: 1-  Future Office visit:    Next 5 appointments (look out 90 days)    Apr 22, 2022  9:15 AM  (Arrive by 9:00 AM)  SHORT with Kendra Yanez MD  [facility] ([facility] ) [address]  [place] [address]  [phone]           Routing refill request to provider for review/approval because:  Drug not on the [facility], [facility] or [facility] refill protocol or controlled substance

## 2022-04-21 NOTE — PROGRESS NOTES
Assessment & Plan     Migraine without aura and without status migrainosus, not intractable  Some improvement with Effexor XR.  Consider increase in the future.  Insomnia is main trigger now.  See below.    Anxiety  Stable.  - hydrOXYzine (VISTARIL) 25 MG capsule; Take 1-2 capsules (25-50 mg) by mouth 2 times daily as needed for anxiety or other (insomnia)    Insomnia, unspecified type  Will increase Vistaril dose to  mg.  Sleep hygiene discussed as well.  Shift work makes it very difficult.  - hydrOXYzine (VISTARIL) 25 MG capsule; Take 1-2 capsules (25-50 mg) by mouth 2 times daily as needed for anxiety or other (insomnia)    Ingrown toenail of both feet  Referral placed.  - Orthopedic  Referral; Future     There are no Patient Instructions on file for this visit.    No follow-ups on file.    Kendra Bailey MD  [facility] - [place] Mak is a 36 year old who presents for the following health issues     HPI     Depression and Anxiety Follow-Up - Effexor XR dose increased for mood and migraine prevention    How are you doing with your depression since your last visit? Improved     How are you doing with your anxiety since your last visit?  Improved     Are you having other symptoms that might be associated with depression or anxiety? No    Have you had a significant life event? Grief or Loss     Do you have any concerns with your use of alcohol or other drugs? No     Less stressed    Grandma,mom, and brother-in-law all passsed    Other people are noticing improved mood    Cooked easter dinner for dad    Declines counseling    Moving in July - [facility]    No side effects for Effexor XR    Social History     Tobacco Use     Smoking status: Current Every Day Smoker     Packs/day: 0.50     Years: 15.00     Pack years: 7.50     Types: Cigarettes     Smokeless tobacco: Never Used     Tobacco comment: declines quitplan referral 3/19/21   Vaping Use     Vaping Use: Never  used   Substance Use Topics     Alcohol use: No     Drug use: No     PHQ 1/7/2021 1/26/2022 4/22/2022   PHQ-9 Total Score 1 18 8   Q9: Thoughts of better off dead/self-harm past 2 weeks Not at all Not at all Not at all     JESSICA-7 SCORE 1/7/2021 1/26/2022 4/22/2022   Total Score 0 19 4     Last PHQ-9 4/22/2022   1.  Little interest or pleasure in doing things 0   2.  Feeling down, depressed, or hopeless 1   3.  Trouble falling or staying asleep, or sleeping too much 3   4.  Feeling tired or having little energy 2   5.  Poor appetite or overeating 2   6.  Feeling bad about yourself 0   7.  Trouble concentrating 0   8.  Moving slowly or restless 0   Q9: Thoughts of better off dead/self-harm past 2 weeks 0   PHQ-9 Total Score 8   Difficulty at work, home, or with people Very difficult     JESSICA-7  4/22/2022   1. Feeling nervous, anxious, or on edge 0   2. Not being able to stop or control worrying 0   3. Worrying too much about different things 0   4. Trouble relaxing 2   5. Being so restless that it is hard to sit still 2   6. Becoming easily annoyed or irritable 0   7. Feeling afraid, as if something awful might happen 0   JESSICA-7 Total Score 4   If you checked any problems, how difficult have they made it for you to do your work, take care of things at home, or get along with other people? Very difficult       Suicide Assessment Five-step Evaluation and Treatment (SAFE-T)    Migraine     Since your last clinic visit, how have your headaches changed?  Improved    How often are you getting headaches or migraines? 3-4 times a week     Are you able to do normal daily activities when you have a migraine? Yes    Are you taking rescue/relief medications? (Select all that apply) sumatriptan (Imitrex)    How helpful is your rescue/relief medication?  I get total relief    Are you taking any medications to prevent migraines? (Select all that apply)  Other: venlafaxine    In the past 4 weeks, how often have you gone to urgent care  or the emergency room because of your headaches?  0     Ongoing issues with falling/staying asleep    Vistaril BID - 25 mg    Works midnights    Bilateral ingrown toenails - has had removed before; growing back; gets painful; no active infection, drainage    Review of Systems   Constitutional, HEENT, cardiovascular, pulmonary, gi and gu systems are negative, except as otherwise noted.      Objective    /87 (BP Location: Right arm, Patient Position: Sitting, Cuff Size: Adult Large)   Pulse 100   Temp 98.1  F (36.7  C) (Tympanic)   Resp 16   Wt 87.5 kg (193 lb)   SpO2 98%   BMI 33.13 kg/m    Body mass index is 33.13 kg/m .  Physical Exam   GENERAL: healthy, alert and no distress  NECK: no adenopathy, no asymmetry, masses, or scars and thyroid normal to palpation  RESP: lungs clear to auscultation - no rales, rhonchi or wheezes  CV: regular rate and rhythm, normal S1 S2, no S3 or S4, no murmur, click or rub, no peripheral edema and peripheral pulses strong  MS: normal muscle tone, no edema, peripheral pulses normal and bilateral great toenails partially growing back; no drainage; no erythema; mild tenderness  SKIN: no suspicious lesions or rashes  PSYCH: mentation appears normal, affect normal/bright

## 2022-04-22 ENCOUNTER — OFFICE VISIT (OUTPATIENT)
Dept: FAMILY MEDICINE | Facility: OTHER | Age: 37
End: 2022-04-22
Attending: FAMILY MEDICINE
Payer: COMMERCIAL

## 2022-04-22 VITALS
HEART RATE: 100 BPM | OXYGEN SATURATION: 98 % | BODY MASS INDEX: 33.13 KG/M2 | WEIGHT: 193 LBS | SYSTOLIC BLOOD PRESSURE: 118 MMHG | RESPIRATION RATE: 16 BRPM | DIASTOLIC BLOOD PRESSURE: 87 MMHG | TEMPERATURE: 98.1 F

## 2022-04-22 DIAGNOSIS — L60.0 INGROWN TOENAIL OF BOTH FEET: ICD-10-CM

## 2022-04-22 DIAGNOSIS — G47.00 INSOMNIA, UNSPECIFIED TYPE: ICD-10-CM

## 2022-04-22 DIAGNOSIS — F41.9 ANXIETY: ICD-10-CM

## 2022-04-22 DIAGNOSIS — G43.009 MIGRAINE WITHOUT AURA AND WITHOUT STATUS MIGRAINOSUS, NOT INTRACTABLE: Primary | ICD-10-CM

## 2022-04-22 PROCEDURE — 99214 OFFICE O/P EST MOD 30 MIN: CPT | Performed by: FAMILY MEDICINE

## 2022-04-22 PROCEDURE — G0463 HOSPITAL OUTPT CLINIC VISIT: HCPCS | Performed by: FAMILY MEDICINE

## 2022-04-22 RX ORDER — PROPRANOLOL HYDROCHLORIDE 120 MG/1
CAPSULE, EXTENDED RELEASE ORAL
COMMUNITY
Start: 2021-07-19 | End: 2022-04-22

## 2022-04-22 RX ORDER — HYDROXYZINE PAMOATE 25 MG/1
25-50 CAPSULE ORAL 2 TIMES DAILY PRN
Qty: 180 CAPSULE | Refills: 3 | Status: SHIPPED | OUTPATIENT
Start: 2022-04-22 | End: 2023-02-22

## 2022-04-22 ASSESSMENT — ANXIETY QUESTIONNAIRES
5. BEING SO RESTLESS THAT IT IS HARD TO SIT STILL: MORE THAN HALF THE DAYS
6. BECOMING EASILY ANNOYED OR IRRITABLE: NOT AT ALL
IF YOU CHECKED OFF ANY PROBLEMS ON THIS QUESTIONNAIRE, HOW DIFFICULT HAVE THESE PROBLEMS MADE IT FOR YOU TO DO YOUR WORK, TAKE CARE OF THINGS AT HOME, OR GET ALONG WITH OTHER PEOPLE: VERY DIFFICULT
1. FEELING NERVOUS, ANXIOUS, OR ON EDGE: NOT AT ALL
4. TROUBLE RELAXING: MORE THAN HALF THE DAYS
GAD7 TOTAL SCORE: 4
3. WORRYING TOO MUCH ABOUT DIFFERENT THINGS: NOT AT ALL
7. FEELING AFRAID AS IF SOMETHING AWFUL MIGHT HAPPEN: NOT AT ALL
2. NOT BEING ABLE TO STOP OR CONTROL WORRYING: NOT AT ALL

## 2022-04-22 ASSESSMENT — PAIN SCALES - GENERAL: PAINLEVEL: NO PAIN (0)

## 2022-04-22 ASSESSMENT — PATIENT HEALTH QUESTIONNAIRE - PHQ9: SUM OF ALL RESPONSES TO PHQ QUESTIONS 1-9: 8

## 2022-04-22 NOTE — NURSING NOTE
"Chief Complaint   Patient presents with     Recheck Medication       Initial /87 (BP Location: Right arm, Patient Position: Sitting, Cuff Size: Adult Large)   Pulse 100   Temp 98.1  F (36.7  C) (Tympanic)   Resp 16   Wt 87.5 kg (193 lb)   SpO2 98%   BMI 33.13 kg/m   Estimated body mass index is 33.13 kg/m  as calculated from the following:    Height as of 3/5/21: 1.626 m (5' 4\").    Weight as of this encounter: 87.5 kg (193 lb).  Medication Reconciliation: complete  Connor Arriaza LPN  "

## 2022-04-22 NOTE — PATIENT INSTRUCTIONS
Continue Effexor XR at current dose.    Increase Vistaril to  mg for sleep.  If needing the 100 mg dose, can send new script.    Referral to podiatry for toenail removal.

## 2022-04-23 ASSESSMENT — ANXIETY QUESTIONNAIRES: GAD7 TOTAL SCORE: 4

## 2022-05-04 ENCOUNTER — TELEPHONE (OUTPATIENT)
Dept: FAMILY MEDICINE | Facility: OTHER | Age: 37
End: 2022-05-04
Payer: COMMERCIAL

## 2022-05-04 DIAGNOSIS — Z30.42 ENCOUNTER FOR SURVEILLANCE OF INJECTABLE CONTRACEPTIVE: Primary | ICD-10-CM

## 2022-05-04 RX ORDER — MEDROXYPROGESTERONE ACETATE 150 MG/ML
150 INJECTION, SUSPENSION INTRAMUSCULAR
Status: ACTIVE | OUTPATIENT
Start: 2022-05-09 | End: 2023-05-03

## 2022-05-09 ENCOUNTER — ALLIED HEALTH/NURSE VISIT (OUTPATIENT)
Dept: ALLERGY | Facility: OTHER | Age: 37
End: 2022-05-09
Attending: FAMILY MEDICINE
Payer: COMMERCIAL

## 2022-05-09 DIAGNOSIS — Z30.42 ENCOUNTER FOR SURVEILLANCE OF INJECTABLE CONTRACEPTIVE: Primary | ICD-10-CM

## 2022-05-09 PROCEDURE — 96372 THER/PROPH/DIAG INJ SC/IM: CPT

## 2022-05-09 RX ADMIN — MEDROXYPROGESTERONE ACETATE 150 MG: 150 INJECTION, SUSPENSION INTRAMUSCULAR at 08:10

## 2022-05-09 NOTE — PROGRESS NOTES
Clinic Administered Medication Documentation    Administrations This Visit     medroxyPROGESTERone (DEPO-PROVERA) injection 150 mg     Admin Date  05/09/2022 Action  Given Dose  150 mg Route  Intramuscular Site  Right Upper Outer Quadrant Administered By  Dillan Whitfield RN    Ordering Provider: Kendra Yanez MD    Patient Supplied?: No                  Depo Provera Documentation    URINE HCG: not indicated    Depo-Provera Standing Order inclusion/exclusion criteria reviewed.   Patient meets: inclusion criteria     BP: Data Unavailable  LAST PAP/EXAM:   Lab Results   Component Value Date    PAP NIL 09/04/2019       Prior to injection, verified patient identity using patient's name and date of birth. Medication was administered. Please see MAR and medication order for additional information.     Was entire vial of medication used? Yes  Vial/Syringe: Single dose vial  Expiration Date:  01/31/2024    Patient instructed to report any adverse reaction to staff immediately .  NEXT INJECTION DUE: 7/25/22 - 8/8/22    Dillan Whitfield RN on 5/9/2022 at 8:11 AM

## 2022-05-19 DIAGNOSIS — G43.009 MIGRAINE WITHOUT AURA AND WITHOUT STATUS MIGRAINOSUS, NOT INTRACTABLE: ICD-10-CM

## 2022-05-20 ENCOUNTER — OFFICE VISIT (OUTPATIENT)
Dept: PODIATRY | Facility: OTHER | Age: 37
End: 2022-05-20
Attending: FAMILY MEDICINE
Payer: COMMERCIAL

## 2022-05-20 VITALS
SYSTOLIC BLOOD PRESSURE: 132 MMHG | TEMPERATURE: 98 F | OXYGEN SATURATION: 97 % | HEART RATE: 99 BPM | DIASTOLIC BLOOD PRESSURE: 89 MMHG

## 2022-05-20 DIAGNOSIS — L60.3 ONYCHODYSTROPHY: ICD-10-CM

## 2022-05-20 DIAGNOSIS — L60.0 INGROWN TOENAIL OF BOTH FEET: Primary | ICD-10-CM

## 2022-05-20 PROCEDURE — G0463 HOSPITAL OUTPT CLINIC VISIT: HCPCS | Mod: 25

## 2022-05-20 PROCEDURE — 87106 FUNGI IDENTIFICATION YEAST: CPT | Mod: ZL | Performed by: PODIATRIST

## 2022-05-20 PROCEDURE — 99203 OFFICE O/P NEW LOW 30 MIN: CPT | Performed by: PODIATRIST

## 2022-05-20 ASSESSMENT — PAIN SCALES - GENERAL: PAINLEVEL: NO PAIN (0)

## 2022-05-20 NOTE — NURSING NOTE
"Chief Complaint   Patient presents with     Ingrown Toenail     bilateral       Initial /89 (BP Location: Left arm, Patient Position: Sitting, Cuff Size: Adult Regular)   Pulse 99   Temp 98  F (36.7  C) (Tympanic)   SpO2 97%  Estimated body mass index is 33.13 kg/m  as calculated from the following:    Height as of 3/5/21: 1.626 m (5' 4\").    Weight as of 4/22/22: 87.5 kg (193 lb).  Medication Reconciliation: tee Mckeon  "

## 2022-05-20 NOTE — LETTER
5/20/2022         RE: Aubree Corcoran  [address] [facility] [address]        Dear Colleague,    Thank you for referring your patient, uAbree Corcoran, to the [facility]. Please see a copy of my visit note below.    Chief complaint: Patient presents with:  Ingrown Toenail: bilateral      History of Present Illness: This 36 year old female is seen at the request of Dr. Yanez for evaluation and suggestions of management of bilateral hallux ingrown toenails. She had both toenails removed in 2016 and both borders grew back. She says they do not cause her consistent pain, but they catch on her socks so she has to keep them trimmed. She would like to discuss treatment options for the regrowth because they do sometimes cause her pain.    No further pedal complaints today.       /89 (BP Location: Left arm, Patient Position: Sitting, Cuff Size: Adult Regular)   Pulse 99   Temp 98  F (36.7  C) (Tympanic)   SpO2 97%     Patient Active Problem List   Diagnosis     ACP (advance care planning)     Tobacco use disorder     Migraine without aura and without status migrainosus, not intractable     Gastroesophageal reflux disease, esophagitis presence not specified     Irritable bowel syndrome with both constipation and diarrhea     Family history of heart disease in male family member before age 55     Single current episode of major depressive disorder, unspecified depression episode severity     Anxiety     Insomnia, unspecified type     Acute cholecystitis     Headache disorder     Meningitis, unspecified(322.9)     Migraine headache     Family planning     Family history of cardiac disorder     Irritable bowel syndrome     Tobacco user       Past Surgical History:   Procedure Laterality Date     LAPAROSCOPIC CHOLECYSTECTOMY N/A 2/27/2021    Procedure: LAPAROSCOPIC CHOLECYSTECTOMY,;  Surgeon: Delmar French DO;  Location: [facility]     ORTHOPEDIC SURGERY  2002    ankle surgery- bone spur  removal       Current Outpatient Medications   Medication     hydrOXYzine (VISTARIL) 25 MG capsule     pantoprazole (PROTONIX) 40 MG EC tablet     prochlorperazine (COMPAZINE) 5 MG tablet     SM ALLERGY RELIEF D  MG 24 hr tablet     SUMAtriptan (IMITREX) 100 MG tablet     venlafaxine (EFFEXOR-XR) 75 MG 24 hr capsule     Current Facility-Administered Medications   Medication     medroxyPROGESTERone (DEPO-PROVERA) injection 150 mg          Allergies   Allergen Reactions     Linaclotide      diarrhea       Family History   Problem Relation Age of Onset     Heart Disease Mother         8 stents; onset 40s?     Chronic Obstructive Pulmonary Disease Mother      Cancer Father         mouth and throat cancer     Heart Disease Brother         stents; age 37; occlusive disease     Bladder Cancer Brother        Social History     Socioeconomic History     Marital status: Single   Tobacco Use     Smoking status: Current Every Day Smoker     Packs/day: 0.50     Years: 15.00     Pack years: 7.50     Types: Cigarettes     Smokeless tobacco: Never Used     Tobacco comment: declines quitplan referral 3/19/21   Vaping Use     Vaping Use: Never used   Substance and Sexual Activity     Alcohol use: No     Drug use: No     Sexual activity: Never     Birth control/protection: Injection   Other Topics Concern     Parent/sibling w/ CABG, MI or angioplasty before 65F 55M? Yes       ROS: 10 point ROS neg other than the symptoms noted above in the HPI.  EXAM  Constitutional: healthy, alert and no distress    Psychiatric: mentation appears normal and affect normal/bright    VASCULAR:  -Dorsalis pedis pulse +2/4 b/l  -Posterior tibial pulse +1/4 b/l  -Capillary refill time < 3 seconds to b/l hallux  NEURO:  -Light touch sensation intact to b/l plantar forefoot  DERM:  -Skin temperature, texture and turgor WNL b/l  -Toenails elongated, thickened, dystrophic and discolored x 10  -Mild incurvation to the bilateral border of the bilateral  hallux  ---No erythema and no edema to the nail border  ---No open wounds and no drainage  ---LEFT hallux has bilateral nail spicule regrowths and RIGHT hallux toenail is mildly thickened and dystrophic  ---No severe erythema, no ascending erythema, no calor, no purulence, no malodor, no other SOI.  MSK:  -Muscle strength of ankles +5/5 for dorsiflexion, plantarflexion, ABDUction and ADDuction b/l    ============================================================    ASSESSMENT:  (L60.0) Ingrown toenail of both feet      PLAN:  -Patient evaluated and examined. Treatment options discussed with no educational barriers noted.  --Discussed nail procedure options and etiologies and treatments for ingrown toenails. Conservatively, patient could opt for a slant back today and keep monitoring the toe since there are no SOI. Discussed risks and benefits and healing course of a nail border avulsion vs. Matrixectomy including post procedure infection or a non-healing wound, both of which could lead to a life threatening infection or amputation of the foot or leg or a proximal amputation. Patient understands the risks and benefits and has decided to proceed with a bilateral hallux toenail matrixectomy, but she wants to do this in mid June when she has some time off of work.    Onychodystrophy  -Discussed etiologies and treatment options for onychodystrophy. There may be fungi in the toenail, but it is not known until a nail culture is performed. Treatment options consist of leaving the toenail as is, treating the nail for fungi (culture would be taken today to confirm nail fungi), a nail avulsion and treating the fungi as the nail grows back in, or removing the toenail permanently. The oral medication can sometimes fluctuate liver values so the patient would need to get a liver blood draw before, during and after the 90 day treatment. Topical anti-fungals can be used, but they do not always full cure the toenail fungi and they must  be used daily and sometimes for 6-12 months before noticing a difference in the toenail. Recurrence rate of toenail fungi is high. After reviewing risks and benefits, patient has decided to proceed with possible treatment if the results are positive for fungi.  ---Toenail fungal culture obtained on 05/20/2022. It can take between 24 hours and 30 days for the final results. Patient understands this time frame. The patient will be called with the results once they are positive or at the end of the 30 day period once the final results comes back negative.    -Patient in agreement with the above treatment plan and all of patient's questions were answered.      Return to clinic one month for bilateral hallux toenail matrixectomy        Mindi Lemus DPM, QUIN      Again, thank you for allowing me to participate in the care of your patient.        Sincerely,        Mindi Lemus DPM

## 2022-05-20 NOTE — PROGRESS NOTES
Chief complaint: Patient presents with:  Ingrown Toenail: bilateral      History of Present Illness: This 36 year old female is seen at the request of Dr. Yanez for evaluation and suggestions of management of bilateral hallux ingrown toenails. She had both toenails removed in 2016 and both borders grew back. She says they do not cause her consistent pain, but they catch on her socks so she has to keep them trimmed. She would like to discuss treatment options for the regrowth because they do sometimes cause her pain.    No further pedal complaints today.       /89 (BP Location: Left arm, Patient Position: Sitting, Cuff Size: Adult Regular)   Pulse 99   Temp 98  F (36.7  C) (Tympanic)   SpO2 97%     Patient Active Problem List   Diagnosis     ACP (advance care planning)     Tobacco use disorder     Migraine without aura and without status migrainosus, not intractable     Gastroesophageal reflux disease, esophagitis presence not specified     Irritable bowel syndrome with both constipation and diarrhea     Family history of heart disease in male family member before age 55     Single current episode of major depressive disorder, unspecified depression episode severity     Anxiety     Insomnia, unspecified type     Acute cholecystitis     Headache disorder     Meningitis, unspecified(322.9)     Migraine headache     Family planning     Family history of cardiac disorder     Irritable bowel syndrome     Tobacco user       Past Surgical History:   Procedure Laterality Date     LAPAROSCOPIC CHOLECYSTECTOMY N/A 2/27/2021    Procedure: LAPAROSCOPIC CHOLECYSTECTOMY,;  Surgeon: Delmar French DO;  Location: [facility]     ORTHOPEDIC SURGERY  2002    ankle surgery- bone spur removal       Current Outpatient Medications   Medication     hydrOXYzine (VISTARIL) 25 MG capsule     pantoprazole (PROTONIX) 40 MG EC tablet     prochlorperazine (COMPAZINE) 5 MG tablet     SM ALLERGY RELIEF D  MG 24 hr tablet      SUMAtriptan (IMITREX) 100 MG tablet     venlafaxine (EFFEXOR-XR) 75 MG 24 hr capsule     Current Facility-Administered Medications   Medication     medroxyPROGESTERone (DEPO-PROVERA) injection 150 mg          Allergies   Allergen Reactions     Linaclotide      diarrhea       Family History   Problem Relation Age of Onset     Heart Disease Mother         8 stents; onset 40s?     Chronic Obstructive Pulmonary Disease Mother      Cancer Father         mouth and throat cancer     Heart Disease Brother         stents; age 37; occlusive disease     Bladder Cancer Brother        Social History     Socioeconomic History     Marital status: Single   Tobacco Use     Smoking status: Current Every Day Smoker     Packs/day: 0.50     Years: 15.00     Pack years: 7.50     Types: Cigarettes     Smokeless tobacco: Never Used     Tobacco comment: declines quitplan referral 3/19/21   Vaping Use     Vaping Use: Never used   Substance and Sexual Activity     Alcohol use: No     Drug use: No     Sexual activity: Never     Birth control/protection: Injection   Other Topics Concern     Parent/sibling w/ CABG, MI or angioplasty before 65F 55M? Yes       ROS: 10 point ROS neg other than the symptoms noted above in the HPI.  EXAM  Constitutional: healthy, alert and no distress    Psychiatric: mentation appears normal and affect normal/bright    VASCULAR:  -Dorsalis pedis pulse +2/4 b/l  -Posterior tibial pulse +1/4 b/l  -Capillary refill time < 3 seconds to b/l hallux  NEURO:  -Light touch sensation intact to b/l plantar forefoot  DERM:  -Skin temperature, texture and turgor WNL b/l  -Toenails elongated, thickened, dystrophic and discolored x 10  -Mild incurvation to the bilateral border of the bilateral hallux  ---No erythema and no edema to the nail border  ---No open wounds and no drainage  ---LEFT hallux has bilateral nail spicule regrowths and RIGHT hallux toenail is mildly thickened and dystrophic  ---No severe erythema, no ascending  erythema, no calor, no purulence, no malodor, no other SOI.  MSK:  -Muscle strength of ankles +5/5 for dorsiflexion, plantarflexion, ABDUction and ADDuction b/l    ============================================================    ASSESSMENT:  (L60.0) Ingrown toenail of both feet    (L60.3) Onychodystrophy      PLAN:  -Patient evaluated and examined. Treatment options discussed with no educational barriers noted.  --Discussed nail procedure options and etiologies and treatments for ingrown toenails. Conservatively, patient could opt for a slant back today and keep monitoring the toe since there are no SOI. Discussed risks and benefits and healing course of a nail border avulsion vs. Matrixectomy including post procedure infection or a non-healing wound, both of which could lead to a life threatening infection or amputation of the foot or leg or a proximal amputation. Patient understands the risks and benefits and has decided to proceed with a bilateral hallux toenail matrixectomy, but she wants to do this in mid June when she has some time off of work.    Onychodystrophy  -Discussed etiologies and treatment options for onychodystrophy. There may be fungi in the toenail, but it is not known until a nail culture is performed. Treatment options consist of leaving the toenail as is, treating the nail for fungi (culture would be taken today to confirm nail fungi), a nail avulsion and treating the fungi as the nail grows back in, or removing the toenail permanently. The oral medication can sometimes fluctuate liver values so the patient would need to get a liver blood draw before, during and after the 90 day treatment. Topical anti-fungals can be used, but they do not always full cure the toenail fungi and they must be used daily and sometimes for 6-12 months before noticing a difference in the toenail. Recurrence rate of toenail fungi is high. After reviewing risks and benefits, patient has decided to proceed with possible  treatment if the results are positive for fungi.  ---Toenail fungal culture obtained on 05/20/2022. It can take between 24 hours and 30 days for the final results. Patient understands this time frame. The patient will be called with the results once they are positive or at the end of the 30 day period once the final results comes back negative.    -Patient in agreement with the above treatment plan and all of patient's questions were answered.      Return to clinic one month for bilateral hallux toenail matrixectomy        Mindi Lemus, QUIN, QUIN

## 2022-05-23 RX ORDER — SUMATRIPTAN 100 MG/1
TABLET, FILM COATED ORAL
Qty: 9 TABLET | Refills: 0 | Status: SHIPPED | OUTPATIENT
Start: 2022-05-23 | End: 2022-06-07

## 2022-05-23 NOTE — TELEPHONE ENCOUNTER
Imitrex      Last Written Prescription Date:  5.9.22  Last Fill Quantity: #9,   # refills: 0  Last Office Visit: 4.22.22  Future Office visit:    Next 5 appointments (look out 90 days)    Jun 17, 2022  9:00 AM  (Arrive by 8:45 AM)  Return Visit with Mindi Lemus DPM  [facility] ([facility] ) [address] [place] [id number] [phone]           Routing refill request to provider for review/approval because:  Drug not on the [facility], [facility] or [facility] refill protocol or controlled substance

## 2022-05-26 DIAGNOSIS — B35.1 ONYCHOMYCOSIS: Primary | ICD-10-CM

## 2022-05-26 RX ORDER — CLOTRIMAZOLE 1 G/ML
SOLUTION TOPICAL 2 TIMES DAILY
Qty: 60 ML | Refills: 3 | Status: SHIPPED | OUTPATIENT
Start: 2022-05-26 | End: 2023-01-23

## 2022-05-26 NOTE — PROGRESS NOTES
Patient's toenail culture was positive for fungi (culture from 05/20/2022). Patient requested a topical anti-fungal ointment and Clotrimazole ointment was ordered on 05/26/2022.

## 2022-06-06 DIAGNOSIS — G43.009 MIGRAINE WITHOUT AURA AND WITHOUT STATUS MIGRAINOSUS, NOT INTRACTABLE: ICD-10-CM

## 2022-06-07 RX ORDER — SUMATRIPTAN 100 MG/1
TABLET, FILM COATED ORAL
Qty: 9 TABLET | Refills: 3 | Status: SHIPPED | OUTPATIENT
Start: 2022-06-07 | End: 2022-07-27

## 2022-06-07 NOTE — TELEPHONE ENCOUNTER
imitrex       Last Written Prescription Date:  5-23-22  Last Fill Quantity: 9,   # refills: 0  Last Office Visit: 4-22-22  Future Office visit:    Next 5 appointments (look out 90 days)    Jun 17, 2022  9:00 AM  (Arrive by 8:45 AM)  Return Visit with Mindi Lemus DPM  [facility] ([facility] ) [address] [place] [id number] [phone]

## 2022-06-17 ENCOUNTER — OFFICE VISIT (OUTPATIENT)
Dept: PODIATRY | Facility: OTHER | Age: 37
End: 2022-06-17
Attending: PODIATRIST
Payer: COMMERCIAL

## 2022-06-17 VITALS
DIASTOLIC BLOOD PRESSURE: 85 MMHG | SYSTOLIC BLOOD PRESSURE: 130 MMHG | TEMPERATURE: 97.8 F | HEART RATE: 94 BPM | OXYGEN SATURATION: 98 %

## 2022-06-17 DIAGNOSIS — B35.1 ONYCHOMYCOSIS: ICD-10-CM

## 2022-06-17 DIAGNOSIS — L60.0 INGROWN TOENAIL OF BOTH FEET: Primary | ICD-10-CM

## 2022-06-17 PROCEDURE — 11750 EXCISION NAIL&NAIL MATRIX: CPT | Performed by: PODIATRIST

## 2022-06-17 PROCEDURE — 11750 EXCISION NAIL&NAIL MATRIX: CPT | Mod: T5 | Performed by: PODIATRIST

## 2022-06-17 PROCEDURE — 99213 OFFICE O/P EST LOW 20 MIN: CPT | Mod: 25 | Performed by: PODIATRIST

## 2022-06-17 PROCEDURE — G0463 HOSPITAL OUTPT CLINIC VISIT: HCPCS | Mod: 25

## 2022-06-17 PROCEDURE — 11750 EXCISION NAIL&NAIL MATRIX: CPT | Mod: T5

## 2022-06-17 ASSESSMENT — PAIN SCALES - GENERAL: PAINLEVEL: NO PAIN (0)

## 2022-06-17 NOTE — PATIENT INSTRUCTIONS
Nail procedure care:  -Start epsom salt soaks tomorrow. Soak the foot 1-2 times a day for 20 minutes.  -Apply an antibiotic cream, gauze and a bandaid over the toe for the first week after the procedure.  -After one week, switch to a betadine dressing. Apply a small amount of betadine on gauze or dab the betadine over the toe with gauze and apply another dry gauze over the toe followed by a band aid or tape.  -Do not apply a band aid directly over the nail procedure site without gauze.     Keep the toe covered at all times until it is completely healed.  -You may develop a black scab over the nail bed--let this fall off on its own and don't pick at it.  -The toe may drain for 2-3 weeks. It is normal for it to have a clear drainage.    Watching for signs of infection:  If the toe has a thick, white pus coming from the procedure site or if the the toe becomes red, swollen, painful, or you begin to feel sick (fever/chills/nausea/vomitting), return to the podiatry clinic immediately or to the emergency room if after hours.      Thank you for allowing  and our Podiatry team to participate in your care. Please call our office at [phone] with scheduling questions or with any other questions or concerns.

## 2022-06-17 NOTE — NURSING NOTE
"Chief Complaint   Patient presents with     Ingrown Toenail       Initial /85 (BP Location: Left arm, Patient Position: Sitting, Cuff Size: Adult Regular)   Pulse 94   Temp 97.8  F (36.6  C) (Tympanic)   SpO2 98%  Estimated body mass index is 33.13 kg/m  as calculated from the following:    Height as of 3/5/21: 1.626 m (5' 4\").    Weight as of 4/22/22: 87.5 kg (193 lb).  Medication Reconciliation: tee Mckeon  "

## 2022-06-17 NOTE — PROGRESS NOTES
Chief complaint: Patient presents with:  Ingrown Toenail      History of Present Illness: This 36 year old female is seen for follow-up management of ingrown toenail on the bilateral hallux toenail.    She had both toenails removed in 2016 and both borders grew back. She says they do not cause her consistent pain, but they catch on her socks so she has to keep them trimmed. She would like to discuss treatment options for the regrowth because they do sometimes cause her pain.    No further pedal complaints today.       /85 (BP Location: Left arm, Patient Position: Sitting, Cuff Size: Adult Regular)   Pulse 94   Temp 97.8  F (36.6  C) (Tympanic)   SpO2 98%     Patient Active Problem List   Diagnosis     ACP (advance care planning)     Tobacco use disorder     Migraine without aura and without status migrainosus, not intractable     Gastroesophageal reflux disease, esophagitis presence not specified     Irritable bowel syndrome with both constipation and diarrhea     Family history of heart disease in male family member before age 55     Single current episode of major depressive disorder, unspecified depression episode severity     Anxiety     Insomnia, unspecified type     Acute cholecystitis     Headache disorder     Meningitis, unspecified(322.9)     Migraine headache     Family planning     Family history of cardiac disorder     Irritable bowel syndrome     Tobacco user       Past Surgical History:   Procedure Laterality Date     LAPAROSCOPIC CHOLECYSTECTOMY N/A 2/27/2021    Procedure: LAPAROSCOPIC CHOLECYSTECTOMY,;  Surgeon: Delmar French DO;  Location: [facility]     ORTHOPEDIC SURGERY  2002    ankle surgery- bone spur removal       Current Outpatient Medications   Medication     clotrimazole (LOTRIMIN) 1 % external solution     hydrOXYzine (VISTARIL) 25 MG capsule     pantoprazole (PROTONIX) 40 MG EC tablet     prochlorperazine (COMPAZINE) 5 MG tablet     SM ALLERGY RELIEF D  MG 24 hr tablet      SUMAtriptan (IMITREX) 100 MG tablet     venlafaxine (EFFEXOR-XR) 75 MG 24 hr capsule     Current Facility-Administered Medications   Medication     medroxyPROGESTERone (DEPO-PROVERA) injection 150 mg          Allergies   Allergen Reactions     Linaclotide      diarrhea       Family History   Problem Relation Age of Onset     Heart Disease Mother         8 stents; onset 40s?     Chronic Obstructive Pulmonary Disease Mother      Cancer Father         mouth and throat cancer     Heart Disease Brother         stents; age 37; occlusive disease     Bladder Cancer Brother        Social History     Socioeconomic History     Marital status: Single   Tobacco Use     Smoking status: Current Every Day Smoker     Packs/day: 0.50     Years: 15.00     Pack years: 7.50     Types: Cigarettes     Smokeless tobacco: Never Used     Tobacco comment: declines quitplan referral 3/19/21   Vaping Use     Vaping Use: Never used   Substance and Sexual Activity     Alcohol use: No     Drug use: No     Sexual activity: Never     Birth control/protection: Injection   Other Topics Concern     Parent/sibling w/ CABG, MI or angioplasty before 65F 55M? Yes       ROS: 10 point ROS neg other than the symptoms noted above in the HPI.  EXAM  Constitutional: healthy, alert and no distress    Psychiatric: mentation appears normal and affect normal/bright    VASCULAR:  -Dorsalis pedis pulse +2/4 b/l  -Posterior tibial pulse +1/4 b/l  -Capillary refill time < 3 seconds to b/l hallux  NEURO:  -Light touch sensation intact to b/l plantar forefoot  DERM:  -Skin temperature, texture and turgor WNL b/l  -Toenails elongated, thickened, dystrophic and discolored x 10  -Mild incurvation to the bilateral border of the bilateral hallux  ---No erythema and no edema to the nail border  ---No open wounds and no drainage  ---LEFT hallux has bilateral nail spicule regrowths and RIGHT hallux toenail is mildly thickened and dystrophic  ---No severe erythema, no  ascending erythema, no calor, no purulence, no malodor, no other SOI.  MSK:  -Muscle strength of ankles +5/5 for dorsiflexion, plantarflexion, ABDUction and ADDuction b/l    FUNGAL CULTURE RESULTS 05/20/2022     Aureobasidium pullulans Abnormal          ============================================================    ASSESSMENT:  (L60.0) Ingrown toenail of both feet    (B35.1) Onychomycosis      PLAN:  -Patient evaluated and examined. Treatment options discussed with no educational barriers noted.    Onychodystrophy  -Patient's culture results were positive. She tried a topical medication but it irritated her toenails. Discussed the oral medication in detail. The oral medication can sometimes fluctuate liver values so the patient would need to get a liver blood draw before, during and after the 90 day treatment. Topical anti-fungals can be used, but they do not always full cure the toenail fungi and they must be used daily and sometimes for 6-12 months before noticing a difference in the toenail. Recurrence rate of toenail fungi is high. After reviewing risks and benefits, patient has decided to not treat her nail fungus at this time.    -Discussed nail procedure options and etiologies and treatments for ingrown toenails. Conservatively, patient could opt for a slant back today and keep monitoring the toe since there are no SOI. Discussed risks and benefits and healing course of a nail border avulsion vs. Matrixectomy including post procedure infection or a non-healing wound, both of which could lead to a life threatening infection or amputation of the foot or leg or a proximal amputation. Patient understands the risks and benefits and has decided to proceed with the following:    -Matrixectomy of bilateral hallux toenails: Written and verbal consent obtained after reviewing risks and benefits of the procedure. Patient understands that although phenol is used in attempt to prevent regrowth of the ingrown toenail, the  "nail can still grow back. There is also a risk of post procedure infection. A severe foot infection could lead to a proximal foot or leg amputation or loss of life, so the patient is advised to return to podiatry or the ED immediately if the patient notices any SOI. The patient is in agreement with this plan and wishes to proceed with the procedure. A time-out was performed to identify the correct patient, limb, digit and procedure.    An alcohol prep pad was applied to  to the base of the bilateral hallux. The digit was injected with 7 mL of 1:1 of 2% Lidocaine plain and 0.5% marcaine plain. Adequate local anesthesia was obtained. A ring tournicot was applied to the digit and a chloroprep was applied to the hallux. A freer was used to loosen the nail from the underlying nail bed. An English Anvil and a hemostat were then used to remove the nail in total. A total of three applications of phenol were applied for 30 seconds per application. The digit was rinsed thoroughly with alcohol. The tournicot was removed from the the bilateral hallux and there was a prompt hyperemic response to the bilateral hallux. The wound was then dressed with an Silvadene, gauze and 1\" coban. The patient was educated on after procedure care including daily epsom salt soaks starting tomorrow followed by dressing of the toe with an antibiotic cream and a bandaid until the wound site on the toe stops draining (2-3 weeks). Provided education on how to look for signs of infection (redness, swelling, pain, purulence, fever, chills, nausea, vomiting) and the patient was instructed to return to the clinic or Emergency Department immediately if there are any signs of infection.    -Patient in agreement with the above treatment plan and all of patient's questions were answered.      Return as needed        Mindi Lemus DPM, QUIN  "

## 2022-06-20 LAB — BACTERIA SPEC CULT: ABNORMAL

## 2022-07-09 ENCOUNTER — HEALTH MAINTENANCE LETTER (OUTPATIENT)
Age: 37
End: 2022-07-09

## 2022-07-26 DIAGNOSIS — G43.009 MIGRAINE WITHOUT AURA AND WITHOUT STATUS MIGRAINOSUS, NOT INTRACTABLE: ICD-10-CM

## 2022-07-27 RX ORDER — SUMATRIPTAN 100 MG/1
TABLET, FILM COATED ORAL
Qty: 9 TABLET | Refills: 0 | Status: SHIPPED | OUTPATIENT
Start: 2022-07-27 | End: 2022-08-10

## 2022-07-27 NOTE — TELEPHONE ENCOUNTER
Sumatriptan 100mg      Last Written Prescription Date:  6/7/22  Last Fill Quantity: 9,   # refills: 3  Last Office Visit: 4/22/22  Future Office visit:       Routing refill request to provider for review/approval because:    Serotonin Agonists Failed 07/26/2022 10:53 AM   Protocol Details  Serotonin Agonist request needs review.

## 2022-08-08 ENCOUNTER — ALLIED HEALTH/NURSE VISIT (OUTPATIENT)
Dept: ALLERGY | Facility: OTHER | Age: 37
End: 2022-08-08
Attending: FAMILY MEDICINE
Payer: COMMERCIAL

## 2022-08-08 DIAGNOSIS — Z30.42 ENCOUNTER FOR SURVEILLANCE OF INJECTABLE CONTRACEPTIVE: Primary | ICD-10-CM

## 2022-08-08 DIAGNOSIS — G43.009 MIGRAINE WITHOUT AURA AND WITHOUT STATUS MIGRAINOSUS, NOT INTRACTABLE: ICD-10-CM

## 2022-08-08 DIAGNOSIS — K21.9 GASTROESOPHAGEAL REFLUX DISEASE, UNSPECIFIED WHETHER ESOPHAGITIS PRESENT: ICD-10-CM

## 2022-08-08 PROCEDURE — 96372 THER/PROPH/DIAG INJ SC/IM: CPT

## 2022-08-08 RX ADMIN — MEDROXYPROGESTERONE ACETATE 150 MG: 150 INJECTION, SUSPENSION INTRAMUSCULAR at 08:04

## 2022-08-08 NOTE — PROGRESS NOTES
Clinic Administered Medication Documentation    Administrations This Visit     medroxyPROGESTERone (DEPO-PROVERA) injection 150 mg     Admin Date  08/08/2022 Action  Given Dose  150 mg Route  Intramuscular Site  Left Upper Outer Quadrant Administered By  Dillan Bower RN    Ordering Provider: Kendra Yanez MD    Patient Supplied?: No                  Depo Provera Documentation    URINE HCG: not indicated    Depo-Provera Standing Order inclusion/exclusion criteria reviewed.   Patient meets: inclusion criteria     BP: Data Unavailable  LAST PAP/EXAM:   Lab Results   Component Value Date    PAP NIL 09/04/2019       Prior to injection, verified patient identity using patient's name and date of birth. Medication was administered. Please see MAR and medication order for additional information.     Was entire vial of medication used? Yes  Vial/Syringe: Single dose vial  Expiration Date:  01/31/2024    Patient instructed to report any adverse reaction to staff immediately .  NEXT INJECTION DUE: 10/24/22 - 11/7/22    Dillan Bower RN on 8/8/2022 at 8:05 AM

## 2022-08-10 RX ORDER — PANTOPRAZOLE SODIUM 40 MG/1
TABLET, DELAYED RELEASE ORAL
Qty: 90 TABLET | Refills: 0 | Status: SHIPPED | OUTPATIENT
Start: 2022-08-10 | End: 2022-11-15

## 2022-08-10 RX ORDER — SUMATRIPTAN 100 MG/1
TABLET, FILM COATED ORAL
Qty: 9 TABLET | Refills: 0 | Status: SHIPPED | OUTPATIENT
Start: 2022-08-10 | End: 2022-08-24

## 2022-08-10 NOTE — TELEPHONE ENCOUNTER
Imitrex       Last Written Prescription Date:  7/27/22  Last Fill Quantity: 9,   # refills: 0    Protonix       Last Written Prescription Date:  1/26/22  Last Fill Quantity: 90,   # refills: 1  Last Office Visit: 4/22/22  Future Office visit:

## 2022-08-30 ENCOUNTER — OFFICE VISIT (OUTPATIENT)
Dept: FAMILY MEDICINE | Facility: OTHER | Age: 37
End: 2022-08-30
Attending: NURSE PRACTITIONER
Payer: COMMERCIAL

## 2022-08-30 ENCOUNTER — NURSE TRIAGE (OUTPATIENT)
Dept: FAMILY MEDICINE | Facility: OTHER | Age: 37
End: 2022-08-30

## 2022-08-30 ENCOUNTER — ANCILLARY PROCEDURE (OUTPATIENT)
Dept: GENERAL RADIOLOGY | Facility: OTHER | Age: 37
End: 2022-08-30
Attending: NURSE PRACTITIONER
Payer: COMMERCIAL

## 2022-08-30 VITALS
HEIGHT: 64 IN | DIASTOLIC BLOOD PRESSURE: 80 MMHG | TEMPERATURE: 99 F | HEART RATE: 106 BPM | BODY MASS INDEX: 30.73 KG/M2 | RESPIRATION RATE: 16 BRPM | OXYGEN SATURATION: 93 % | WEIGHT: 180 LBS | SYSTOLIC BLOOD PRESSURE: 130 MMHG

## 2022-08-30 DIAGNOSIS — J06.9 VIRAL URI WITH COUGH: Primary | ICD-10-CM

## 2022-08-30 DIAGNOSIS — R05.9 COUGH: ICD-10-CM

## 2022-08-30 LAB
BASOPHILS # BLD AUTO: 0.1 10E3/UL (ref 0–0.2)
BASOPHILS NFR BLD AUTO: 1 %
EOSINOPHIL # BLD AUTO: 0.2 10E3/UL (ref 0–0.7)
EOSINOPHIL NFR BLD AUTO: 2 %
ERYTHROCYTE [DISTWIDTH] IN BLOOD BY AUTOMATED COUNT: 14.1 % (ref 10–15)
HCT VFR BLD AUTO: 43.6 % (ref 35–47)
HGB BLD-MCNC: 15 G/DL (ref 11.7–15.7)
IMM GRANULOCYTES # BLD: 0 10E3/UL
IMM GRANULOCYTES NFR BLD: 0 %
LYMPHOCYTES # BLD AUTO: 1.5 10E3/UL (ref 0.8–5.3)
LYMPHOCYTES NFR BLD AUTO: 15 %
MCH RBC QN AUTO: 31.3 PG (ref 26.5–33)
MCHC RBC AUTO-ENTMCNC: 34.4 G/DL (ref 31.5–36.5)
MCV RBC AUTO: 91 FL (ref 78–100)
MONOCYTES # BLD AUTO: 0.7 10E3/UL (ref 0–1.3)
MONOCYTES NFR BLD AUTO: 8 %
NEUTROPHILS # BLD AUTO: 7.2 10E3/UL (ref 1.6–8.3)
NEUTROPHILS NFR BLD AUTO: 74 %
NRBC # BLD AUTO: 0 10E3/UL
NRBC BLD AUTO-RTO: 0 /100
PLATELET # BLD AUTO: 286 10E3/UL (ref 150–450)
RBC # BLD AUTO: 4.8 10E6/UL (ref 3.8–5.2)
WBC # BLD AUTO: 9.6 10E3/UL (ref 4–11)

## 2022-08-30 PROCEDURE — G0463 HOSPITAL OUTPT CLINIC VISIT: HCPCS

## 2022-08-30 PROCEDURE — 85004 AUTOMATED DIFF WBC COUNT: CPT | Mod: ZL | Performed by: NURSE PRACTITIONER

## 2022-08-30 PROCEDURE — 71046 X-RAY EXAM CHEST 2 VIEWS: CPT | Mod: TC

## 2022-08-30 PROCEDURE — 99213 OFFICE O/P EST LOW 20 MIN: CPT | Performed by: NURSE PRACTITIONER

## 2022-08-30 PROCEDURE — 36415 COLL VENOUS BLD VENIPUNCTURE: CPT | Mod: ZL | Performed by: NURSE PRACTITIONER

## 2022-08-30 RX ORDER — ALBUTEROL SULFATE 90 UG/1
2 AEROSOL, METERED RESPIRATORY (INHALATION) EVERY 6 HOURS
Qty: 18 G | Refills: 0 | Status: SHIPPED | OUTPATIENT
Start: 2022-08-30 | End: 2023-01-23

## 2022-08-30 ASSESSMENT — PAIN SCALES - GENERAL: PAINLEVEL: NO PAIN (0)

## 2022-08-30 NOTE — NURSING NOTE
"Chief Complaint   Patient presents with     Cough       Initial /80 (BP Location: Right arm, Patient Position: Chair)   Pulse 106   Temp 99  F (37.2  C)   Resp 16   Ht 1.626 m (5' 4\")   Wt 81.6 kg (180 lb)   SpO2 93%   BMI 30.90 kg/m   Estimated body mass index is 30.9 kg/m  as calculated from the following:    Height as of this encounter: 1.626 m (5' 4\").    Weight as of this encounter: 81.6 kg (180 lb).  Medication Reconciliation: complete  Lily Huang LPN    "

## 2022-08-30 NOTE — LETTER
August 30, 2022      Aubree Corcoran  [address]  [place] [address]        To Whom It May Concern:    Aubree Corcoran  was seen on 8/30/2022.  Please excuse her for then next 1-2 days due to illness.        Sincerely,        Vivien Noel, CYRIL CNP

## 2022-08-30 NOTE — PATIENT INSTRUCTIONS
Can try cough and cold medicine   Rest  Stay hydrated   Follow up if no improvement or worsening symptoms

## 2022-08-30 NOTE — TELEPHONE ENCOUNTER
Protocol:  See PCP within 24 hours  PCP is out   [facility] per protocol        Reason for Disposition    [1] Continuous (nonstop) coughing interferes with work or school AND [2] no improvement using cough treatment per Care Advice    Additional Information    Negative: SEVERE difficulty breathing (e.g., struggling for each breath, speaks in single words)    Negative: Bluish (or gray) lips or face now    Negative: [1] Rapid onset of cough AND [2] has hives    Negative: Coughing started suddenly after medicine, an allergic food or bee sting    Negative: [1] Difficulty breathing AND [2] exposure to flames, smoke, or fumes    Negative: [1] Stridor AND [2] difficulty breathing    Negative: Sounds like a life-threatening emergency to the triager    Negative: Choked on object of food that could be caught in the throat    Negative: Chest pain is main symptom    Negative: [1] Previous asthma attacks AND [2] this feels like asthma attack    Negative: Cough lasts > 3 weeks    Negative: Wet cough (productive; white-yellow, yellow, green, or allyn colored sputum)    Negative: [1] Dry cough (non-productive;  no sputum or minimal clear sputum) AND [2] within 14 days of COVID-19 Exposure    Negative: [1] MODERATE difficulty breathing (e.g., speaks in phrases, SOB even at rest, pulse 100-120) AND [2] still present when not coughing    Negative: Chest pain  (Exception: MILD central chest pain, present only when coughing)    Negative: Patient sounds very sick or weak to the triager    Negative: [1] MILD difficulty breathing (e.g., minimal/no SOB at rest, SOB with walking, pulse <100) AND [2] still present when not coughing    Negative: [1] Coughed up blood AND [2] > 1 tablespoon (15 ml)  (Exception: Blood-tinged sputum.)    Negative: Fever > 103 F (39.4 C)    Negative: [1] Fever > 101 F (38.3 C) AND [2] age > 60 years    Negative: [1] Fever > 100.0 F (37.8 C) AND [2] bedridden (e.g.,  "nursing home patient, CVA, chronic illness, recovering from surgery)    Negative: [1] Fever > 100.0 F (37.8 C) AND [2] diabetes mellitus or weak immune system (e.g., HIV positive, cancer chemo, splenectomy, organ transplant, chronic steroids)    Negative: Wheezing is present    Negative: [1] Ankle swelling AND [2] swelling is increasing    Negative: SEVERE coughing spells (e.g., whooping sound after coughing, vomiting after coughing)    Negative: Fever present > 3 days (72 hours)    Negative: [1] Fever returns after gone for over 24 hours AND [2] symptoms worse or not improved    Negative: [1] Using nasal washes and pain medicine > 24 hours AND [2] sinus pain (around cheekbone or eye) persists    Negative: Earache is present    Answer Assessment - Initial Assessment Questions  1. ONSET: \"When did the cough begin?\"       Middle of the night 8/28/.  Pt works midnight shift  2. SEVERITY: \"How bad is the cough today?\"       severe  3. SPUTUM: \"Describe the color of your sputum\" (none, dry cough; clear, white, yellow, green)      Feels sputum but unable to cough up & out  4. HEMOPTYSIS: \"Are you coughing up any blood?\" If so ask: \"How much?\" (flecks, streaks, tablespoons, etc.)      denies  5. DIFFICULTY BREATHING: \"Are you having difficulty breathing?\" If Yes, ask: \"How bad is it?\" (e.g., mild, moderate, severe)     - MILD: No SOB at rest, mild SOB with walking, speaks normally in sentences, can lie down, no retractions, pulse < 100.     - MODERATE: SOB at rest, SOB with minimal exertion and prefers to sit, cannot lie down flat, speaks in phrases, mild retractions, audible wheezing, pulse 100-120.     - SEVERE: Very SOB at rest, speaks in single words, struggling to breathe, sitting hunched forward, retractions, pulse > 120       No  6. FEVER: \"Do you have a fever?\" If Yes, ask: \"What is your temperature, how was it measured, and when did it start?\"      Low grade fever this a.m.  99.0 F, oral with medications  7. " "CARDIAC HISTORY: \"Do you have any history of heart disease?\" (e.g., heart attack, congestive heart failure)       No  8. LUNG HISTORY: \"Do you have any history of lung disease?\"  (e.g., pulmonary embolus, asthma, emphysema)      No  9. PE RISK FACTORS: \"Do you have a history of blood clots?\" (or: recent major surgery, recent prolonged travel, bedridden)      No  10. OTHER SYMPTOMS: \"Do you have any other symptoms?\" (e.g., runny nose, wheezing, chest pain)        Throat hurts, feels like result of cough.  Generalized weakness  11. PREGNANCY: \"Is there any chance you are pregnant?\" \"When was your last menstrual period?\"        No  12. TRAVEL: \"Have you traveled out of the country in the last month?\" (e.g., travel history, exposures)        No    Protocols used: COUGH - ACUTE NON-PRODUCTIVE-A-AH      "

## 2022-08-30 NOTE — PROGRESS NOTES
"  Assessment & Plan     Viral URI with cough  Reviewed lab and XR - no signs of pneumonia at this time.  Discussed symptomatic treatment for virus with cough.  Encourage her to not suppress her cough during the day, due to risk of pneumonia.  Use albuterol inhaler as needed return here if no improvement or to the ER if increased SOB, difficulty breathing, unable to stay hydrated or any concerns   - CBC with platelets and differential; Future  - XR CHEST 2 VW (Clinic Performed); Future  - CBC with platelets and differential  - albuterol (PROAIR HFA/PROVENTIL HFA/VENTOLIN HFA) 108 (90 Base) MCG/ACT inhaler; Inhale 2 puffs into the lungs every 6 hours       BMI:   Estimated body mass index is 30.9 kg/m  as calculated from the following:    Height as of this encounter: 1.626 m (5' 4\").    Weight as of this encounter: 81.6 kg (180 lb).       See Patient Instructions    No follow-ups on file.    CYRIL Ibrahim [facility] - MONIQUE Mak is a 37 year old, presenting for the following health issues:  Cough      HPI     Acute Illness  Acute illness concerns: Cough  Onset/Duration: 2 days   Symptoms:  Fever: YES low grade    Chills/Sweats: No  Headache (location?): YES  Sinus Pressure: No  Conjunctivitis:  No  Ear Pain: no  Rhinorrhea: No  Congestion: No  Sore Throat: No  Cough: YES-productive sputum- haven't seen color   Wheeze: No  Decreased Appetite: YES  Nausea: YES  Vomiting: No  Diarrhea: No  Dysuria/Freq.: No  Dysuria or Hematuria: No  Fatigue/Achiness: YES  Sick/Strep Exposure: No  Therapies tried and outcome: None  Denies sick contacts   Negative home covid test today       Review of Systems   CONSTITUTIONAL:fatigue and fever low grade  INTEGUMENTARY/SKIN: NEGATIVE for worrisome rashes, moles or lesions  EYES: NEGATIVE for vision changes or irritation  ENT/MOUTH: NEGATIVE for ear, mouth and throat problems  RESP:congested cough and chest heaviness   CV: NEGATIVE " "for chest pain, palpitations or peripheral edema  GI: nausea and poor appetite  : denies dysuria   MUSCULOSKELETAL: generalized body aches   NEURO: fatigue and headaches       Objective    /80 (BP Location: Right arm, Patient Position: Chair)   Pulse 106   Temp 99  F (37.2  C)   Resp 16   Ht 1.626 m (5' 4\")   Wt 81.6 kg (180 lb)   SpO2 93%   BMI 30.90 kg/m    Body mass index is 30.9 kg/m .  Physical Exam   GENERAL: alert   HENT: ear canals and TM's normal, nose and mouth without ulcers or lesions  NECK: no adenopathy, no asymmetry, masses, or scars and thyroid normal to palpation  RESP: congested throughout with harsh cough   CV: regular rate and rhythm, normal S1 S2, no S3 or S4, no murmur, click or rub, no peripheral edema and peripheral pulses strong  ABDOMEN: soft, nontender, no hepatosplenomegaly, no masses and bowel sounds normal  SKIN: no suspicious lesions or rashes  NEURO: Normal strength and tone, mentation intact and speech normal  PSYCH: mentation appears normal and fatigued    Results for orders placed or performed in visit on 08/30/22   XR CHEST 2 VW (Clinic Performed)     Status: None    Narrative    PROCEDURE:  XR CHEST 2 VIEWS    HISTORY:  Cough.     COMPARISON:  None.    FINDINGS:   The cardiac silhouette is normal in size. The pulmonary vasculature is  normal.  The lungs are clear. No pleural effusion or pneumothorax.      Impression    IMPRESSION:  No acute cardiopulmonary disease.      WALT CHRISTIAN MD         SYSTEM ID:  [id number]   Results for orders placed or performed in visit on 08/30/22   CBC with platelets and differential     Status: None   Result Value Ref Range    WBC Count 9.6 4.0 - 11.0 10e3/uL    RBC Count 4.80 3.80 - 5.20 10e6/uL    Hemoglobin 15.0 11.7 - 15.7 g/dL    Hematocrit 43.6 35.0 - 47.0 %    MCV 91 78 - 100 fL    MCH 31.3 26.5 - 33.0 pg    MCHC 34.4 31.5 - 36.5 g/dL    RDW 14.1 10.0 - 15.0 %    Platelet Count 286 150 - 450 10e3/uL    % Neutrophils 74 %    " % Lymphocytes 15 %    % Monocytes 8 %    % Eosinophils 2 %    % Basophils 1 %    % Immature Granulocytes 0 %    NRBCs per 100 WBC 0 <1 /100    Absolute Neutrophils 7.2 1.6 - 8.3 10e3/uL    Absolute Lymphocytes 1.5 0.8 - 5.3 10e3/uL    Absolute Monocytes 0.7 0.0 - 1.3 10e3/uL    Absolute Eosinophils 0.2 0.0 - 0.7 10e3/uL    Absolute Basophils 0.1 0.0 - 0.2 10e3/uL    Absolute Immature Granulocytes 0.0 <=0.4 10e3/uL    Absolute NRBCs 0.0 10e3/uL   CBC with platelets and differential     Status: None    Narrative    The following orders were created for panel order CBC with platelets and differential.  Procedure                               Abnormality         Status                     ---------                               -----------         ------                     CBC with platelets and d...[[id number]]                      Final result                 Please view results for these tests on the individual orders.                   .  ..

## 2022-09-04 ENCOUNTER — HEALTH MAINTENANCE LETTER (OUTPATIENT)
Age: 37
End: 2022-09-04

## 2022-09-06 DIAGNOSIS — G43.009 MIGRAINE WITHOUT AURA AND WITHOUT STATUS MIGRAINOSUS, NOT INTRACTABLE: ICD-10-CM

## 2022-09-08 RX ORDER — SUMATRIPTAN 100 MG/1
TABLET, FILM COATED ORAL
Qty: 9 TABLET | Refills: 0 | Status: SHIPPED | OUTPATIENT
Start: 2022-09-08 | End: 2022-09-29

## 2022-09-08 NOTE — TELEPHONE ENCOUNTER
Imitrex       Last Written Prescription Date:  8/24/22  Last Fill Quantity: 9,   # refills: 0  Last Office Visit: 8/30/22  Future Office visit:

## 2022-09-09 DIAGNOSIS — G43.009 MIGRAINE WITHOUT AURA AND WITHOUT STATUS MIGRAINOSUS, NOT INTRACTABLE: ICD-10-CM

## 2022-09-09 DIAGNOSIS — F32.A DEPRESSION, UNSPECIFIED DEPRESSION TYPE: ICD-10-CM

## 2022-09-09 DIAGNOSIS — F41.9 ANXIETY: ICD-10-CM

## 2022-09-09 NOTE — TELEPHONE ENCOUNTER
Next 5 appointments (look out 90 days)    Oct 14, 2022 10:45 AM  (Arrive by 10:30 AM)  SHORT with Kendra Yanez MD  [facility] [place] ([facility] [place] ) [address]  [place] [address]  [phone]

## 2022-09-14 RX ORDER — VENLAFAXINE HYDROCHLORIDE 75 MG/1
CAPSULE, EXTENDED RELEASE ORAL
Qty: 90 CAPSULE | Refills: 0 | Status: SHIPPED | OUTPATIENT
Start: 2022-09-14 | End: 2022-12-15

## 2022-09-14 NOTE — TELEPHONE ENCOUNTER
effexor      Last Written Prescription Date:  2/16/22  Last Fill Quantity: 90,   # refills: 1  Last Office Visit: 8/30/22  Future Office visit:    Next 5 appointments (look out 90 days)    Oct 14, 2022 10:45 AM  (Arrive by 10:30 AM)  SHORT with Kendra Yanez MD  [facility] [place] ([facility] [place] ) [address]  [place] [address]  [phone]

## 2022-09-29 DIAGNOSIS — G43.009 MIGRAINE WITHOUT AURA AND WITHOUT STATUS MIGRAINOSUS, NOT INTRACTABLE: ICD-10-CM

## 2022-09-29 RX ORDER — SUMATRIPTAN 100 MG/1
TABLET, FILM COATED ORAL
Qty: 9 TABLET | Refills: 0 | Status: SHIPPED | OUTPATIENT
Start: 2022-09-29 | End: 2022-10-17

## 2022-09-29 NOTE — TELEPHONE ENCOUNTER
SUMAtriptan      Last Written Prescription Date:  9/8/22  Last Fill Quantity: 9,   # refills: 0  Last Office Visit: 8/30/22  Future Office visit:    Next 5 appointments (look out 90 days)    Oct 14, 2022 10:45 AM  (Arrive by 10:30 AM)  SHORT with Kendra Yanez MD  [facility] [place] ([facility] [place] ) [address]  [place] [address]  [phone]           Routing refill request to provider for review/approval because:

## 2022-10-11 DIAGNOSIS — G43.009 MIGRAINE WITHOUT AURA AND WITHOUT STATUS MIGRAINOSUS, NOT INTRACTABLE: ICD-10-CM

## 2022-10-11 DIAGNOSIS — R09.81 CHRONIC NASAL CONGESTION: ICD-10-CM

## 2022-10-13 ENCOUNTER — MYC REFILL (OUTPATIENT)
Dept: FAMILY MEDICINE | Facility: OTHER | Age: 37
End: 2022-10-13

## 2022-10-13 DIAGNOSIS — G43.009 MIGRAINE WITHOUT AURA AND WITHOUT STATUS MIGRAINOSUS, NOT INTRACTABLE: ICD-10-CM

## 2022-10-17 RX ORDER — SUMATRIPTAN 100 MG/1
TABLET, FILM COATED ORAL
Qty: 9 TABLET | Refills: 0 | OUTPATIENT
Start: 2022-10-17

## 2022-10-17 RX ORDER — SUMATRIPTAN 100 MG/1
TABLET, FILM COATED ORAL
Qty: 9 TABLET | Refills: 0 | Status: SHIPPED | OUTPATIENT
Start: 2022-10-17 | End: 2022-11-15

## 2022-10-17 NOTE — TELEPHONE ENCOUNTER
Allergy/Decongestant      Last Written Prescription Date:  9.9.22  Last Fill Quantity: #30,   # refills: 0  Last Office Visit: 4.22.22  Future Office visit:    Next 5 appointments (look out 90 days)    Nov 11, 2022  9:15 AM  (Arrive by 8:00 AM)  SHORT with Kendra Yanez MD  [facility] ([facility] ) [address]  [place] [address]  [phone]           Routing refill request to provider for review/approval because:  Drug not on the [facility], [facility] or [facility] refill protocol or controlled substance

## 2022-10-17 NOTE — TELEPHONE ENCOUNTER
Imitrex      Last Written Prescription Date:  9.29.22  Last Fill Quantity: #9,   # refills: 0  Last Office Visit: 4.22.22.  Future Office visit:    Next 5 appointments (look out 90 days)    Nov 11, 2022  9:15 AM  (Arrive by 8:00 AM)  SHORT with Kendra Yanez MD  [facility] [place] ([facility] [place] ) [address]  [place] [address]  [phone]           Routing refill request to provider for review/approval because:

## 2022-10-18 RX ORDER — LORATADINE AND PSEUDOEPHEDRINE SULFATE 10; 240 MG/1; MG/1
TABLET, FILM COATED, EXTENDED RELEASE ORAL
Qty: 30 TABLET | Refills: 0 | Status: SHIPPED | OUTPATIENT
Start: 2022-10-18 | End: 2022-12-15

## 2022-11-01 ENCOUNTER — ALLIED HEALTH/NURSE VISIT (OUTPATIENT)
Dept: ALLERGY | Facility: OTHER | Age: 37
End: 2022-11-01
Attending: FAMILY MEDICINE
Payer: COMMERCIAL

## 2022-11-01 DIAGNOSIS — Z30.42 ENCOUNTER FOR SURVEILLANCE OF INJECTABLE CONTRACEPTIVE: Primary | ICD-10-CM

## 2022-11-01 PROCEDURE — 96372 THER/PROPH/DIAG INJ SC/IM: CPT

## 2022-11-01 RX ADMIN — MEDROXYPROGESTERONE ACETATE 150 MG: 150 INJECTION, SUSPENSION INTRAMUSCULAR at 10:44

## 2022-11-01 NOTE — PROGRESS NOTES
Clinic Administered Medication Documentation    Administrations This Visit     medroxyPROGESTERone (DEPO-PROVERA) injection 150 mg     Admin Date  11/01/2022 Action  Given Dose  150 mg Route  Intramuscular Site  Right Upper Outer Quadrant Administered By  Alma Hwang RN    Ordering Provider: Kendra Yanez MD    Patient Supplied?: No                  Depo Provera Documentation    URINE HCG: not indicated    Depo-Provera Standing Order inclusion/exclusion criteria reviewed.   Patient meets: inclusion criteria     BP: Data Unavailable  LAST PAP/EXAM:   Lab Results   Component Value Date    PAP NIL 09/04/2019       Prior to injection, verified patient identity using patient's name and date of birth. Medication was administered. Please see MAR and medication order for additional information.     Was entire vial of medication used? Yes  Vial/Syringe: Single dose vial  Expiration Date:  04/2024    Patient instructed to report any adverse reaction to staff immediately .  NEXT INJECTION DUE: 1/17/23 - 1/31/23    Alma Hwang RN on 11/1/2022 at 10:45 AM

## 2022-11-12 DIAGNOSIS — G43.009 MIGRAINE WITHOUT AURA AND WITHOUT STATUS MIGRAINOSUS, NOT INTRACTABLE: ICD-10-CM

## 2022-11-12 DIAGNOSIS — K21.9 GASTROESOPHAGEAL REFLUX DISEASE, UNSPECIFIED WHETHER ESOPHAGITIS PRESENT: ICD-10-CM

## 2022-11-15 RX ORDER — PANTOPRAZOLE SODIUM 40 MG/1
TABLET, DELAYED RELEASE ORAL
Qty: 90 TABLET | Refills: 1 | Status: SHIPPED | OUTPATIENT
Start: 2022-11-15 | End: 2023-05-08

## 2022-11-15 RX ORDER — SUMATRIPTAN 100 MG/1
TABLET, FILM COATED ORAL
Qty: 9 TABLET | Refills: 0 | Status: SHIPPED | OUTPATIENT
Start: 2022-11-15 | End: 2022-12-07

## 2022-11-15 NOTE — TELEPHONE ENCOUNTER
Imitrex      Last Written Prescription Date:  10.17.22  Last Fill Quantity: #9,   # refills: 0  Last Office Visit: 4.22.22  Future Office visit:       Routing refill request to provider for review/approval because:

## 2022-12-06 DIAGNOSIS — G43.009 MIGRAINE WITHOUT AURA AND WITHOUT STATUS MIGRAINOSUS, NOT INTRACTABLE: ICD-10-CM

## 2022-12-07 RX ORDER — SUMATRIPTAN 100 MG/1
TABLET, FILM COATED ORAL
Qty: 9 TABLET | Refills: 0 | Status: SHIPPED | OUTPATIENT
Start: 2022-12-07 | End: 2023-01-04

## 2022-12-07 NOTE — TELEPHONE ENCOUNTER
imitrex      Last Written Prescription Date:  11/15/22  Last Fill Quantity: 9,   # refills: 0  Last Office Visit: 8/30/22  Future Office visit:

## 2022-12-13 DIAGNOSIS — F32.A DEPRESSION, UNSPECIFIED DEPRESSION TYPE: ICD-10-CM

## 2022-12-13 DIAGNOSIS — F41.9 ANXIETY: ICD-10-CM

## 2022-12-13 DIAGNOSIS — G43.009 MIGRAINE WITHOUT AURA AND WITHOUT STATUS MIGRAINOSUS, NOT INTRACTABLE: ICD-10-CM

## 2022-12-13 DIAGNOSIS — R09.81 CHRONIC NASAL CONGESTION: ICD-10-CM

## 2022-12-14 NOTE — TELEPHONE ENCOUNTER
ALLERGY RELIEF/NASAL DECONGEST  MG 24 hr tablet 30 tablet 0 10/18/2022     venlafaxine (EFFEXOR XR) 75 MG 24 hr capsule 90 capsule 0 9/14/2022     Last Office Visit: 8/30/22  Future Office visit:

## 2022-12-15 RX ORDER — LORATADINE AND PSEUDOEPHEDRINE SULFATE 10; 240 MG/1; MG/1
TABLET, FILM COATED, EXTENDED RELEASE ORAL
Qty: 30 TABLET | Refills: 0 | Status: SHIPPED | OUTPATIENT
Start: 2022-12-15 | End: 2023-01-23

## 2022-12-15 RX ORDER — VENLAFAXINE HYDROCHLORIDE 75 MG/1
CAPSULE, EXTENDED RELEASE ORAL
Qty: 90 CAPSULE | Refills: 0 | Status: SHIPPED | OUTPATIENT
Start: 2022-12-15 | End: 2022-12-21

## 2022-12-19 NOTE — PROGRESS NOTES
"Aubree is a 37 year old who is being evaluated via a billable telephone visit.      What phone number would you like to be contacted at? [phone]  How would you like to obtain your AVS? MyChart  Distant Location (provider location):  On-site    Assessment & Plan     Anxiety  Overall improved.  Encouraged activity/exercise.  Increasing Effexor XR- mostly for migraine prophylaxis.  - venlafaxine (EFFEXOR XR) 150 MG 24 hr capsule; Take 1 capsule (150 mg) by mouth daily    Insomnia, unspecified type  Improved.  Continue Vistaril.    Migraine without aura and without status migrainosus, not intractable  Increase Effexor XR with goal of reducing frequency of migraines.  - venlafaxine (EFFEXOR XR) 150 MG 24 hr capsule; Take 1 capsule (150 mg) by mouth daily    Tobacco use disorder  Cessation advised    Encounter for tobacco use cessation counseling  Declines assistance.    Depression, unspecified depression type  As above.  - venlafaxine (EFFEXOR XR) 150 MG 24 hr capsule; Take 1 capsule (150 mg) by mouth daily     BMI:   Estimated body mass index is 30.9 kg/m  as calculated from the following:    Height as of 8/30/22: 1.626 m (5' 4\").    Weight as of 8/30/22: 81.6 kg (180 lb).       Patient Instructions   Increase Effexor XR to 150 mg - new script sent -monitor for improvement in both mood and migraine frequency.  Continue Imitrex as needed.  Smoking cessation advised.    Follow up for annual physical - come fasting - 3 months.      Due for vaccines - pnuemonnia - flu, hep b, covid. Schedule when ready.          Return in about 3 months (around 3/21/2023) for physical with fasting labs.    Kendra Bailey MD  [facility] - [place]    Subjective   Aubree is a 37 year old, presenting for the following health issues:  Depression and Anxiety      HPI     Due for vaccines - pnuemonnia - flu, hep b, covid.  Due for yearly physical.    Depression and Anxiety Follow-Up - Effexor XR 75 mg    How are you " doing with your depression since your last visit? Improved - settled in to house; almost a year since mom passed    How are you doing with your anxiety since your last visit?  No change    Are you having other symptoms that might be associated with depression or anxiety? No    Have you had a significant life event? No     Do you have any concerns with your use of alcohol or other drugs? No     Prn Vistaril    Insomnia - shiftwork - improved sleep    No new stressors    No exercise    No panic attacks    No substance use    Wants to continue at current dose    No side effects    Doesn't check BP - doesn't have home cuff    Social History     Tobacco Use     Smoking status: Every Day     Packs/day: 0.50     Years: 15.00     Pack years: 7.50     Types: Cigarettes     Smokeless tobacco: Never     Tobacco comments:     pt denies quit plan 6/17/2022   Vaping Use     Vaping Use: Never used   Substance Use Topics     Alcohol use: No     Drug use: No     PHQ 1/26/2022 4/22/2022 12/21/2022   PHQ-9 Total Score 18 8 0   Q9: Thoughts of better off dead/self-harm past 2 weeks Not at all Not at all Not at all     JESSICA-7 SCORE 1/26/2022 4/22/2022 12/21/2022   Total Score 19 4 0     Last PHQ-9 12/21/2022   1.  Little interest or pleasure in doing things 0   2.  Feeling down, depressed, or hopeless 0   3.  Trouble falling or staying asleep, or sleeping too much 0   4.  Feeling tired or having little energy 0   5.  Poor appetite or overeating 0   6.  Feeling bad about yourself 0   7.  Trouble concentrating 0   8.  Moving slowly or restless 0   Q9: Thoughts of better off dead/self-harm past 2 weeks 0   PHQ-9 Total Score 0   Difficulty at work, home, or with people -     JESSICA-7  12/21/2022   1. Feeling nervous, anxious, or on edge 0   2. Not being able to stop or control worrying 0   3. Worrying too much about different things 0   4. Trouble relaxing 0   5. Being so restless that it is hard to sit still 0   6. Becoming easily annoyed or  irritable 0   7. Feeling afraid, as if something awful might happen 0   JESSICA-7 Total Score 0   If you checked any problems, how difficult have they made it for you to do your work, take care of things at home, or get along with other people? Not difficult at all       Suicide Assessment Five-step Evaluation and Treatment (SAFE-T)      Migraine - Imitrex prn    Since your last clinic visit, how have your headaches changed?  Improved    How often are you getting headaches or migraines? A couple of times a week (1-2)    Are you able to do normal daily activities when you have a migraine? No    Are you taking rescue/relief medications? (Select all that apply) sumatriptan (Imitrex)    How helpful is your rescue/relief medication?  I get total relief    Are you taking any medications to prevent migraines? (Select all that apply)  No    In the past 4 weeks, how often have you gone to urgent care or the emergency room because of your headaches?  0     Imitrex - twice per week - for a while - less frequent now (had rough stretch late summer - allergy related)    On Effexor XR    Migraine - nausea    Rest helps    Doesn't miss work due to migraines        Review of Systems   Constitutional, HEENT, cardiovascular, pulmonary, gi and gu systems are negative, except as otherwise noted.      Objective           Vitals:  No vitals were obtained today due to virtual visit.    Physical Exam   healthy, alert and no distress  PSYCH: Alert and oriented times 3; coherent speech, normal   rate and volume, able to articulate logical thoughts, able   to abstract reason, no tangential thoughts, no hallucinations   or delusions  Her affect is normal  RESP: No cough, no audible wheezing, able to talk in full sentences  Remainder of exam unable to be completed due to telephone visits                Phone call duration: 7:53 minutes

## 2022-12-21 ENCOUNTER — VIRTUAL VISIT (OUTPATIENT)
Dept: FAMILY MEDICINE | Facility: OTHER | Age: 37
End: 2022-12-21
Attending: FAMILY MEDICINE
Payer: COMMERCIAL

## 2022-12-21 DIAGNOSIS — F32.A DEPRESSION, UNSPECIFIED DEPRESSION TYPE: ICD-10-CM

## 2022-12-21 DIAGNOSIS — G47.00 INSOMNIA, UNSPECIFIED TYPE: ICD-10-CM

## 2022-12-21 DIAGNOSIS — F41.9 ANXIETY: Primary | ICD-10-CM

## 2022-12-21 DIAGNOSIS — G43.009 MIGRAINE WITHOUT AURA AND WITHOUT STATUS MIGRAINOSUS, NOT INTRACTABLE: ICD-10-CM

## 2022-12-21 DIAGNOSIS — F17.200 TOBACCO USE DISORDER: ICD-10-CM

## 2022-12-21 DIAGNOSIS — Z71.6 ENCOUNTER FOR TOBACCO USE CESSATION COUNSELING: ICD-10-CM

## 2022-12-21 PROCEDURE — 99213 OFFICE O/P EST LOW 20 MIN: CPT | Mod: 93 | Performed by: FAMILY MEDICINE

## 2022-12-21 RX ORDER — VENLAFAXINE HYDROCHLORIDE 150 MG/1
150 CAPSULE, EXTENDED RELEASE ORAL DAILY
Qty: 90 CAPSULE | Refills: 1 | Status: SHIPPED | OUTPATIENT
Start: 2022-12-21 | End: 2023-06-28

## 2022-12-21 ASSESSMENT — PATIENT HEALTH QUESTIONNAIRE - PHQ9: SUM OF ALL RESPONSES TO PHQ QUESTIONS 1-9: 0

## 2022-12-21 ASSESSMENT — ANXIETY QUESTIONNAIRES
3. WORRYING TOO MUCH ABOUT DIFFERENT THINGS: NOT AT ALL
6. BECOMING EASILY ANNOYED OR IRRITABLE: NOT AT ALL
7. FEELING AFRAID AS IF SOMETHING AWFUL MIGHT HAPPEN: NOT AT ALL
2. NOT BEING ABLE TO STOP OR CONTROL WORRYING: NOT AT ALL
5. BEING SO RESTLESS THAT IT IS HARD TO SIT STILL: NOT AT ALL
IF YOU CHECKED OFF ANY PROBLEMS ON THIS QUESTIONNAIRE, HOW DIFFICULT HAVE THESE PROBLEMS MADE IT FOR YOU TO DO YOUR WORK, TAKE CARE OF THINGS AT HOME, OR GET ALONG WITH OTHER PEOPLE: NOT DIFFICULT AT ALL
4. TROUBLE RELAXING: NOT AT ALL
GAD7 TOTAL SCORE: 0
1. FEELING NERVOUS, ANXIOUS, OR ON EDGE: NOT AT ALL
GAD7 TOTAL SCORE: 0

## 2022-12-21 NOTE — PROGRESS NOTES
Called pt and advised due for physical and fasting labs in 3 months-pt states will call back at some point to schedule

## 2022-12-21 NOTE — PATIENT INSTRUCTIONS
Increase Effexor XR to 150 mg - new script sent -monitor for improvement in both mood and migraine frequency.  Continue Imitrex as needed.  Smoking cessation advised.    Follow up for annual physical - come fasting - 3 months.      Due for vaccines - pnuemonnia - flu, hep b, covid. Schedule when ready.

## 2022-12-21 NOTE — LETTER
December 21, 2022      Aubree Corcoran  [address] [place] [address]        Dear ,    We are writing to inform you of your test results.    {results letter list:386138}    No results found from the In Basket message.    If you have any questions or concerns, please call the clinic at the number listed above.       Sincerely,

## 2023-01-02 DIAGNOSIS — G43.009 MIGRAINE WITHOUT AURA AND WITHOUT STATUS MIGRAINOSUS, NOT INTRACTABLE: ICD-10-CM

## 2023-01-04 RX ORDER — SUMATRIPTAN 100 MG/1
TABLET, FILM COATED ORAL
Qty: 9 TABLET | Refills: 0 | Status: SHIPPED | OUTPATIENT
Start: 2023-01-04 | End: 2023-02-13

## 2023-01-04 NOTE — TELEPHONE ENCOUNTER
Imitrex       Last Written Prescription Date:  12/7/22  Last Fill Quantity: 9,   # refills: 0  Last Office Visit: 12/21/22  Future Office visit:

## 2023-01-10 ENCOUNTER — HOSPITAL ENCOUNTER (EMERGENCY)
Facility: HOSPITAL | Age: 38
Discharge: ANOTHER HOSPITAL | End: 2023-01-11
Attending: PHYSICIAN ASSISTANT | Admitting: PHYSICIAN ASSISTANT
Payer: COMMERCIAL

## 2023-01-10 ENCOUNTER — APPOINTMENT (OUTPATIENT)
Dept: CT IMAGING | Facility: HOSPITAL | Age: 38
End: 2023-01-10
Attending: PHYSICIAN ASSISTANT
Payer: COMMERCIAL

## 2023-01-10 ENCOUNTER — APPOINTMENT (OUTPATIENT)
Dept: ULTRASOUND IMAGING | Facility: HOSPITAL | Age: 38
End: 2023-01-10
Attending: PHYSICIAN ASSISTANT
Payer: COMMERCIAL

## 2023-01-10 DIAGNOSIS — K83.1 BILIARY STRICTURE (H): ICD-10-CM

## 2023-01-10 DIAGNOSIS — K80.50 CHOLEDOCHOLITHIASIS: ICD-10-CM

## 2023-01-10 DIAGNOSIS — R74.8 ELEVATED LIVER ENZYMES: ICD-10-CM

## 2023-01-10 DIAGNOSIS — R10.13 ABDOMINAL PAIN, EPIGASTRIC: ICD-10-CM

## 2023-01-10 LAB
ALBUMIN SERPL BCG-MCNC: 4.2 G/DL (ref 3.5–5.2)
ALBUMIN UR-MCNC: NEGATIVE MG/DL
ALP SERPL-CCNC: 90 U/L (ref 35–104)
ALT SERPL W P-5'-P-CCNC: 271 U/L (ref 10–35)
AMYLASE SERPL-CCNC: 23 U/L (ref 28–100)
ANION GAP SERPL CALCULATED.3IONS-SCNC: 10 MMOL/L (ref 7–15)
APPEARANCE UR: CLEAR
AST SERPL W P-5'-P-CCNC: 492 U/L (ref 10–35)
BASOPHILS # BLD AUTO: 0 10E3/UL (ref 0–0.2)
BASOPHILS NFR BLD AUTO: 0 %
BILIRUB DIRECT SERPL-MCNC: 1.59 MG/DL (ref 0–0.3)
BILIRUB SERPL-MCNC: 2.3 MG/DL
BILIRUB UR QL STRIP: ABNORMAL
BUN SERPL-MCNC: 20.8 MG/DL (ref 6–20)
CALCIUM SERPL-MCNC: 9.5 MG/DL (ref 8.6–10)
CHLORIDE SERPL-SCNC: 104 MMOL/L (ref 98–107)
COLOR UR AUTO: YELLOW
CREAT SERPL-MCNC: 0.69 MG/DL (ref 0.51–0.95)
DEPRECATED HCO3 PLAS-SCNC: 25 MMOL/L (ref 22–29)
EOSINOPHIL # BLD AUTO: 0.1 10E3/UL (ref 0–0.7)
EOSINOPHIL NFR BLD AUTO: 1 %
ERYTHROCYTE [DISTWIDTH] IN BLOOD BY AUTOMATED COUNT: 13.5 % (ref 10–15)
GFR SERPL CREATININE-BSD FRML MDRD: >90 ML/MIN/1.73M2
GLUCOSE SERPL-MCNC: 120 MG/DL (ref 70–99)
GLUCOSE UR STRIP-MCNC: NEGATIVE MG/DL
HCG UR QL: NEGATIVE
HCT VFR BLD AUTO: 42.2 % (ref 35–47)
HGB BLD-MCNC: 14.3 G/DL (ref 11.7–15.7)
HGB UR QL STRIP: NEGATIVE
HOLD SPECIMEN: NORMAL
IMM GRANULOCYTES # BLD: 0 10E3/UL
IMM GRANULOCYTES NFR BLD: 0 %
KETONES UR STRIP-MCNC: NEGATIVE MG/DL
LEUKOCYTE ESTERASE UR QL STRIP: NEGATIVE
LIPASE SERPL-CCNC: 25 U/L (ref 13–60)
LYMPHOCYTES # BLD AUTO: 2.4 10E3/UL (ref 0.8–5.3)
LYMPHOCYTES NFR BLD AUTO: 23 %
MCH RBC QN AUTO: 31.9 PG (ref 26.5–33)
MCHC RBC AUTO-ENTMCNC: 33.9 G/DL (ref 31.5–36.5)
MCV RBC AUTO: 94 FL (ref 78–100)
MONOCYTES # BLD AUTO: 0.7 10E3/UL (ref 0–1.3)
MONOCYTES NFR BLD AUTO: 6 %
NEUTROPHILS # BLD AUTO: 7.3 10E3/UL (ref 1.6–8.3)
NEUTROPHILS NFR BLD AUTO: 70 %
NITRATE UR QL: NEGATIVE
NRBC # BLD AUTO: 0 10E3/UL
NRBC BLD AUTO-RTO: 0 /100
PH UR STRIP: 7 [PH] (ref 4.7–8)
PLATELET # BLD AUTO: 294 10E3/UL (ref 150–450)
POTASSIUM SERPL-SCNC: 4 MMOL/L (ref 3.4–5.3)
PROT SERPL-MCNC: 6.6 G/DL (ref 6.4–8.3)
RBC # BLD AUTO: 4.48 10E6/UL (ref 3.8–5.2)
SODIUM SERPL-SCNC: 139 MMOL/L (ref 136–145)
SP GR UR STRIP: 1.01 (ref 1–1.03)
UROBILINOGEN UR STRIP-MCNC: 3 MG/DL
WBC # BLD AUTO: 10.5 10E3/UL (ref 4–11)

## 2023-01-10 PROCEDURE — 82150 ASSAY OF AMYLASE: CPT | Performed by: PHYSICIAN ASSISTANT

## 2023-01-10 PROCEDURE — 81025 URINE PREGNANCY TEST: CPT | Performed by: PHYSICIAN ASSISTANT

## 2023-01-10 PROCEDURE — 250N000011 HC RX IP 250 OP 636: Performed by: PHYSICIAN ASSISTANT

## 2023-01-10 PROCEDURE — 76705 ECHO EXAM OF ABDOMEN: CPT

## 2023-01-10 PROCEDURE — 74177 CT ABD & PELVIS W/CONTRAST: CPT

## 2023-01-10 PROCEDURE — 96374 THER/PROPH/DIAG INJ IV PUSH: CPT | Mod: XU

## 2023-01-10 PROCEDURE — 96375 TX/PRO/DX INJ NEW DRUG ADDON: CPT

## 2023-01-10 PROCEDURE — 99285 EMERGENCY DEPT VISIT HI MDM: CPT | Mod: 25

## 2023-01-10 PROCEDURE — 81003 URINALYSIS AUTO W/O SCOPE: CPT | Performed by: PHYSICIAN ASSISTANT

## 2023-01-10 PROCEDURE — 250N000013 HC RX MED GY IP 250 OP 250 PS 637: Performed by: PHYSICIAN ASSISTANT

## 2023-01-10 PROCEDURE — 250N000009 HC RX 250: Performed by: PHYSICIAN ASSISTANT

## 2023-01-10 PROCEDURE — 96376 TX/PRO/DX INJ SAME DRUG ADON: CPT

## 2023-01-10 PROCEDURE — 80053 COMPREHEN METABOLIC PANEL: CPT | Performed by: PHYSICIAN ASSISTANT

## 2023-01-10 PROCEDURE — 82248 BILIRUBIN DIRECT: CPT | Performed by: PHYSICIAN ASSISTANT

## 2023-01-10 PROCEDURE — 99284 EMERGENCY DEPT VISIT MOD MDM: CPT | Performed by: PHYSICIAN ASSISTANT

## 2023-01-10 PROCEDURE — 85004 AUTOMATED DIFF WBC COUNT: CPT | Performed by: PHYSICIAN ASSISTANT

## 2023-01-10 PROCEDURE — 83690 ASSAY OF LIPASE: CPT | Performed by: PHYSICIAN ASSISTANT

## 2023-01-10 RX ORDER — IOPAMIDOL 755 MG/ML
89 INJECTION, SOLUTION INTRAVASCULAR ONCE
Status: COMPLETED | OUTPATIENT
Start: 2023-01-10 | End: 2023-01-10

## 2023-01-10 RX ORDER — ONDANSETRON 2 MG/ML
4 INJECTION INTRAMUSCULAR; INTRAVENOUS ONCE
Status: COMPLETED | OUTPATIENT
Start: 2023-01-10 | End: 2023-01-10

## 2023-01-10 RX ORDER — MORPHINE SULFATE 2 MG/ML
2 INJECTION, SOLUTION INTRAMUSCULAR; INTRAVENOUS EVERY 4 HOURS PRN
Status: DISCONTINUED | OUTPATIENT
Start: 2023-01-10 | End: 2023-01-11 | Stop reason: HOSPADM

## 2023-01-10 RX ADMIN — ALUMINUM HYDROXIDE, MAGNESIUM HYDROXIDE, AND SIMETHICONE 30 ML: 200; 200; 20 SUSPENSION ORAL at 16:02

## 2023-01-10 RX ADMIN — IOPAMIDOL 89 ML: 755 INJECTION, SOLUTION INTRAVENOUS at 20:21

## 2023-01-10 RX ADMIN — ONDANSETRON 4 MG: 2 INJECTION INTRAMUSCULAR; INTRAVENOUS at 15:57

## 2023-01-10 RX ADMIN — MORPHINE SULFATE 2 MG: 2 INJECTION, SOLUTION INTRAMUSCULAR; INTRAVENOUS at 15:57

## 2023-01-10 RX ADMIN — MORPHINE SULFATE 2 MG: 2 INJECTION, SOLUTION INTRAMUSCULAR; INTRAVENOUS at 22:39

## 2023-01-10 ASSESSMENT — ACTIVITIES OF DAILY LIVING (ADL)
ADLS_ACUITY_SCORE: 35

## 2023-01-10 NOTE — ED NOTES
Lidia Small daughter in law has some questions regarding putting Penny Barragan in a nursing home  She wants to speak with a nurse  She is looking for some guidance        Monica Vee [phone] Pt presents with epigastric pain, n/v onset 0300 today.  Pt states this feels like a gallbladder attack she used to have, gallbladder has since been removed.  Denies CP, SOB, fevers, urinary symptoms. Pt rates pain 10/10 and a constant stabbing.

## 2023-01-11 ENCOUNTER — TRANSFERRED RECORDS (OUTPATIENT)
Dept: HEALTH INFORMATION MANAGEMENT | Facility: CLINIC | Age: 38
End: 2023-01-11

## 2023-01-11 ENCOUNTER — APPOINTMENT (OUTPATIENT)
Dept: MRI IMAGING | Facility: HOSPITAL | Age: 38
End: 2023-01-11
Attending: PHYSICIAN ASSISTANT
Payer: COMMERCIAL

## 2023-01-11 VITALS
BODY MASS INDEX: 35.34 KG/M2 | RESPIRATION RATE: 16 BRPM | HEART RATE: 93 BPM | OXYGEN SATURATION: 97 % | TEMPERATURE: 96.2 F | SYSTOLIC BLOOD PRESSURE: 106 MMHG | WEIGHT: 180 LBS | DIASTOLIC BLOOD PRESSURE: 95 MMHG | HEIGHT: 60 IN

## 2023-01-11 LAB
ALBUMIN SERPL BCG-MCNC: 3.8 G/DL (ref 3.5–5.2)
ALP SERPL-CCNC: 109 U/L (ref 35–104)
ALT SERPL W P-5'-P-CCNC: 362 U/L (ref 10–35)
AST SERPL W P-5'-P-CCNC: 394 U/L (ref 10–35)
BILIRUB DIRECT SERPL-MCNC: 1.34 MG/DL (ref 0–0.3)
BILIRUB SERPL-MCNC: 2.1 MG/DL
PROT SERPL-MCNC: 6.3 G/DL (ref 6.4–8.3)

## 2023-01-11 PROCEDURE — 36415 COLL VENOUS BLD VENIPUNCTURE: CPT | Performed by: PHYSICIAN ASSISTANT

## 2023-01-11 PROCEDURE — 96376 TX/PRO/DX INJ SAME DRUG ADON: CPT

## 2023-01-11 PROCEDURE — 250N000013 HC RX MED GY IP 250 OP 250 PS 637: Performed by: STUDENT IN AN ORGANIZED HEALTH CARE EDUCATION/TRAINING PROGRAM

## 2023-01-11 PROCEDURE — 250N000011 HC RX IP 250 OP 636: Performed by: PHYSICIAN ASSISTANT

## 2023-01-11 PROCEDURE — 250N000011 HC RX IP 250 OP 636: Performed by: STUDENT IN AN ORGANIZED HEALTH CARE EDUCATION/TRAINING PROGRAM

## 2023-01-11 PROCEDURE — 80076 HEPATIC FUNCTION PANEL: CPT | Performed by: PHYSICIAN ASSISTANT

## 2023-01-11 PROCEDURE — 74181 MRI ABDOMEN W/O CONTRAST: CPT

## 2023-01-11 RX ORDER — ONDANSETRON 4 MG/1
4 TABLET, ORALLY DISINTEGRATING ORAL ONCE
Status: COMPLETED | OUTPATIENT
Start: 2023-01-11 | End: 2023-01-11

## 2023-01-11 RX ORDER — OXYCODONE HYDROCHLORIDE 5 MG/1
5 TABLET ORAL ONCE
Status: COMPLETED | OUTPATIENT
Start: 2023-01-11 | End: 2023-01-11

## 2023-01-11 RX ADMIN — ONDANSETRON 4 MG: 4 TABLET, ORALLY DISINTEGRATING ORAL at 10:07

## 2023-01-11 RX ADMIN — MORPHINE SULFATE 2 MG: 2 INJECTION, SOLUTION INTRAMUSCULAR; INTRAVENOUS at 13:17

## 2023-01-11 RX ADMIN — OXYCODONE HYDROCHLORIDE 5 MG: 5 TABLET ORAL at 10:07

## 2023-01-11 ASSESSMENT — ACTIVITIES OF DAILY LIVING (ADL)
ADLS_ACUITY_SCORE: 35

## 2023-01-11 NOTE — ED PROVIDER NOTES
History     Chief Complaint   Patient presents with     Abdominal Pain     C/o abd pain, nausea and vomiting since 3 am. Notes pain getting worse.      The history is provided by the patient and a relative.     Aubree Corcoran is a 37 year old female who presents with mid epigastric abdominal pain, nausea, and vomiting.  She noticed it starting a little bit a few days ago, but then it became much more severe this morning around 3 am.  She has had her gallbladder removed, about 2 years ago.  She does not drink alcohol.  No diarrhea or constipation.      Allergies:  Allergies   Allergen Reactions     Linaclotide      diarrhea       Problem List:    Patient Active Problem List    Diagnosis Date Noted     Acute cholecystitis 02/27/2021     Priority: Medium     Added automatically from request for surgery [id number]       Single current episode of major depressive disorder, unspecified depression episode severity 09/06/2018     Priority: Medium     Anxiety 09/06/2018     Priority: Medium     Insomnia, unspecified type 09/06/2018     Priority: Medium     ACP (advance care planning) 04/07/2017     Priority: Medium     Advance Care Planning 4/7/2017: ACP Review of Chart / Resources Provided:  Reviewed chart for advance care plan.  Aubree Corcoran has no plan or code status on file. Discussed available resources and provided with information.   Added by Jane Osullivan             Tobacco use disorder 04/07/2017     Priority: Medium     Migraine without aura and without status migrainosus, not intractable 04/07/2017     Priority: Medium     Gastroesophageal reflux disease, esophagitis presence not specified 04/07/2017     Priority: Medium     IMO Regulatory Load OCT 2020       Irritable bowel syndrome with both constipation and diarrhea 04/07/2017     Priority: Medium     Family history of heart disease in male family member before age 55 04/07/2017     Priority: Medium     Family history of cardiac disorder 10/01/2015      Priority: Medium     Headache disorder 10/27/2014     Priority: Medium     Meningitis, unspecified(322.9) 08/19/2013     Priority: Medium     Replacing diagnoses that were inactivated after the 10/1/2021 regulatory import.       Irritable bowel syndrome 12/29/2011     Priority: Medium     Tobacco user 03/10/2011     Priority: Medium     Family planning 11/09/2010     Priority: Medium     Migraine headache 12/08/2006     Priority: Medium     Overview:   IMO Update 10/11          Past Medical History:    Past Medical History:   Diagnosis Date     Family history of heart disease in female family member before age 65 04/07/2017     Family history of heart disease in male family member before age 55 04/07/2017     Gastroesophageal reflux disease, esophagitis presence not specified 04/07/2017     Irritable bowel syndrome with both constipation and diarrhea 04/07/2017     Migraine without aura and without status migrainosus, not intractable 04/07/2017     Tobacco use disorder        Past Surgical History:    Past Surgical History:   Procedure Laterality Date     LAPAROSCOPIC CHOLECYSTECTOMY N/A 2/27/2021    Procedure: LAPAROSCOPIC CHOLECYSTECTOMY,;  Surgeon: Delmar French DO;  Location: [facility] OR     ORTHOPEDIC SURGERY  2002    ankle surgery- bone spur removal       Family History:    Family History   Problem Relation Age of Onset     Heart Disease Mother         8 stents; onset 40s?     Chronic Obstructive Pulmonary Disease Mother      Cancer Father         mouth and throat cancer     Heart Disease Brother         stents; age 37; occlusive disease     Bladder Cancer Brother        Social History:  Marital Status:  Single [1]  Social History     Tobacco Use     Smoking status: Every Day     Packs/day: 0.50     Years: 15.00     Pack years: 7.50     Types: Cigarettes     Smokeless tobacco: Never     Tobacco comments:     pt denies quit plan 6/17/2022   Vaping Use     Vaping Use: Never used   Substance Use Topics      Alcohol use: No     Drug use: No        Medications:    albuterol (PROAIR HFA/PROVENTIL HFA/VENTOLIN HFA) 108 (90 Base) MCG/ACT inhaler  ALLERGY RELIEF/NASAL DECONGEST  MG 24 hr tablet  clotrimazole (LOTRIMIN) 1 % external solution  hydrOXYzine (VISTARIL) 25 MG capsule  pantoprazole (PROTONIX) 40 MG EC tablet  prochlorperazine (COMPAZINE) 5 MG tablet  SUMAtriptan (IMITREX) 100 MG tablet  venlafaxine (EFFEXOR XR) 150 MG 24 hr capsule      Review of Systems   Constitutional: Positive for chills and fever. Negative for activity change.   HENT: Negative.    Respiratory: Negative.    Cardiovascular: Negative.    Gastrointestinal: Positive for abdominal pain, nausea and vomiting.   Genitourinary: Negative.    Skin: Negative.        Physical Exam   BP: 100/61  Pulse: 95  Temp: (!) 96.2  F (35.7  C)  Resp: 16  Height: 152.4 cm (5')  Weight: 81.6 kg (180 lb)  SpO2: 99 %    Physical Exam  Vitals and nursing note reviewed.   Constitutional:       General: She is not in acute distress.     Appearance: She is well-developed. She is obese. She is diaphoretic. She is not ill-appearing or toxic-appearing.   HENT:      Head: Normocephalic.   Cardiovascular:      Rate and Rhythm: Normal rate.      Heart sounds: Normal heart sounds.   Pulmonary:      Effort: Pulmonary effort is normal.      Breath sounds: Normal breath sounds.   Abdominal:      General: Abdomen is flat. Bowel sounds are normal. There is no distension.      Palpations: Abdomen is soft.      Tenderness: There is abdominal tenderness in the epigastric area.      Hernia: No hernia is present.   Neurological:      Mental Status: She is alert.       ED Course     Results for orders placed or performed during the hospital encounter of 01/10/23 (from the past 24 hour(s))   CBC with platelets differential    Narrative    The following orders were created for panel order CBC with platelets differential.  Procedure                               Abnormality         Status                      ---------                               -----------         ------                     CBC with platelets and d...[[id number]]                      Final result                 Please view results for these tests on the individual orders.   Lipase   Result Value Ref Range    Lipase 25 13 - 60 U/L   Basic metabolic panel   Result Value Ref Range    Sodium 139 136 - 145 mmol/L    Potassium 4.0 3.4 - 5.3 mmol/L    Chloride 104 98 - 107 mmol/L    Carbon Dioxide (CO2) 25 22 - 29 mmol/L    Anion Gap 10 7 - 15 mmol/L    Urea Nitrogen 20.8 (H) 6.0 - 20.0 mg/dL    Creatinine 0.69 0.51 - 0.95 mg/dL    Calcium 9.5 8.6 - 10.0 mg/dL    Glucose 120 (H) 70 - 99 mg/dL    GFR Estimate >90 >60 mL/min/1.73m2   Hepatic panel   Result Value Ref Range    Protein Total 6.6 6.4 - 8.3 g/dL    Albumin 4.2 3.5 - 5.2 g/dL    Bilirubin Total 2.3 (H) <=1.2 mg/dL    Alkaline Phosphatase 90 35 - 104 U/L     (H) 10 - 35 U/L     (H) 10 - 35 U/L    Bilirubin Direct 1.59 (H) 0.00 - 0.30 mg/dL   Amylase   Result Value Ref Range    Amylase 23 (L) 28 - 100 U/L   CBC with platelets and differential   Result Value Ref Range    WBC Count 10.5 4.0 - 11.0 10e3/uL    RBC Count 4.48 3.80 - 5.20 10e6/uL    Hemoglobin 14.3 11.7 - 15.7 g/dL    Hematocrit 42.2 35.0 - 47.0 %    MCV 94 78 - 100 fL    MCH 31.9 26.5 - 33.0 pg    MCHC 33.9 31.5 - 36.5 g/dL    RDW 13.5 10.0 - 15.0 %    Platelet Count 294 150 - 450 10e3/uL    % Neutrophils 70 %    % Lymphocytes 23 %    % Monocytes 6 %    % Eosinophils 1 %    % Basophils 0 %    % Immature Granulocytes 0 %    NRBCs per 100 WBC 0 <1 /100    Absolute Neutrophils 7.3 1.6 - 8.3 10e3/uL    Absolute Lymphocytes 2.4 0.8 - 5.3 10e3/uL    Absolute Monocytes 0.7 0.0 - 1.3 10e3/uL    Absolute Eosinophils 0.1 0.0 - 0.7 10e3/uL    Absolute Basophils 0.0 0.0 - 0.2 10e3/uL    Absolute Immature Granulocytes 0.0 <=0.4 10e3/uL    Absolute NRBCs 0.0 10e3/uL   Extra Tube    Narrative    The following orders were  created for panel order Extra Tube.  Procedure                               Abnormality         Status                     ---------                               -----------         ------                     Extra Blue Top Tube[[id number]]                              Final result               Extra Red Top Tube[[id number]]                               Final result               Extra Green Top (Lithium...[[id number]]                      Final result                 Please view results for these tests on the individual orders.   Extra Blue Top Tube   Result Value Ref Range    Hold Specimen JIC    Extra Red Top Tube   Result Value Ref Range    Hold Specimen JIC    Extra Green Top (Lithium Heparin) ON ICE   Result Value Ref Range    Hold Specimen JIC    US Abdomen Limited    Narrative    Exam: US ABDOMEN LIMITED    Exam reason: elevated liver enzymes    Technique: Grayscale ultrasound images of the abdomen were obtained.    Comparison: None.    FINDINGS:    Aorta: No abdominal aortic aneurysm.    Inferior Vena Cava: Visualized portions appear normal.    Pancreas: Visualized portions appear normal.    Liver: Normal size and echogenicity. No focal solid masses.     Gallbladder: Prior cholecystectomy.    Biliary Ducts: Common bile duct (CBD) is 6 mm.  No intrahepatic or  extrahepatic ductal dilatation.    Right kidney: 11.3 cm long. The right kidney is normal in size and  echogenicity. No solid masses, calculi or hydronephrosis.                 Impression    IMPRESSION:     Prior cholecystectomy. No bile duct dilation. Normal echogenicity of  the liver.    ESPERANZA ZHU MD         SYSTEM ID:  [id number]   HCG qualitative urine   Result Value Ref Range    hCG Urine Qualitative Negative Negative   UA reflex to Microscopic and Culture    Specimen: Urine, Clean Catch   Result Value Ref Range    Color Urine Yellow Colorless, Straw, Light Yellow, Yellow    Appearance Urine Clear Clear    Glucose Urine Negative Negative  mg/dL    Bilirubin Urine Small (A) Negative    Ketones Urine Negative Negative mg/dL    Specific Gravity Urine 1.015 1.003 - 1.035    Blood Urine Negative Negative    pH Urine 7.0 4.7 - 8.0    Protein Albumin Urine Negative Negative mg/dL    Urobilinogen Urine 3.0 (A) Normal, 2.0 mg/dL    Nitrite Urine Negative Negative    Leukocyte Esterase Urine Negative Negative    Narrative    Microscopic not indicated   CT Abdomen Pelvis w Contrast    Narrative    Exam: CT ABDOMEN PELVIS W CONTRAST    Exam reason: ongoing abdominal pain, sweats    Technique: Using helical CT technique, axial images of the abdomen and  pelvis were obtained with IV contrast.  Coronal and sagittal  reconstructions also performed. This CT was performed using one or  more of the following dose reduction techniques: automated exposure  control, adjustment of the mA and/or kV according to patient size,  and/or use of iterative reconstruction technique.    Meds/Contrast: isovue 370 89    Comparison: None.     FINDINGS:    ABDOMEN:    Liver: No mass or any significant abnormality.  Gallbladder: Resected.   Bile Ducts: No biliary ductal dilation.   Spleen:  No splenomegaly or focal lesion.  Pancreas: No mass, ductal dilatation, or inflammatory changes.  Kidneys: No hydronephrosis. No definite solid mass. There is a 3 mm  nonobstructing calculus in the lower pole of the left kidney. There is  a 10 mm benign cyst in the mid right kidney.   Adrenals:  No nodules.   Lymph Nodes: No adenopathy.   Vascular: No aortic aneurysm.   Abdominal Wall: No acute findings.     PELVIS:   No mass or adenopathy.     Bowel/Mesentery/Peritoneum:   -No bowel obstruction.   -Normal appendix.  -No acute inflammatory findings.  -No ascites.    Visualized portions of the Chest: No pleural effusion or significant  findings.  Musculoskeletal: No acute osseous abnormality.       Impression    IMPRESSION:  No acute findings in the abdomen or pelvis.    There is a 3 mm nonobstructing  calculus in the lower pole of the left  kidney. No hydronephrosis.    ESPERANZA ZHU MD         SYSTEM ID:  [id number]   Hepatic panel   Result Value Ref Range    Protein Total 6.3 (L) 6.4 - 8.3 g/dL    Albumin 3.8 3.5 - 5.2 g/dL    Bilirubin Total 2.1 (H) <=1.2 mg/dL    Alkaline Phosphatase 109 (H) 35 - 104 U/L     (H) 10 - 35 U/L     (H) 10 - 35 U/L    Bilirubin Direct 1.34 (H) 0.00 - 0.30 mg/dL   MR Abdomen MRCP without Contrast    Narrative    MR ABDOMEN MRCP W/O CONTRAST, 1/11/2023 7:23 AM    CLINICAL HISTORY: Female, age 37 years,  ?biliary stricture, sludge in  biliary tree;    Comparison:  CT scan of the pelvis and ultrasound of the abdomen  1/10/2023    TECHNIQUE: Coronal T2, gradient echo; axial T2, T2 fat saturation, T1  in and out of phase and MRCP with 3-D reconstructions..    FINDINGS:  There is a 3.9 to 5.5 mm low signal filling defect seen within distal  aspects of the common duct likely representing a nonobstructing stone.    Common duct measures approximately 5.9 mm in maximal dimension. The  intrahepatic portions of the biliary tree are unremarkable.     Lung bases and visualized portions of the heart are unremarkable.    Stomach and duodenum demonstrate no acute abnormality.    The liver and spleen are also grossly unremarkable.    Pancreas is unremarkable. Kidneys are also unremarkable.    Lymph nodes in the upper abdomen are unremarkable. Visualized bony  structures are unremarkable.    Other Findings:  Skin and subcutaneous fat are grossly normal.        Impression    IMPRESSION:   4 to 5.5 mm nonobstructing distal common duct stone with mild common  duct dilatation. The common duct currently attains a maximum diameter  of 5.9 mm.    No distinct evidence of bile duct stricture.    Surgical absence of the gallbladder.    NANCY STOUT MD         SYSTEM ID:  [id number]       Medications   morphine (PF) injection 2 mg (2 mg Intravenous Given 1/10/23 2239)   ondansetron (ZOFRAN)  injection 4 mg (4 mg Intravenous Given 1/10/23 1557)   lidocaine (viscous) (XYLOCAINE) 2 % 15 mL, alum & mag hydroxide-simethicone (MAALOX) 15 mL GI Cocktail (30 mLs Oral Given 1/10/23 1602)   iopamidol (ISOVUE-370) solution 89 mL (89 mLs Intravenous Given 1/10/23 2021)   sodium chloride (PF) 0.9% PF flush 60 mL (60 mLs Intravenous Given 1/10/23 2021)   ondansetron (ZOFRAN ODT) ODT tab 4 mg (4 mg Oral Given 1/11/23 1007)   oxyCODONE (ROXICODONE) tablet 5 mg (5 mg Oral Given 1/11/23 1007)     Assessments & Plan (with Medical Decision Making)     I have reviewed the nursing notes.    I have reviewed the findings, diagnosis, plan and need for follow up with the patient.    Medical Decision Making    New Prescriptions    No medications on file     Final diagnoses:   Biliary stricture   Elevated liver enzymes   Abdominal pain, epigastric   Choledocholithiasis   36 yo female, hx cholecystectomy 2 years ago, with epigastric pain and elevated ALT/AST/tbili with no alcohol use.  Negative US for ductal dilation, negative CT abdomen for any other acute process.  Discussed case with Dr. Joaquin ([facility]), recommend repeat LFTs in 8-12 hours and MRCP in the morning, then discuss findings with the GI team for further need for intervention.  She may require EUS/ERCP depending on those findings, which will be available at [facility] on Thursday.  Discussed with Dr. Jolly as well as the patient and they are in agreement with boarding in the ER [facility], with the plan to complete those tests in the morning.      1/10/2023   [facility]     Lance Ramos PA-C  01/10/23 3676       Dillan Munguia MD  01/11/23 1498

## 2023-01-11 NOTE — ED NOTES
[facility] room number still pending will call us when room/ unit is ready for pt.

## 2023-01-11 NOTE — ED NOTES
[facility]   Arrival @ 1400  Accepting [facility]  Room # 700 Bed 1  Report # [phone]  Fax 3

## 2023-01-11 NOTE — ED NOTES
Face to face report given with opportunity to observe patient.    Report given to AUSTEN Paz RN   1/10/2023  7:09 PM

## 2023-01-11 NOTE — ED NOTES
Patient aware of transfer, all questions answered. Patient went private vehicle, OK to leave IV in per accepting facility and Dr. Munguia. Patient reporting decreased pain since morphine admin. Report called to receiving facility, patient left via wheelchair.

## 2023-01-12 ENCOUNTER — TRANSFERRED RECORDS (OUTPATIENT)
Dept: HEALTH INFORMATION MANAGEMENT | Facility: CLINIC | Age: 38
End: 2023-01-12

## 2023-01-13 ENCOUNTER — TELEPHONE (OUTPATIENT)
Dept: FAMILY MEDICINE | Facility: OTHER | Age: 38
End: 2023-01-13

## 2023-01-13 NOTE — TELEPHONE ENCOUNTER
11:44 AM    Reason for Call: OVERBOOK    Patient ishospital follow  / [facility] / DOD 1-13-23 / vomiting with known gallstones, nurse called    The patient is requesting an appointment for with in 7-10 days of DOD with Dr. Yanez.    Was an appointment offered for this call? No  If yes : Appointment type              Date    Preferred method for responding to this message: Telephone Call  What is your phone number ? [phone]    If we cannot reach you directly, may we leave a detailed response at the number you provided? Yes    Can this message wait until your PCP/provider returns, if unavailable today? Maribel Chatterjee

## 2023-01-20 NOTE — PROGRESS NOTES
"  Assessment & Plan     Choledocholithiasis  S/p ERCP.  Symptoms have resolved.  - Hepatic panel (Albumin, ALT, AST, Bili, Alk Phos, TP); Future    Elevated liver enzymes  Repeating today per discharge summary instructions.  - Hepatic panel (Albumin, ALT, AST, Bili, Alk Phos, TP); Future    Migraine without aura and without status migrainosus, not intractable  Improved with Effexor XR.  Chronic decongestant use.  If stops - gets headache.  Suggest tapering it - 1 tab alternating with 1/2, then 1/2 daily, etc until stop or prn use.  - loratadine-pseudoePHEDrine (ALLERGY RELIEF/NASAL DECONGEST)  MG 24 hr tablet; Take 1 tablet by mouth daily    Chronic nasal congestion  As above.  - loratadine-pseudoePHEDrine (ALLERGY RELIEF/NASAL DECONGEST)  MG 24 hr tablet; Take 1 tablet by mouth daily    Encounter for surveillance of injectable contraceptive  Given Depo Provera today.  - medroxyPROGESTERone (DEPO-PROVERA) injection 150 mg           MED REC REQUIRED  Post Medication Reconciliation Status: discharge medications reconciled, continue medications without change  Nicotine/Tobacco Cessation:  She reports that she has been smoking cigarettes. She has a 7.50 pack-year smoking history. She has never used smokeless tobacco.  Nicotine/Tobacco Cessation Plan:   Information offered: Patient not interested at this time      BMI:   Estimated body mass index is 31.46 kg/m  as calculated from the following:    Height as of this encounter: 1.626 m (5' 4\").    Weight as of this encounter: 83.1 kg (183 lb 4.8 oz).   Weight management plan: Discussed healthy diet and exercise guidelines    Patient Instructions   Try alternating full tab with 1/2 tab of Sudafed.  If ok, go to 1/2 tab daily.  Goal to taper use - discontinue.    Lab today - will call with results.    Depo shot completed.      No follow-ups on file.    Kendra Bailey MD  [facility] - MONIQUE Mak is a 37 year old, presenting " "for the following health issues:  Hospital F/U      Lists of hospitals in the [place]         -Would like her depo shot, has appt this Wednesday would like it earlier.     ED/UC Followup:    Facility:  [facility] [place] [facility] - transferred to [facility]  Date of visit: 1/10/2023; discharge 1/13/23  Reason for visit: Biliary stricture, elevated liver enzymes - abdominal pain, vomiting  Current Status: feel good, no pain    Prior cholecystectomy.    US and CT done.  Negative.    MRCP - 4-5 mm nonobstructing distal CBD stone, mild dilation.  Max diameter 5.9 mm.  No stricture.  No gallbladder.    LFTS 300-400s.  Bili 1.59, 1.34    ER consulted Dr Joaquin - MARCOS Flynn - via phone.    Transferred to [facility] in the morning.  Dr Campos.    ERCP- 1/12/23.  Removed stone.    Follow up as needed.    Review of Systems   Constitutional, HEENT, cardiovascular, pulmonary, gi and gu systems are negative, except as otherwise noted.      Objective    /86 (BP Location: Left arm, Patient Position: Sitting, Cuff Size: Adult Regular)   Pulse 112   Temp 98.2  F (36.8  C) (Tympanic)   Ht 1.626 m (5' 4\")   Wt 83.1 kg (183 lb 4.8 oz)   SpO2 96%   BMI 31.46 kg/m    Body mass index is 31.46 kg/m .  Physical Exam   GENERAL: healthy, alert, no distress and over weight  EYES: Eyes grossly normal to inspection, PERRL and conjunctivae and sclerae normal  NECK: no adenopathy, no asymmetry, masses, or scars and thyroid normal to palpation  RESP: lungs clear to auscultation - no rales, rhonchi or wheezes  CV: regular rate and rhythm, normal S1 S2, no S3 or S4, no murmur, click or rub, no peripheral edema and peripheral pulses strong  ABDOMEN: soft, nontender, no hepatosplenomegaly, no masses and bowel sounds normal  MS: no gross musculoskeletal defects noted, no edema  NEURO: Normal strength and tone, mentation intact and speech normal  SKIN: non jaundiced  PSYCH: mentation appears normal, affect normal/bright    Hepatic panel pending.                "

## 2023-01-23 ENCOUNTER — OFFICE VISIT (OUTPATIENT)
Dept: FAMILY MEDICINE | Facility: OTHER | Age: 38
End: 2023-01-23
Attending: FAMILY MEDICINE
Payer: COMMERCIAL

## 2023-01-23 VITALS
SYSTOLIC BLOOD PRESSURE: 110 MMHG | OXYGEN SATURATION: 96 % | DIASTOLIC BLOOD PRESSURE: 86 MMHG | HEART RATE: 112 BPM | WEIGHT: 183.3 LBS | BODY MASS INDEX: 31.29 KG/M2 | HEIGHT: 64 IN | TEMPERATURE: 98.2 F

## 2023-01-23 DIAGNOSIS — Z30.42 ENCOUNTER FOR SURVEILLANCE OF INJECTABLE CONTRACEPTIVE: ICD-10-CM

## 2023-01-23 DIAGNOSIS — K80.50 CHOLEDOCHOLITHIASIS: Primary | ICD-10-CM

## 2023-01-23 DIAGNOSIS — R09.81 CHRONIC NASAL CONGESTION: ICD-10-CM

## 2023-01-23 DIAGNOSIS — G43.009 MIGRAINE WITHOUT AURA AND WITHOUT STATUS MIGRAINOSUS, NOT INTRACTABLE: ICD-10-CM

## 2023-01-23 DIAGNOSIS — R74.8 ELEVATED LIVER ENZYMES: ICD-10-CM

## 2023-01-23 LAB
ALBUMIN SERPL BCG-MCNC: 4.4 G/DL (ref 3.5–5.2)
ALP SERPL-CCNC: 92 U/L (ref 35–104)
ALT SERPL W P-5'-P-CCNC: 26 U/L (ref 10–35)
AST SERPL W P-5'-P-CCNC: 18 U/L (ref 10–35)
BILIRUB DIRECT SERPL-MCNC: <0.2 MG/DL (ref 0–0.3)
BILIRUB SERPL-MCNC: 0.9 MG/DL
PROT SERPL-MCNC: 7 G/DL (ref 6.4–8.3)

## 2023-01-23 PROCEDURE — 250N000011 HC RX IP 250 OP 636: Performed by: FAMILY MEDICINE

## 2023-01-23 PROCEDURE — G0463 HOSPITAL OUTPT CLINIC VISIT: HCPCS | Mod: 25

## 2023-01-23 PROCEDURE — 99213 OFFICE O/P EST LOW 20 MIN: CPT | Performed by: FAMILY MEDICINE

## 2023-01-23 PROCEDURE — G0463 HOSPITAL OUTPT CLINIC VISIT: HCPCS

## 2023-01-23 PROCEDURE — 82040 ASSAY OF SERUM ALBUMIN: CPT | Mod: ZL | Performed by: FAMILY MEDICINE

## 2023-01-23 PROCEDURE — 96372 THER/PROPH/DIAG INJ SC/IM: CPT | Performed by: FAMILY MEDICINE

## 2023-01-23 PROCEDURE — 36415 COLL VENOUS BLD VENIPUNCTURE: CPT | Mod: ZL | Performed by: FAMILY MEDICINE

## 2023-01-23 RX ORDER — MEDROXYPROGESTERONE ACETATE 150 MG/ML
150 INJECTION, SUSPENSION INTRAMUSCULAR
Status: COMPLETED | OUTPATIENT
Start: 2023-01-23 | End: 2023-09-18

## 2023-01-23 RX ORDER — LORATADINE AND PSEUDOEPHEDRINE SULFATE 10; 240 MG/1; MG/1
1 TABLET, FILM COATED, EXTENDED RELEASE ORAL DAILY
Qty: 30 TABLET | Refills: 0 | Status: SHIPPED | OUTPATIENT
Start: 2023-01-23 | End: 2023-05-24

## 2023-01-23 RX ADMIN — MEDROXYPROGESTERONE ACETATE 150 MG: 150 INJECTION, SUSPENSION INTRAMUSCULAR at 11:19

## 2023-01-23 ASSESSMENT — PAIN SCALES - GENERAL: PAINLEVEL: NO PAIN (0)

## 2023-01-23 NOTE — PATIENT INSTRUCTIONS
Try alternating full tab with 1/2 tab of Sudafed.  If ok, go to 1/2 tab daily.  Goal to taper use - discontinue.    Lab today - will call with results.    Depo shot completed.

## 2023-01-23 NOTE — NURSING NOTE
Clinic Administered Medication Documentation          Depo Provera Documentation    URINE HCG: negative    Depo-Provera Standing Order inclusion/exclusion criteria reviewed.   Patient meets: inclusion criteria     BP: 110/86  LAST PAP/EXAM:   Lab Results   Component Value Date    PAP NIL 09/04/2019       Prior to injection, verified patient identity using patient's name and date of birth. Medication was administered. Please see MAR and medication order for additional information.     Was entire vial of medication used? Yes  Vial/Syringe: Single dose vial  Expiration Date:  SEP 2023    Patient instructed to remain in clinic for 15 minutes, report any adverse reaction to staff immediately  and stay in clinic after the injection but patient declined.  NEXT INJECTION DUE: 4/10/23 - 4/24/23

## 2023-02-13 DIAGNOSIS — G43.009 MIGRAINE WITHOUT AURA AND WITHOUT STATUS MIGRAINOSUS, NOT INTRACTABLE: ICD-10-CM

## 2023-02-13 RX ORDER — SUMATRIPTAN 100 MG/1
TABLET, FILM COATED ORAL
Qty: 9 TABLET | Refills: 0 | Status: SHIPPED | OUTPATIENT
Start: 2023-02-13 | End: 2023-03-27

## 2023-02-13 NOTE — TELEPHONE ENCOUNTER
Sumatriptan (Imitrex) 100 MG tablet    Last Written Prescription Date:  01/04/2023  Last Fill Quantity: 9,   # refills: 0  Last Office Visit:  01/13/2023  Future Office visit:

## 2023-02-20 DIAGNOSIS — G47.00 INSOMNIA, UNSPECIFIED TYPE: ICD-10-CM

## 2023-02-20 DIAGNOSIS — F41.9 ANXIETY: ICD-10-CM

## 2023-02-22 RX ORDER — HYDROXYZINE PAMOATE 25 MG/1
CAPSULE ORAL
Qty: 120 CAPSULE | Refills: 0 | Status: SHIPPED | OUTPATIENT
Start: 2023-02-22 | End: 2023-03-27

## 2023-03-24 ENCOUNTER — MYC MEDICAL ADVICE (OUTPATIENT)
Dept: FAMILY MEDICINE | Facility: OTHER | Age: 38
End: 2023-03-24

## 2023-03-24 DIAGNOSIS — B37.31 YEAST INFECTION OF THE VAGINA: Primary | ICD-10-CM

## 2023-03-24 RX ORDER — FLUCONAZOLE 150 MG/1
150 TABLET ORAL ONCE
Qty: 1 TABLET | Refills: 0 | Status: SHIPPED | OUTPATIENT
Start: 2023-03-24 | End: 2023-03-24

## 2023-03-27 DIAGNOSIS — G47.00 INSOMNIA, UNSPECIFIED TYPE: ICD-10-CM

## 2023-03-27 DIAGNOSIS — F41.9 ANXIETY: ICD-10-CM

## 2023-03-27 DIAGNOSIS — G43.009 MIGRAINE WITHOUT AURA AND WITHOUT STATUS MIGRAINOSUS, NOT INTRACTABLE: ICD-10-CM

## 2023-03-27 RX ORDER — HYDROXYZINE PAMOATE 25 MG/1
CAPSULE ORAL
Qty: 120 CAPSULE | Refills: 3 | Status: SHIPPED | OUTPATIENT
Start: 2023-03-27 | End: 2023-09-22

## 2023-03-27 RX ORDER — SUMATRIPTAN 100 MG/1
TABLET, FILM COATED ORAL
Qty: 9 TABLET | Refills: 0 | Status: SHIPPED | OUTPATIENT
Start: 2023-03-27 | End: 2023-05-03

## 2023-03-27 NOTE — TELEPHONE ENCOUNTER
hydrOXYzine (VISTARIL) 25 MG capsule  Last Written Prescription Date:  2-22-23  Last Fill Quantity: 120,   # refills: 0  Last Office Visit: 1-23-23  Future Office visit:    Next 5 appointments (look out 90 days)    May 18, 2023  8:15 AM  (Arrive by 8:00 AM)  PHYSICAL with Kendra Yanez MD  [facility] [address] [facility] [address]  [phone]           Routing refill request to provider for review/approval because:  Drug not on the [facility], [facility] or [facility] refill protocol or controlled substance     SUMAtriptan (IMITREX) 100 MG tablet     Last Written Prescription Date:  2-13-23  Last Fill Quantity: 90,   # refills: 0  Last Office Visit: 1-23-23  Future Office visit:    Next 5 appointments (look out 90 days)    May 18, 2023  8:15 AM  (Arrive by 8:00 AM)  [facility]  [facility] [address]  [facility] [address]  [phone]           Routing refill request to provider for review/approval because:  Drug not on the [facility], [facility] or [facility] refill protocol or controlled substance

## 2023-04-05 ENCOUNTER — MYC MEDICAL ADVICE (OUTPATIENT)
Dept: FAMILY MEDICINE | Facility: OTHER | Age: 38
End: 2023-04-05

## 2023-04-05 DIAGNOSIS — Z13.1 SCREENING FOR DIABETES MELLITUS: ICD-10-CM

## 2023-04-05 DIAGNOSIS — Z13.220 LIPID SCREENING: Primary | ICD-10-CM

## 2023-04-05 DIAGNOSIS — F41.9 ANXIETY: ICD-10-CM

## 2023-04-05 NOTE — TELEPHONE ENCOUNTER
She would like to her fasting labs prior to her physical on 5/18. I will call patient once labs are signed.

## 2023-04-14 ENCOUNTER — ALLIED HEALTH/NURSE VISIT (OUTPATIENT)
Dept: ALLERGY | Facility: OTHER | Age: 38
End: 2023-04-14
Payer: COMMERCIAL

## 2023-04-14 DIAGNOSIS — Z30.42 ENCOUNTER FOR SURVEILLANCE OF INJECTABLE CONTRACEPTIVE: Primary | ICD-10-CM

## 2023-04-14 PROCEDURE — 250N000011 HC RX IP 250 OP 636: Performed by: FAMILY MEDICINE

## 2023-04-14 PROCEDURE — 96372 THER/PROPH/DIAG INJ SC/IM: CPT | Performed by: FAMILY MEDICINE

## 2023-04-14 RX ADMIN — MEDROXYPROGESTERONE ACETATE 150 MG: 150 INJECTION, SUSPENSION INTRAMUSCULAR at 08:04

## 2023-04-14 NOTE — PROGRESS NOTES
Clinic Administered Medication Documentation      Depo Provera Documentation    Depo-Provera Standing Order inclusion/exclusion criteria reviewed.     Is this the initial or subsequent dose of Depo Provera? Subsequent dose - patient is within the acceptable window of time (11-15 weeks) for subsequent injection. Pregnancy test not indicated.    Patient meets: inclusion criteria     Is there an active order (written within the past 365 days, with administrations remaining, not ) in the chart? Yes.     Prior to injection, verified patient identity using patient's name and date of birth. Medication was administered. Please see MAR and medication order for additional information.     Vial/Syringe: Single dose vial. Was entire vial of medication used? Yes    Patient instructed to report any adverse reactions immediately.  NEXT INJECTION DUE: 23 - 23    Verified that the patient has refills remaining in their prescription.    Dillan Bower RN on 2023 at 8:05 AM

## 2023-05-02 DIAGNOSIS — G43.009 MIGRAINE WITHOUT AURA AND WITHOUT STATUS MIGRAINOSUS, NOT INTRACTABLE: ICD-10-CM

## 2023-05-02 NOTE — TELEPHONE ENCOUNTER
SUMAtriptan (IMITREX) 100 MG tablet  Last Written Prescription Date:  3-27-23  Last Fill Quantity: 9,   # refills: 0  Last Office Visit: 1-23-23  Future Office visit:    Next 5 appointments (look out 90 days)    May 18, 2023  7:45 AM  Lab visit with [facility]  [facility] ([facility] ) [address] [place] [address]  [phone]   May 18, 2023  8:15 AM  (Arrive by 8:00 AM)  PHYSICAL with Kend[facility]  [facility] ([facility] ) [address]  [place] [address]  [phone]           Routing refill request to provider for review/approval because:  Drug not on the [facility], [facility] or [facility] refill protocol or controlled substance

## 2023-05-03 RX ORDER — SUMATRIPTAN 100 MG/1
TABLET, FILM COATED ORAL
Qty: 9 TABLET | Refills: 0 | Status: SHIPPED | OUTPATIENT
Start: 2023-05-03 | End: 2023-08-14

## 2023-05-08 DIAGNOSIS — K21.9 GASTROESOPHAGEAL REFLUX DISEASE, UNSPECIFIED WHETHER ESOPHAGITIS PRESENT: ICD-10-CM

## 2023-05-08 RX ORDER — PANTOPRAZOLE SODIUM 40 MG/1
TABLET, DELAYED RELEASE ORAL
Qty: 90 TABLET | Refills: 0 | Status: SHIPPED | OUTPATIENT
Start: 2023-05-08 | End: 2023-08-09

## 2023-05-24 ENCOUNTER — MYC MEDICAL ADVICE (OUTPATIENT)
Dept: FAMILY MEDICINE | Facility: OTHER | Age: 38
End: 2023-05-24

## 2023-05-25 ENCOUNTER — TELEPHONE (OUTPATIENT)
Dept: FAMILY MEDICINE | Facility: OTHER | Age: 38
End: 2023-05-25

## 2023-05-26 ENCOUNTER — OFFICE VISIT (OUTPATIENT)
Dept: FAMILY MEDICINE | Facility: OTHER | Age: 38
End: 2023-05-26
Attending: FAMILY MEDICINE
Payer: COMMERCIAL

## 2023-05-26 VITALS
WEIGHT: 189.1 LBS | OXYGEN SATURATION: 95 % | DIASTOLIC BLOOD PRESSURE: 96 MMHG | SYSTOLIC BLOOD PRESSURE: 140 MMHG | HEART RATE: 96 BPM | TEMPERATURE: 97.7 F | BODY MASS INDEX: 32.46 KG/M2

## 2023-05-26 DIAGNOSIS — K13.0 ANGULAR CHEILITIS: Primary | ICD-10-CM

## 2023-05-26 DIAGNOSIS — K05.10 GINGIVITIS: ICD-10-CM

## 2023-05-26 DIAGNOSIS — I10 ESSENTIAL HYPERTENSION: ICD-10-CM

## 2023-05-26 PROCEDURE — 99214 OFFICE O/P EST MOD 30 MIN: CPT | Performed by: FAMILY MEDICINE

## 2023-05-26 PROCEDURE — G0463 HOSPITAL OUTPT CLINIC VISIT: HCPCS

## 2023-05-26 RX ORDER — CHLORHEXIDINE GLUCONATE ORAL RINSE 1.2 MG/ML
15 SOLUTION DENTAL 2 TIMES DAILY
Qty: 118 ML | Refills: 3 | Status: SHIPPED | OUTPATIENT
Start: 2023-05-26

## 2023-05-26 RX ORDER — AMLODIPINE BESYLATE 5 MG/1
5 TABLET ORAL DAILY
Qty: 90 TABLET | Refills: 0 | Status: SHIPPED | OUTPATIENT
Start: 2023-05-26 | End: 2023-06-30

## 2023-05-26 RX ORDER — MICONAZOLE NITRATE 20 MG/G
CREAM TOPICAL 2 TIMES DAILY
Qty: 15 G | Refills: 1 | Status: SHIPPED | OUTPATIENT
Start: 2023-05-26

## 2023-05-26 ASSESSMENT — PAIN SCALES - GENERAL: PAINLEVEL: SEVERE PAIN (7)

## 2023-05-26 NOTE — TELEPHONE ENCOUNTER
12:56 PM    Reason for Call: OVERBOOK    Patient is having the following symptoms: Cracked corners of lips for a couple months. Per [employer] message    The patient is requesting an appointment for ASAP with Dr Yanez.    Was an appointment offered for this call? Yes - next available not until end of June. Patient hoping to be seen sooner     Preferred method for responding to this message: Telephone Call  What is your phone number ?[phone]    If we cannot reach you directly, may we leave a detailed response at the number you provided? Yes    Can this message wait until your PCP/provider returns, if unavailable today? Not applicable    Romy Morrison    
Can use 3pm today. Check in 2:45.  
Left message for patient to call back.   
Lvm for pt to call me back-advised on message that provider could see pt today 5/26 checking in at 245  
Patient called back, will be seeing her today at 3pm  
[employer] dose 1 and 2

## 2023-05-26 NOTE — PATIENT INSTRUCTIONS
Good oral hygiene.  Avoid cinnamon products.  Topical antifungal twice daily for next 1-2 weeks.  Then only as needed.  Barrier cream, such as Desitin at night.  Peridex oral rinse twice daily for gums/gingivitis.    Start Norvasc/amlodipine 5 mg daily for blood pressure.

## 2023-05-26 NOTE — PROGRESS NOTES
Assessment & Plan     Angular cheilitis  Possible fungal overgrowth.  See patient instructions  - miconazole (MICATIN) 2 % external cream; Apply topically 2 times daily To corners of mouth    Gingivitis  Poor oral hygiene.  Lack of dental care.  Encouraged her to schedule.  Did offer  for resources- patient declined.  - chlorhexidine (PERIDEX) 0.12 % solution; Swish and spit 15 mLs in mouth 2 times daily    Essential hypertension  Start Norvasc 5 mg daily -counseled on use.  Encouraged dietary, exercise, lifestyle modifications.  Close follow up.  - amLODIPine (NORVASC) 5 MG tablet; Take 1 tablet (5 mg) by mouth daily       Patient Instructions   Good oral hygiene.  Avoid cinnamon products.  Topical antifungal twice daily for next 1-2 weeks.  Then only as needed.  Barrier cream, such as Desitin at night.  Peridex oral rinse twice daily for gums/gingivitis.    Start Norvasc/amlodipine 5 mg daily for blood pressure.      Return today (on 5/26/2023) for BP Recheck.    Kendra Bailey MD  [facility] - MONIQUE Mak is a 37 year old, presenting for the following health issues:  Mouth/Lip Problem        5/26/2023     2:48 PM   Additional Questions   Roomed by Nhan Doty   Accompanied by None         5/26/2023     2:48 PM   Patient Reported Additional Medications   Patient reports taking the following new medications None     HPI     Concern - Lip pain   Onset: 3/2023  Description: Corners of lips are cracked. Bleeding and pain   Intensity: moderate, severe  Progression of Symptoms:  worsening  Accompanying Signs & Symptoms: Bleeding and dry skin flaking off   Previous history of similar problem: None  Precipitating factors:        Worsened by: Talking, opening mouth and spicy foods   Alleviating factors:        Improved by: None   Therapies tried and outcome: Chap stick, oragel, Vaseline, antibiotic ointment    No other skin concerns.  No oral - mucosal  concerns.  Dental care - remote - many years.  Cost and dislike.  Sensitive gums.    Elevated blood pressure -   Elevated in clinic today.  Is on Effexor XR.  Prior chronic decongestant use.  Off that now.  Family history positive.  Has been positive on prior occasions as well.    Review of Systems   Constitutional, HEENT, cardiovascular, pulmonary, gi and gu systems are negative, except as otherwise noted.      Objective    BP (!) 153/101 (BP Location: Right arm, Patient Position: Sitting, Cuff Size: Adult Regular)   Pulse 96   Temp 97.7  F (36.5  C) (Tympanic)   Wt 85.8 kg (189 lb 1.6 oz)   SpO2 95%   BMI 32.46 kg/m    Body mass index is 32.46 kg/m .  Physical Exam   GENERAL: alert, no distress and over weight  HENT: ear canals and TM's normal, nose and mouth without ulcers or lesions; corners of mouth, cracked, erythematous; no other mucosal lesions; gums are inflamed diffusely  NECK: no adenopathy, no asymmetry, masses, or scars and thyroid normal to palpation  RESP: lungs clear to auscultation - no rales, rhonchi or wheezes  CV: regular rate and rhythm, normal S1 S2, no S3 or S4, no murmur, click or rub, no peripheral edema and peripheral pulses strong  MS: no gross musculoskeletal defects noted, no edema  SKIN: no suspicious lesions or rashes  PSYCH: mentation appears normal, affect normal/bright

## 2023-06-28 DIAGNOSIS — F41.9 ANXIETY: ICD-10-CM

## 2023-06-28 DIAGNOSIS — G43.009 MIGRAINE WITHOUT AURA AND WITHOUT STATUS MIGRAINOSUS, NOT INTRACTABLE: ICD-10-CM

## 2023-06-28 DIAGNOSIS — F32.A DEPRESSION, UNSPECIFIED DEPRESSION TYPE: ICD-10-CM

## 2023-06-28 RX ORDER — VENLAFAXINE HYDROCHLORIDE 150 MG/1
CAPSULE, EXTENDED RELEASE ORAL
Qty: 90 CAPSULE | Refills: 0 | Status: SHIPPED | OUTPATIENT
Start: 2023-06-28 | End: 2023-09-22

## 2023-06-28 NOTE — PROGRESS NOTES
Answers for HPI/ROS submitted by the patient on 6/30/2023  If you checked off any problems, how difficult have these problems made it for you to do your work, take care of things at home, or get along with other people?: Somewhat difficult  PHQ9 TOTAL SCORE: 1  JESSICA 7 TOTAL SCORE: 0      Assessment & Plan     Essential hypertension  Improved with Norvasc.    No side effects.  Compliant.  Continue 5 mg.  Continue to work on diet, exercise, weight management.  - amLODIPine (NORVASC) 5 MG tablet; Take 1 tablet (5 mg) by mouth daily    Migraine without aura and without status migrainosus, not intractable  Much improved.  Prn Imitrex.    Tobacco use disorder  Cessation advised.    Encounter for tobacco use cessation counseling  Counseling performed.    Anxiety  Stable with Effexor XR.       See Patient Instructions    Return for scheduled physical.    Kendra Bailey MD  [facility] - MONIQUE Mak is a 37 year old, presenting for the following health issues:  Hypertension        6/30/2023    9:30 AM   Additional Questions   Roomed by Jane Osullivan   Accompanied by Self     HPI     Vaccines - declined  Physical - scheduled - 10/2023.    Hypertension Follow-up - Norvasc started 5/26/23 5 mg    Do you check your blood pressure regularly outside of the clinic? No     Are you following a low salt diet? No    Are your blood pressures ever more than 140 on the top number (systolic) OR more   than 90 on the bottom number (diastolic), for example 140/90? No   No side effects.  Taking consistently.  No home cuff.  Fasting today for labs.  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  Feels better. More energy.  Has puppy now.  Getting some activity with it.    Depression and Anxiety Follow-Up    How are you doing with your depression since your last visit? Improved   How are you doing with your anxiety since your last visit?  No change    Are you having other symptoms that might be associated with depression or  anxiety? No    Have you had a significant life event? No     Do you have any concerns with your use of alcohol or other drugs? No    Social History     Tobacco Use     Smoking status: Every Day     Packs/day: 0.50     Years: 18.00     Pack years: 9.00     Types: Cigarettes     Smokeless tobacco: Never     Tobacco comments:     pt denies quit plan 6/17/2022   Vaping Use     Vaping Use: Never used   Substance Use Topics     Alcohol use: Not Currently     Comment: Maybe once a year if that     Drug use: Not Currently     Types: Marijuana         4/22/2022     8:00 AM 12/21/2022     9:24 AM 6/30/2023     9:29 AM   PHQ   PHQ-9 Total Score 8 0 1   Q9: Thoughts of better off dead/self-harm past 2 weeks Not at all Not at all Not at all         4/22/2022     8:00 AM 12/21/2022     9:24 AM 6/30/2023     9:29 AM   JESSICA-7 SCORE   Total Score   0 (minimal anxiety)   Total Score 4 0 0         6/30/2023     9:29 AM   Last PHQ-9   1.  Little interest or pleasure in doing things 0   2.  Feeling down, depressed, or hopeless 0   3.  Trouble falling or staying asleep, or sleeping too much 0   4.  Feeling tired or having little energy 1   5.  Poor appetite or overeating 0   6.  Feeling bad about yourself 0   7.  Trouble concentrating 0   8.  Moving slowly or restless 0   Q9: Thoughts of better off dead/self-harm past 2 weeks 0   PHQ-9 Total Score 1         6/30/2023     9:29 AM   JESSICA-7    1. Feeling nervous, anxious, or on edge 0   2. Not being able to stop or control worrying 0   3. Worrying too much about different things 0   4. Trouble relaxing 0   5. Being so restless that it is hard to sit still 0   6. Becoming easily annoyed or irritable 0   7. Feeling afraid, as if something awful might happen 0   JESSICA-7 Total Score 0       Suicide Assessment Five-step Evaluation and Treatment (SAFE-T)    Migraine     Since your last clinic visit, how have your headaches changed?  Improved    How often are you getting headaches or migraines? Less  than 1-2 times weekly      Are you able to do normal daily activities when you have a migraine? Yes    Are you taking rescue/relief medications? (Select all that apply) sumatriptan (Imitrex)    How helpful is your rescue/relief medication?  I get some relief    Are you taking any medications to prevent migraines? (Select all that apply)  No    In the past 4 weeks, how often have you gone to urgent care or the emergency room because of your headaches?  0     Much better since changing to dayshift        Review of Systems   Constitutional, HEENT, cardiovascular, pulmonary, gi and gu systems are negative, except as otherwise noted.      Objective    /80 (BP Location: Right arm, Patient Position: Chair, Cuff Size: Adult Regular)   Pulse 102   Temp 98.2  F (36.8  C) (Tympanic)   Resp 16   Wt 84.4 kg (186 lb)   SpO2 98%   BMI 31.93 kg/m    Body mass index is 31.93 kg/m .  Physical Exam   GENERAL: alert, no distress and over weight  EYES: Eyes grossly normal to inspection, PERRL and conjunctivae and sclerae normal  NECK: no adenopathy, no asymmetry, masses, or scars and thyroid normal to palpation  RESP: lungs clear to auscultation - no rales, rhonchi or wheezes  CV: regular rate and rhythm, normal S1 S2, no S3 or S4, no murmur, click or rub, no peripheral edema and peripheral pulses strong  ABDOMEN: soft, nontender, no hepatosplenomegaly, no masses and bowel sounds normal  MS: no gross musculoskeletal defects noted, no edema  PSYCH: mentation appears normal, affect normal/bright    Labs pending - cbc, cmp, lipids, tsh, vit d.

## 2023-06-28 NOTE — TELEPHONE ENCOUNTER
VENLAFAXINE HCL  MG CAP      Last Written Prescription Date:  12/21/2022  Last Fill Quantity: 90,   # refills: 1  Last Office Visit: 5/26/2023  Future Office visit:    Next 5 appointments (look out 90 days)    Jun 30, 2023  9:45 AM  (Arrive by 9:30 AM)  SHORT with Kendra Yanez MD  [facility] ([facility] ) [address]  [place] [address]  [phone]           Routing refill request to provider for review/approval because:   Serotonin-Norepinephrine Reuptake Inhibitors  Failed 06/28/2023 08:01 AM   Protocol Details  Blood pressure under 140/90 in past 12 months    PHQ-9 score of less than 5 in past 6 months        Kimberly Boecker, RN

## 2023-06-30 ENCOUNTER — OFFICE VISIT (OUTPATIENT)
Dept: FAMILY MEDICINE | Facility: OTHER | Age: 38
End: 2023-06-30
Attending: FAMILY MEDICINE
Payer: COMMERCIAL

## 2023-06-30 VITALS
TEMPERATURE: 98.2 F | DIASTOLIC BLOOD PRESSURE: 80 MMHG | WEIGHT: 186 LBS | OXYGEN SATURATION: 98 % | RESPIRATION RATE: 16 BRPM | HEART RATE: 102 BPM | SYSTOLIC BLOOD PRESSURE: 124 MMHG | BODY MASS INDEX: 31.93 KG/M2

## 2023-06-30 DIAGNOSIS — F41.9 ANXIETY: ICD-10-CM

## 2023-06-30 DIAGNOSIS — R73.09 ELEVATED GLUCOSE: ICD-10-CM

## 2023-06-30 DIAGNOSIS — Z71.6 ENCOUNTER FOR TOBACCO USE CESSATION COUNSELING: ICD-10-CM

## 2023-06-30 DIAGNOSIS — I10 ESSENTIAL HYPERTENSION: Primary | ICD-10-CM

## 2023-06-30 DIAGNOSIS — Z13.220 LIPID SCREENING: ICD-10-CM

## 2023-06-30 DIAGNOSIS — F17.200 TOBACCO USE DISORDER: ICD-10-CM

## 2023-06-30 DIAGNOSIS — Z13.1 SCREENING FOR DIABETES MELLITUS: ICD-10-CM

## 2023-06-30 DIAGNOSIS — G43.009 MIGRAINE WITHOUT AURA AND WITHOUT STATUS MIGRAINOSUS, NOT INTRACTABLE: ICD-10-CM

## 2023-06-30 LAB
ALBUMIN SERPL BCG-MCNC: 4.2 G/DL (ref 3.5–5.2)
ALP SERPL-CCNC: 72 U/L (ref 35–104)
ALT SERPL W P-5'-P-CCNC: 17 U/L (ref 0–50)
ANION GAP SERPL CALCULATED.3IONS-SCNC: 12 MMOL/L (ref 7–15)
AST SERPL W P-5'-P-CCNC: 19 U/L (ref 0–45)
BASOPHILS # BLD AUTO: 0.1 10E3/UL (ref 0–0.2)
BASOPHILS NFR BLD AUTO: 1 %
BILIRUB SERPL-MCNC: 0.3 MG/DL
BUN SERPL-MCNC: 13.4 MG/DL (ref 6–20)
CALCIUM SERPL-MCNC: 9.1 MG/DL (ref 8.6–10)
CHLORIDE SERPL-SCNC: 106 MMOL/L (ref 98–107)
CHOLEST SERPL-MCNC: 192 MG/DL
CREAT SERPL-MCNC: 0.64 MG/DL (ref 0.51–0.95)
DEPRECATED HCO3 PLAS-SCNC: 23 MMOL/L (ref 22–29)
EOSINOPHIL # BLD AUTO: 0.1 10E3/UL (ref 0–0.7)
EOSINOPHIL NFR BLD AUTO: 1 %
ERYTHROCYTE [DISTWIDTH] IN BLOOD BY AUTOMATED COUNT: 14.6 % (ref 10–15)
EST. AVERAGE GLUCOSE BLD GHB EST-MCNC: 111 MG/DL
GFR SERPL CREATININE-BSD FRML MDRD: >90 ML/MIN/1.73M2
GLUCOSE SERPL-MCNC: 111 MG/DL (ref 70–99)
HBA1C MFR BLD: 5.5 %
HCT VFR BLD AUTO: 41.9 % (ref 35–47)
HDLC SERPL-MCNC: 46 MG/DL
HGB BLD-MCNC: 14.1 G/DL (ref 11.7–15.7)
IMM GRANULOCYTES # BLD: 0 10E3/UL
IMM GRANULOCYTES NFR BLD: 0 %
LDLC SERPL CALC-MCNC: 125 MG/DL
LYMPHOCYTES # BLD AUTO: 3.5 10E3/UL (ref 0.8–5.3)
LYMPHOCYTES NFR BLD AUTO: 34 %
MCH RBC QN AUTO: 31.4 PG (ref 26.5–33)
MCHC RBC AUTO-ENTMCNC: 33.7 G/DL (ref 31.5–36.5)
MCV RBC AUTO: 93 FL (ref 78–100)
MONOCYTES # BLD AUTO: 0.5 10E3/UL (ref 0–1.3)
MONOCYTES NFR BLD AUTO: 5 %
NEUTROPHILS # BLD AUTO: 6.1 10E3/UL (ref 1.6–8.3)
NEUTROPHILS NFR BLD AUTO: 59 %
NONHDLC SERPL-MCNC: 146 MG/DL
NRBC # BLD AUTO: 0 10E3/UL
NRBC BLD AUTO-RTO: 0 /100
PLATELET # BLD AUTO: 362 10E3/UL (ref 150–450)
POTASSIUM SERPL-SCNC: 3.8 MMOL/L (ref 3.4–5.3)
PROT SERPL-MCNC: 6.7 G/DL (ref 6.4–8.3)
RBC # BLD AUTO: 4.49 10E6/UL (ref 3.8–5.2)
SODIUM SERPL-SCNC: 141 MMOL/L (ref 136–145)
TRIGL SERPL-MCNC: 106 MG/DL
TSH SERPL DL<=0.005 MIU/L-ACNC: 1.38 UIU/ML (ref 0.3–4.2)
WBC # BLD AUTO: 10.2 10E3/UL (ref 4–11)

## 2023-06-30 PROCEDURE — 36415 COLL VENOUS BLD VENIPUNCTURE: CPT | Mod: ZL | Performed by: FAMILY MEDICINE

## 2023-06-30 PROCEDURE — 82306 VITAMIN D 25 HYDROXY: CPT | Mod: ZL | Performed by: FAMILY MEDICINE

## 2023-06-30 PROCEDURE — 250N000011 HC RX IP 250 OP 636: Mod: JZ | Performed by: FAMILY MEDICINE

## 2023-06-30 PROCEDURE — 99213 OFFICE O/P EST LOW 20 MIN: CPT | Performed by: FAMILY MEDICINE

## 2023-06-30 PROCEDURE — 80053 COMPREHEN METABOLIC PANEL: CPT | Mod: ZL | Performed by: FAMILY MEDICINE

## 2023-06-30 PROCEDURE — G0463 HOSPITAL OUTPT CLINIC VISIT: HCPCS | Mod: 25

## 2023-06-30 PROCEDURE — 84443 ASSAY THYROID STIM HORMONE: CPT | Mod: ZL | Performed by: FAMILY MEDICINE

## 2023-06-30 PROCEDURE — G0463 HOSPITAL OUTPT CLINIC VISIT: HCPCS

## 2023-06-30 PROCEDURE — 96372 THER/PROPH/DIAG INJ SC/IM: CPT | Performed by: FAMILY MEDICINE

## 2023-06-30 PROCEDURE — 85004 AUTOMATED DIFF WBC COUNT: CPT | Mod: ZL | Performed by: FAMILY MEDICINE

## 2023-06-30 PROCEDURE — 83036 HEMOGLOBIN GLYCOSYLATED A1C: CPT | Mod: ZL | Performed by: FAMILY MEDICINE

## 2023-06-30 PROCEDURE — 80061 LIPID PANEL: CPT | Mod: ZL | Performed by: FAMILY MEDICINE

## 2023-06-30 RX ORDER — AMLODIPINE BESYLATE 5 MG/1
5 TABLET ORAL DAILY
Qty: 90 TABLET | Refills: 1 | Status: SHIPPED | OUTPATIENT
Start: 2023-06-30 | End: 2024-02-13

## 2023-06-30 RX ADMIN — MEDROXYPROGESTERONE ACETATE 150 MG: 150 INJECTION, SUSPENSION INTRAMUSCULAR at 09:45

## 2023-06-30 ASSESSMENT — ANXIETY QUESTIONNAIRES
7. FEELING AFRAID AS IF SOMETHING AWFUL MIGHT HAPPEN: NOT AT ALL
GAD7 TOTAL SCORE: 0
GAD7 TOTAL SCORE: 0
7. FEELING AFRAID AS IF SOMETHING AWFUL MIGHT HAPPEN: NOT AT ALL
5. BEING SO RESTLESS THAT IT IS HARD TO SIT STILL: NOT AT ALL
6. BECOMING EASILY ANNOYED OR IRRITABLE: NOT AT ALL
1. FEELING NERVOUS, ANXIOUS, OR ON EDGE: NOT AT ALL
GAD7 TOTAL SCORE: 0
4. TROUBLE RELAXING: NOT AT ALL
2. NOT BEING ABLE TO STOP OR CONTROL WORRYING: NOT AT ALL
3. WORRYING TOO MUCH ABOUT DIFFERENT THINGS: NOT AT ALL

## 2023-06-30 ASSESSMENT — PATIENT HEALTH QUESTIONNAIRE - PHQ9
SUM OF ALL RESPONSES TO PHQ QUESTIONS 1-9: 1
SUM OF ALL RESPONSES TO PHQ QUESTIONS 1-9: 1
10. IF YOU CHECKED OFF ANY PROBLEMS, HOW DIFFICULT HAVE THESE PROBLEMS MADE IT FOR YOU TO DO YOUR WORK, TAKE CARE OF THINGS AT HOME, OR GET ALONG WITH OTHER PEOPLE: SOMEWHAT DIFFICULT

## 2023-06-30 ASSESSMENT — PAIN SCALES - GENERAL: PAINLEVEL: NO PAIN (0)

## 2023-06-30 NOTE — NURSING NOTE
Clinic Administered Medication Documentation        Patient was given Depo Provera. Prior to medication administration, verified patient's identity using patient s name and date of birth. Please see MAR and medication order for additional information. Patient instructed to remain in clinic for 15 minutes and report any adverse reaction to staff immediately.    Vial/Syringe: Syringe    NEXT INJECTION DUE: 9/15/23 - 09/29/2023

## 2023-06-30 NOTE — LETTER
July 13, 2023      Aubree Corcoran  [address] [place] [address]        Dear ,    We are writing to inform you of your test results.    Vitamin D is very low. Start vitamin d 50,000u once per week for the next 6 weeks. Prescription sent. Then continue with vitamin d 2000u every day  Cholesterol panel shows your LDL (bad cholesterol) is on the high side and HDL (good cholesterol) is on the low side. Encourage healthy diet.   Your comprehensive panel is normal, which means normal electrolytes, along with normal kidney and liver function.  A1c 5.5 (not diabetic). Blood sugar 111  Thyroid is running well  Complete blood count normal      Resulted Orders   Lipid Profile (Chol, Trig, HDL, LDL calc)   Result Value Ref Range    Cholesterol 192 <200 mg/dL    Triglycerides 106 <150 mg/dL    Direct Measure HDL 46 (L) >=50 mg/dL    LDL Cholesterol Calculated 125 (H) <=100 mg/dL    Non HDL Cholesterol 146 (H) <130 mg/dL    Narrative    Cholesterol  Desirable:  <200 mg/dL    Triglycerides  Normal:  Less than 150 mg/dL  Borderline High:  150-199 mg/dL  High:  200-499 mg/dL  Very High:  Greater than or equal to 500 mg/dL    Direct Measure HDL  Female:  Greater than or equal to 50 mg/dL   Male:  Greater than or equal to 40 mg/dL    LDL Cholesterol  Desirable:  <100mg/dL  Above Desirable:  100-129 mg/dL   Borderline High:  130-159 mg/dL   High:  160-189 mg/dL   Very High:  >= 190 mg/dL    Non HDL Cholesterol  Desirable:  130 mg/dL  Above Desirable:  130-159 mg/dL  Borderline High:  160-189 mg/dL  High:  190-219 mg/dL  Very High:  Greater than or equal to 220 mg/dL   Comprehensive metabolic panel (BMP + Alb, Alk Phos, ALT, AST, Total. Bili, TP)   Result Value Ref Range    Sodium 141 136 - 145 mmol/L    Potassium 3.8 3.4 - 5.3 mmol/L    Chloride 106 98 - 107 mmol/L    Carbon Dioxide (CO2) 23 22 - 29 mmol/L    Anion Gap 12 7 - 15 mmol/L    Urea Nitrogen 13.4 6.0 - 20.0 mg/dL    Creatinine 0.64 0.51 - 0.95 mg/dL    Calcium  9.1 8.6 - 10.0 mg/dL    Glucose 111 (H) 70 - 99 mg/dL    Alkaline Phosphatase 72 35 - 104 U/L    AST 19 0 - 45 U/L      Comment:      Reference intervals for this test were updated on 6/12/2023 to more accurately reflect our healthy population. There may be differences in the flagging of prior results with similar values performed with this method. Interpretation of those prior results can be made in the context of the updated reference intervals.    ALT 17 0 - 50 U/L      Comment:      Reference intervals for this test were updated on 6/12/2023 to more accurately reflect our healthy population. There may be differences in the flagging of prior results with similar values performed with this method. Interpretation of those prior results can be made in the context of the updated reference intervals.      Protein Total 6.7 6.4 - 8.3 g/dL    Albumin 4.2 3.5 - 5.2 g/dL    Bilirubin Total 0.3 <=1.2 mg/dL    GFR Estimate >90 >60 mL/min/1.73m2   CBC with platelets and differential   Result Value Ref Range    WBC Count 10.2 4.0 - 11.0 10e3/uL    RBC Count 4.49 3.80 - 5.20 10e6/uL    Hemoglobin 14.1 11.7 - 15.7 g/dL    Hematocrit 41.9 35.0 - 47.0 %    MCV 93 78 - 100 fL    MCH 31.4 26.5 - 33.0 pg    MCHC 33.7 31.5 - 36.5 g/dL    RDW 14.6 10.0 - 15.0 %    Platelet Count 362 150 - 450 10e3/uL    % Neutrophils 59 %    % Lymphocytes 34 %    % Monocytes 5 %    % Eosinophils 1 %    % Basophils 1 %    % Immature Granulocytes 0 %    NRBCs per 100 WBC 0 <1 /100    Absolute Neutrophils 6.1 1.6 - 8.3 10e3/uL    Absolute Lymphocytes 3.5 0.8 - 5.3 10e3/uL    Absolute Monocytes 0.5 0.0 - 1.3 10e3/uL    Absolute Eosinophils 0.1 0.0 - 0.7 10e3/uL    Absolute Basophils 0.1 0.0 - 0.2 10e3/uL    Absolute Immature Granulocytes 0.0 <=0.4 10e3/uL    Absolute NRBCs 0.0 10e3/uL   Hemoglobin A1c   Result Value Ref Range    Estimated Average Glucose 111 mg/dL    Hemoglobin A1C 5.5 <5.7 %      Comment:      Normal <5.7%   Prediabetes 5.7-6.4%     Diabetes 6.5% or higher     Note: Adopted from ADA consensus guidelines.       If you have any questions or concerns, please call the clinic at the number listed above.       Sincerely,      Kendra Yanez MD

## 2023-07-03 DIAGNOSIS — E55.9 VITAMIN D DEFICIENCY: Primary | ICD-10-CM

## 2023-07-03 LAB — DEPRECATED CALCIDIOL+CALCIFEROL SERPL-MC: 10 UG/L (ref 20–75)

## 2023-07-03 RX ORDER — ERGOCALCIFEROL 1.25 MG/1
50000 CAPSULE, LIQUID FILLED ORAL WEEKLY
Qty: 6 CAPSULE | Refills: 0 | Status: SHIPPED | OUTPATIENT
Start: 2023-07-03 | End: 2023-08-08

## 2023-07-03 RX ORDER — CHOLECALCIFEROL (VITAMIN D3) 50 MCG
1 TABLET ORAL DAILY
Qty: 90 TABLET | Refills: 3 | Status: SHIPPED | OUTPATIENT
Start: 2023-08-07 | End: 2024-07-15

## 2023-07-22 ENCOUNTER — HEALTH MAINTENANCE LETTER (OUTPATIENT)
Age: 38
End: 2023-07-22

## 2023-08-14 DIAGNOSIS — G43.009 MIGRAINE WITHOUT AURA AND WITHOUT STATUS MIGRAINOSUS, NOT INTRACTABLE: ICD-10-CM

## 2023-08-14 RX ORDER — SUMATRIPTAN 100 MG/1
TABLET, FILM COATED ORAL
Qty: 9 TABLET | Refills: 1 | Status: SHIPPED | OUTPATIENT
Start: 2023-08-14 | End: 2023-10-25

## 2023-08-14 NOTE — TELEPHONE ENCOUNTER
Disp Refills Start End ABE   SUMAtriptan (IMITREX) 100 MG tablet 9 tablet 0 5/3/2023     Last Office Visit: 06/30/23  Future Office visit:    Next 5 appointments (look out 90 days)       Oct 25, 2023  8:15 AM  (Arrive by 8:00 AM)  PHYSICAL with Kendra Yanez MD  [facility] [place] ([facility] [place] ) [address]  [place] [address]  [phone]      Mohs Rapid Report Verbiage: The area of clinically evident tumor was marked with skin marking ink and appropriately hatched.  The initial incision was made following the Mohs approach through the skin.  The specimen was taken to the lab, divided into the necessary number of pieces, chromacoded and processed according to the Mohs protocol.  This was repeated in successive stages until a tumor free defect was achieved.

## 2023-09-18 ENCOUNTER — ALLIED HEALTH/NURSE VISIT (OUTPATIENT)
Dept: ALLERGY | Facility: OTHER | Age: 38
End: 2023-09-18
Attending: FAMILY MEDICINE
Payer: COMMERCIAL

## 2023-09-18 DIAGNOSIS — Z30.42 ENCOUNTER FOR SURVEILLANCE OF INJECTABLE CONTRACEPTIVE: Primary | ICD-10-CM

## 2023-09-18 PROCEDURE — 250N000011 HC RX IP 250 OP 636: Mod: JZ | Performed by: FAMILY MEDICINE

## 2023-09-18 PROCEDURE — 96372 THER/PROPH/DIAG INJ SC/IM: CPT | Performed by: FAMILY MEDICINE

## 2023-09-18 RX ADMIN — MEDROXYPROGESTERONE ACETATE 150 MG: 150 INJECTION, SUSPENSION INTRAMUSCULAR at 16:29

## 2023-09-18 NOTE — PROGRESS NOTES
Clinic Administered Medication Documentation      Depo Provera Documentation    Depo-Provera Standing Order inclusion/exclusion criteria reviewed.     Is this the initial or subsequent dose of Depo Provera? Subsequent dose - patient is within the acceptable window of time (11-15 weeks) for subsequent injection. Pregnancy test not indicated.    Patient meets: inclusion criteria     Is there an active order (written within the past 365 days, with administrations remaining, not ) in the chart? Yes.     Prior to injection, verified patient identity using patient's name and date of birth. Medication was administered. Please see MAR and medication order for additional information.     Vial/Syringe: Single dose vial. Was entire vial of medication used? Yes    Patient instructed to report any adverse reaction to staff immediately.  NEXT INJECTION DUE: 23 - 24    Verified that the patient has refills remaining in their prescription.  Left Gluteus Joe  AYAN ERAZO LPN

## 2023-09-21 DIAGNOSIS — F32.A DEPRESSION, UNSPECIFIED DEPRESSION TYPE: ICD-10-CM

## 2023-09-21 DIAGNOSIS — G43.009 MIGRAINE WITHOUT AURA AND WITHOUT STATUS MIGRAINOSUS, NOT INTRACTABLE: ICD-10-CM

## 2023-09-21 DIAGNOSIS — G47.00 INSOMNIA, UNSPECIFIED TYPE: ICD-10-CM

## 2023-09-21 DIAGNOSIS — F41.9 ANXIETY: ICD-10-CM

## 2023-09-22 RX ORDER — HYDROXYZINE PAMOATE 25 MG/1
CAPSULE ORAL
Qty: 120 CAPSULE | Refills: 3 | Status: SHIPPED | OUTPATIENT
Start: 2023-09-22 | End: 2024-02-26

## 2023-09-22 RX ORDER — VENLAFAXINE HYDROCHLORIDE 150 MG/1
CAPSULE, EXTENDED RELEASE ORAL
Qty: 90 CAPSULE | Refills: 0 | Status: SHIPPED | OUTPATIENT
Start: 2023-09-22 | End: 2023-12-26

## 2023-09-22 NOTE — TELEPHONE ENCOUNTER
Vistaril      Last Written Prescription Date:  3/27/23  Last Fill Quantity: 120,   # refills: 3  Last Office Visit: 6/30/23  Future Office visit:    Next 5 appointments (look out 90 days)      Oct 25, 2023  8:15 AM  (Arrive by 8:00 AM)  PHYSICAL with Kendra Yanez MD  [facility] [place] ([facility] [place] ) [address] at [address] [facility] [address] [place] [address]  [phone]             Routing refill request to provider for review/approval because:      Effexor      Last Written Prescription Date:  6/28/23  Last Fill Quantity: 90,   # refills: 0  Last Office Visit: 6/30/23  Future Office visit:    Next 5 appointments (look out 90 days)      Oct 25, 2023  8:15 AM  (Arrive by 8:00 AM)  PHYSICAL with Kendra Yanez MD  [facility] [place] ([facility] [place] ) [address] [place] [address]  [phone]             Routing refill request to provider for review/approval because:

## 2023-10-19 ASSESSMENT — ENCOUNTER SYMPTOMS
DIARRHEA: 0
FREQUENCY: 0
PARESTHESIAS: 0
HEMATURIA: 0
NAUSEA: 0
HEARTBURN: 0
EYE PAIN: 0
ARTHRALGIAS: 0
SHORTNESS OF BREATH: 0
ABDOMINAL PAIN: 0
MYALGIAS: 0
HEADACHES: 0
SORE THROAT: 0
COUGH: 0
FEVER: 0
JOINT SWELLING: 0
NERVOUS/ANXIOUS: 0
DIZZINESS: 0
DYSURIA: 0
WEAKNESS: 0
HEMATOCHEZIA: 0
CONSTIPATION: 0
BREAST MASS: 0
CHILLS: 0
PALPITATIONS: 0

## 2023-10-20 ASSESSMENT — ENCOUNTER SYMPTOMS
NAUSEA: 0
PARESTHESIAS: 0
SORE THROAT: 0
CHILLS: 0
ABDOMINAL PAIN: 0
BREAST MASS: 0
HEMATURIA: 0
DYSURIA: 0
MYALGIAS: 0
DIARRHEA: 0
CONSTIPATION: 0
COUGH: 0
WEAKNESS: 0
DIZZINESS: 0
NERVOUS/ANXIOUS: 0
HEARTBURN: 0
SHORTNESS OF BREATH: 0
FEVER: 0
FREQUENCY: 0
PALPITATIONS: 0
JOINT SWELLING: 0
ARTHRALGIAS: 0
HEADACHES: 0
HEMATOCHEZIA: 0
EYE PAIN: 0

## 2023-10-20 NOTE — PROGRESS NOTES
SUBJECTIVE:   CC: Aubree is an 38 year old who presents for preventive health visit.       Healthy Habits:     Getting at least 3 servings of Calcium per day:  NO    Bi-annual eye exam:  NO    Dental care twice a year:  NO    Sleep apnea or symptoms of sleep apnea:  Daytime drowsiness and Sleep apnea    Diet:  Regular (no restrictions)    Frequency of exercise:  None    Taking medications regularly:  Yes    Medication side effects:  None    Does not exercise. Does not like to be active.    Labs fasting 6/2023.    Vaccines - declines  Pap/hpv 2019 - due 9/2024    Vit d deficient - is taking a supplement Vit D3 2000 units     Tobacco - been considering cessation; doesn't smoke inside; in new apartment (dad's); cut back at work - at <1 ppd; prior chantix, nicotine patches.  No help.  Willing to try patch again.    GERD _ is taking protonix-it seems to help    Prediabetes -  Lab Results   Component Value Date    A1C 5.5 06/30/2023     Inquiring about sleep apnea.  After surgery - sister felt she had it.  BP fell?  Startles self away with snore in bath tub.  Sleeps alone.  Fatigued often.  Headaches.    Hypertension Follow-up  Do you check your blood pressure regularly outside of the clinic? No   Are you following a low salt diet? No  Are your blood pressures ever more than 140 on the top number (systolic) OR more   than 90 on the bottom number (diastolic), for example 140/90? No    Depression and Anxiety Follow-Up  How are you doing with your depression since your last visit? Improved   How are you doing with your anxiety since your last visit?  Improved  with medication   Are you having other symptoms that might be associated with depression or anxiety? No  Have you had a significant life event? No   Do you have any concerns with your use of alcohol or other drugs? No  New job  Moved into new apartment  Changed from night to day shift    Social History     Tobacco Use    Smoking status: Every Day     Packs/day:  0.50     Years: 18.00     Additional pack years: 0.00     Total pack years: 9.00     Types: Cigarettes    Smokeless tobacco: Never    Tobacco comments:     Considering quitting   Vaping Use    Vaping Use: Never used   Substance Use Topics    Alcohol use: Not Currently     Comment: Maybe once a year if that    Drug use: Never     Types: Marijuana         4/22/2022     8:00 AM 12/21/2022     9:24 AM 6/30/2023     9:29 AM   PHQ   PHQ-9 Total Score 8 0 1   Q9: Thoughts of better off dead/self-harm past 2 weeks Not at all Not at all Not at all         4/22/2022     8:00 AM 12/21/2022     9:24 AM 6/30/2023     9:29 AM   JESSICA-7 SCORE   Total Score   0 (minimal anxiety)   Total Score 4 0 0         6/30/2023     9:29 AM   Last PHQ-9   1.  Little interest or pleasure in doing things 0   2.  Feeling down, depressed, or hopeless 0   3.  Trouble falling or staying asleep, or sleeping too much 0   4.  Feeling tired or having little energy 1   5.  Poor appetite or overeating 0   6.  Feeling bad about yourself 0   7.  Trouble concentrating 0   8.  Moving slowly or restless 0   Q9: Thoughts of better off dead/self-harm past 2 weeks 0   PHQ-9 Total Score 1         6/30/2023     9:29 AM   JESSICA-7    1. Feeling nervous, anxious, or on edge 0   2. Not being able to stop or control worrying 0   3. Worrying too much about different things 0   4. Trouble relaxing 0   5. Being so restless that it is hard to sit still 0   6. Becoming easily annoyed or irritable 0   7. Feeling afraid, as if something awful might happen 0   JESSICA-7 Total Score 0       Suicide Assessment Five-step Evaluation and Treatment (SAFE-T)      Migraine   Since your last clinic visit, how have your headaches changed?  Improved  How often are you getting headaches or migraines? 1-2 a week; 1 week she had a migraines every a day   Are you able to do normal daily activities when you have a migraine? Yes  Are you taking rescue/relief medications? (Select all that apply)  sumatriptan (Imitrex)  How helpful is your rescue/relief medication?  I get total relief  Are you taking any medications to prevent migraines? (Select all that apply)  Other: Venlafaxine   In the past 4 weeks, how often have you gone to urgent care or the emergency room because of your headaches?  0  Have you ever done Advance Care Planning? (For example, a Health Directive, POLST, or a discussion with a medical provider or your loved ones about your wishes): No, advance care planning information given to patient to review.  Patient declined advance care planning discussion at this time.    Social History     Tobacco Use    Smoking status: Every Day     Packs/day: 0.50     Years: 18.00     Additional pack years: 0.00     Total pack years: 9.00     Types: Cigarettes    Smokeless tobacco: Never    Tobacco comments:     Considering quitting   Substance Use Topics    Alcohol use: Not Currently     Comment: Maybe once a year if that             10/19/2023     9:44 PM   Alcohol Use   Prescreen: >3 drinks/day or >7 drinks/week? Not Applicable     Reviewed orders with patient.  Reviewed health maintenance and updated orders accordingly - Yes  Current Outpatient Medications   Medication    amLODIPine (NORVASC) 5 MG tablet    chlorhexidine (PERIDEX) 0.12 % solution    hydrOXYzine (VISTARIL) 25 MG capsule    miconazole (MICATIN) 2 % external cream    pantoprazole (PROTONIX) 40 MG EC tablet    prochlorperazine (COMPAZINE) 5 MG tablet    SUMAtriptan (IMITREX) 100 MG tablet    venlafaxine (EFFEXOR XR) 150 MG 24 hr capsule    vitamin D3 (CHOLECALCIFEROL) 50 mcg (2000 units) tablet     No current facility-administered medications for this visit.       Lab work is in process  Labs reviewed in EPIC    Breast Cancer Screenin/11/2023    12:17 PM   Breast CA Risk Assessment (FHS-7)   Do you have a family history of breast, colon, or ovarian cancer? No / Unknown         Patient under 40 years of age: Routine Mammogram  Screening not recommended.   Pertinent mammograms are reviewed under the imaging tab.    History of abnormal Pap smear: NO - age 30-65 PAP every 5 years with negative HPV co-testing recommended      Latest Ref Rng & Units 9/4/2019     8:41 AM   PAP / HPV   PAP (Historical)  NIL    HPV 16 DNA NEG^Negative Negative    HPV 18 DNA NEG^Negative Negative    Other HR HPV NEG^Negative Negative      Reviewed and updated as needed this visit by clinical staff   Tobacco  Allergies  Meds              Reviewed and updated as needed this visit by Provider                 Past Medical History:   Diagnosis Date    Depressive disorder     Essential hypertension 6/30/2023    Family history of heart disease in female family member before age 65 04/07/2017    Family history of heart disease in male family member before age 55 04/07/2017    Gastroesophageal reflux disease, esophagitis presence not specified 04/07/2017    Irritable bowel syndrome with both constipation and diarrhea 04/07/2017    Migraine without aura and without status migrainosus, not intractable 04/07/2017    Dr Cade, neurology    Tobacco use disorder       Past Surgical History:   Procedure Laterality Date    ABDOMEN SURGERY  2/28/2021    Gallbladder removed    ENDOSCOPIC RETROGRADE CHOLANGIOPANCREATOGRAPHY  01/12/2023    Dr Campos; biliary sphincterotomy and balloon extraction CBD stone    LAPAROSCOPIC CHOLECYSTECTOMY N/A 02/27/2021    Procedure: LAPAROSCOPIC CHOLECYSTECTOMY,;  Surgeon: Delmar French DO;  Location: [facility] OR    ORTHOPEDIC SURGERY  2002    ankle surgery- bone spur removal     OB History   No obstetric history on file.       Review of Systems   Constitutional:  Negative for chills and fever.   HENT:  Negative for congestion, ear pain, hearing loss and sore throat.    Eyes:  Negative for pain and visual disturbance.   Respiratory:  Negative for cough and shortness of breath.    Cardiovascular:  Negative for chest pain, palpitations and  "peripheral edema.   Gastrointestinal:  Negative for abdominal pain, constipation, diarrhea, heartburn, hematochezia and nausea.   Breasts:  Negative for tenderness, breast mass and discharge.   Genitourinary:  Negative for dysuria, frequency, genital sores, hematuria, pelvic pain, urgency, vaginal bleeding and vaginal discharge.   Musculoskeletal:  Negative for arthralgias, joint swelling and myalgias.   Skin:  Negative for rash.   Neurological:  Negative for dizziness, weakness, headaches and paresthesias.   Psychiatric/Behavioral:  Negative for mood changes. The patient is not nervous/anxious.         OBJECTIVE:   /80 (BP Location: Left arm, Patient Position: Sitting, Cuff Size: Adult Regular)   Pulse 110   Temp 98.8  F (37.1  C) (Tympanic)   Ht 1.613 m (5' 3.5\")   Wt 83.5 kg (184 lb 1.6 oz)   SpO2 98%   BMI 32.10 kg/m    Physical Exam  GENERAL: healthy, alert and no distress  EYES: Eyes grossly normal to inspection, PERRL and conjunctivae and sclerae normal  HENT: ear canals and TM's normal, nose and mouth without ulcers or lesions  NECK: no adenopathy, no asymmetry, masses, or scars and thyroid normal to palpation  RESP: lungs clear to auscultation - no rales, rhonchi or wheezes  BREAST: normal without masses, tenderness or nipple discharge and no palpable axillary masses or adenopathy  CV: regular rate and rhythm, normal S1 S2, no S3 or S4, no murmur, click or rub, no peripheral edema and peripheral pulses strong  ABDOMEN: soft, nontender, no hepatosplenomegaly, no masses and bowel sounds normal  MS: no gross musculoskeletal defects noted, no edema  SKIN: no suspicious lesions or rashes  NEURO: Normal strength and tone, mentation intact and speech normal  PSYCH: mentation appears normal, affect normal/bright    Diagnostic Test Results:  Labs reviewed in Epic    ASSESSMENT/PLAN:       ICD-10-CM    1. Routine general medical examination at a health care facility  Z00.00       2. Migraine without aura " "and without status migrainosus, not intractable  G43.009       3. Anxiety  F41.9       4. Depression, unspecified depression type  F32.A       5. Insomnia, unspecified type  G47.00       6. Tobacco use disorder  F17.200       7. Encounter for tobacco use cessation counseling  Z71.6       8. Essential hypertension  I10       9. Elevated glucose  R73.09       10. Vitamin D deficiency  E55.9       11. Gastroesophageal reflux disease, unspecified whether esophagitis present  K21.9           Migraine - stable; continue Effexor XR daily; prn Imitrex  Anxiety/depression - improved; continue Effexor XR; encouraged exercise, sleep hygiene, stress reduction.  Tobacco counseling performed.  Started on Nicotine patches.  Counseled on use.  Hypertension - stable - continue Norvasc 5 mg.  Elevated glucose fasting, but A1c normal.  Lifestyle modifications.  Recheck annually.  Vit D deficient - completed high dose script; now OTC daily supplement.  GERD - stable with PPI.  Fatigue, snoring - concern for apnea - sleep referral placed.    Vaccines declines.  Pap due next year.        COUNSELING:  Reviewed preventive health counseling, as reflected in patient instructions       Regular exercise       Healthy diet/nutrition       Vision screening       Hearing screening       Alcohol Use       Osteoporosis prevention/bone health       Colorectal Cancer Screening      BMI:   Estimated body mass index is 32.1 kg/m  as calculated from the following:    Height as of this encounter: 1.613 m (5' 3.5\").    Weight as of this encounter: 83.5 kg (184 lb 1.6 oz).   Weight management plan: Discussed healthy diet and exercise guidelines      She reports that she has been smoking cigarettes. She has a 9.00 pack-year smoking history. She has never used smokeless tobacco.  Nicotine/Tobacco Cessation Plan:   Pharmacotherapies : Nicotine patch          Kendra Bailey MD  [facility] [place]  "

## 2023-10-20 NOTE — PATIENT INSTRUCTIONS
Sleep referral placed.  Nicotine patches sent.  Try to work on small amounts of exercise throughout the day (5-10 min walks, take the stairs, etc).      Preventive Health Recommendations  Female Ages 26 - 39  Yearly exam:   See your health care provider every year in order to  Review health changes.   Discuss preventive care.    Review your medicines if you your doctor has prescribed any.    Until age 30: Get a Pap test every three years (more often if you have had an abnormal result).    After age 30: Talk to your doctor about whether you should have a Pap test every 3 years or have a Pap test with HPV screening every 5 years.   You do not need a Pap test if your uterus was removed (hysterectomy) and you have not had cancer.  You should be tested each year for STDs (sexually transmitted diseases), if you're at risk.   Talk to your provider about how often to have your cholesterol checked.  If you are at risk for diabetes, you should have a diabetes test (fasting glucose).  Shots: Get a flu shot each year. Get a tetanus shot every 10 years.   Nutrition:   Eat at least 5 servings of fruits and vegetables each day.  Eat whole-grain bread, whole-wheat pasta and brown rice instead of white grains and rice.  Get adequate Calcium and Vitamin D.     Lifestyle  Exercise at least 150 minutes a week (30 minutes a day, 5 days of the week). This will help you control your weight and prevent disease.  Limit alcohol to one drink per day.  No smoking.   Wear sunscreen to prevent skin cancer.  See your dentist every six months for an exam and cleaning.

## 2023-10-25 ENCOUNTER — OFFICE VISIT (OUTPATIENT)
Dept: FAMILY MEDICINE | Facility: OTHER | Age: 38
End: 2023-10-25
Attending: FAMILY MEDICINE
Payer: COMMERCIAL

## 2023-10-25 VITALS
DIASTOLIC BLOOD PRESSURE: 80 MMHG | OXYGEN SATURATION: 98 % | TEMPERATURE: 98.8 F | WEIGHT: 184.1 LBS | BODY MASS INDEX: 31.43 KG/M2 | SYSTOLIC BLOOD PRESSURE: 120 MMHG | HEART RATE: 110 BPM | HEIGHT: 64 IN

## 2023-10-25 DIAGNOSIS — R53.83 FATIGUE, UNSPECIFIED TYPE: ICD-10-CM

## 2023-10-25 DIAGNOSIS — I10 ESSENTIAL HYPERTENSION: ICD-10-CM

## 2023-10-25 DIAGNOSIS — G47.00 INSOMNIA, UNSPECIFIED TYPE: ICD-10-CM

## 2023-10-25 DIAGNOSIS — E55.9 VITAMIN D DEFICIENCY: ICD-10-CM

## 2023-10-25 DIAGNOSIS — K21.9 GASTROESOPHAGEAL REFLUX DISEASE, UNSPECIFIED WHETHER ESOPHAGITIS PRESENT: ICD-10-CM

## 2023-10-25 DIAGNOSIS — F41.9 ANXIETY: ICD-10-CM

## 2023-10-25 DIAGNOSIS — G43.009 MIGRAINE WITHOUT AURA AND WITHOUT STATUS MIGRAINOSUS, NOT INTRACTABLE: ICD-10-CM

## 2023-10-25 DIAGNOSIS — R73.09 ELEVATED GLUCOSE: ICD-10-CM

## 2023-10-25 DIAGNOSIS — F17.200 TOBACCO USE DISORDER: ICD-10-CM

## 2023-10-25 DIAGNOSIS — F32.A DEPRESSION, UNSPECIFIED DEPRESSION TYPE: ICD-10-CM

## 2023-10-25 DIAGNOSIS — Z00.00 ROUTINE GENERAL MEDICAL EXAMINATION AT A HEALTH CARE FACILITY: Primary | ICD-10-CM

## 2023-10-25 DIAGNOSIS — R06.83 SNORING: ICD-10-CM

## 2023-10-25 DIAGNOSIS — Z71.6 ENCOUNTER FOR TOBACCO USE CESSATION COUNSELING: ICD-10-CM

## 2023-10-25 PROCEDURE — 99214 OFFICE O/P EST MOD 30 MIN: CPT | Mod: 25 | Performed by: FAMILY MEDICINE

## 2023-10-25 PROCEDURE — G0463 HOSPITAL OUTPT CLINIC VISIT: HCPCS

## 2023-10-25 PROCEDURE — 99395 PREV VISIT EST AGE 18-39: CPT | Performed by: FAMILY MEDICINE

## 2023-10-25 RX ORDER — SUMATRIPTAN 100 MG/1
TABLET, FILM COATED ORAL
Qty: 9 TABLET | Refills: 3 | Status: SHIPPED | OUTPATIENT
Start: 2023-10-25 | End: 2024-04-15

## 2023-10-25 RX ORDER — NICOTINE 21 MG/24HR
1 PATCH, TRANSDERMAL 24 HOURS TRANSDERMAL EVERY 24 HOURS
Qty: 30 PATCH | Refills: 0 | Status: SHIPPED | OUTPATIENT
Start: 2023-10-25

## 2023-10-25 ASSESSMENT — PAIN SCALES - GENERAL: PAINLEVEL: NO PAIN (0)

## 2023-11-02 ENCOUNTER — TELEPHONE (OUTPATIENT)
Dept: PULMONOLOGY | Facility: OTHER | Age: 38
End: 2023-11-02

## 2023-11-06 DIAGNOSIS — K21.9 GASTROESOPHAGEAL REFLUX DISEASE, UNSPECIFIED WHETHER ESOPHAGITIS PRESENT: ICD-10-CM

## 2023-11-06 RX ORDER — PANTOPRAZOLE SODIUM 40 MG/1
TABLET, DELAYED RELEASE ORAL
Qty: 90 TABLET | Refills: 3 | Status: SHIPPED | OUTPATIENT
Start: 2023-11-06

## 2023-11-06 NOTE — TELEPHONE ENCOUNTER
Protonix      Last Written Prescription Date:  8/9/23  Last Fill Quantity: 90,   # refills: 0  Last Office Visit: 10/25/23  Future Office visit:       Routing refill request to provider for review/approval because:

## 2023-12-20 ENCOUNTER — ALLIED HEALTH/NURSE VISIT (OUTPATIENT)
Dept: ALLERGY | Facility: OTHER | Age: 38
End: 2023-12-20
Attending: FAMILY MEDICINE
Payer: COMMERCIAL

## 2023-12-20 DIAGNOSIS — Z30.42 ENCOUNTER FOR SURVEILLANCE OF INJECTABLE CONTRACEPTIVE: Primary | ICD-10-CM

## 2023-12-20 PROCEDURE — 250N000011 HC RX IP 250 OP 636: Mod: JZ | Performed by: FAMILY MEDICINE

## 2023-12-20 PROCEDURE — 96372 THER/PROPH/DIAG INJ SC/IM: CPT | Performed by: FAMILY MEDICINE

## 2023-12-20 RX ORDER — MEDROXYPROGESTERONE ACETATE 150 MG/ML
150 INJECTION, SUSPENSION INTRAMUSCULAR
Status: ACTIVE | OUTPATIENT
Start: 2023-12-20

## 2023-12-20 RX ADMIN — MEDROXYPROGESTERONE ACETATE 150 MG: 150 INJECTION, SUSPENSION INTRAMUSCULAR at 09:59

## 2023-12-20 NOTE — PROGRESS NOTES
Clinic Administered Medication Documentation      Depo Provera Documentation    Depo-Provera Standing Order inclusion/exclusion criteria reviewed.     Is this the initial or subsequent dose of Depo Provera? Subsequent dose - patient is within the acceptable window of time (11-15 weeks) for subsequent injection. Pregnancy test not indicated.    Patient meets: inclusion criteria     Is there an active order (written within the past 365 days, with administrations remaining, not ) in the chart? Yes.     Prior to injection, verified patient identity using patient's name and date of birth. Medication was administered. Please see MAR and medication order for additional information.     Vial/Syringe: Single dose vial. Was entire vial of medication used? Yes    Patient instructed to report any adverse reaction to staff immediately.  NEXT INJECTION DUE: 3/6/24 - 4/3/24    Verified that the patient has refills remaining in their prescription.

## 2023-12-26 DIAGNOSIS — F32.A DEPRESSION, UNSPECIFIED DEPRESSION TYPE: ICD-10-CM

## 2023-12-26 DIAGNOSIS — G43.009 MIGRAINE WITHOUT AURA AND WITHOUT STATUS MIGRAINOSUS, NOT INTRACTABLE: ICD-10-CM

## 2023-12-26 DIAGNOSIS — F41.9 ANXIETY: ICD-10-CM

## 2023-12-26 RX ORDER — VENLAFAXINE HYDROCHLORIDE 150 MG/1
CAPSULE, EXTENDED RELEASE ORAL
Qty: 90 CAPSULE | Refills: 1 | Status: SHIPPED | OUTPATIENT
Start: 2023-12-26 | End: 2024-06-25

## 2023-12-26 NOTE — TELEPHONE ENCOUNTER
venlafaxine (EFFEXOR XR) 150 MG 24 hr capsule       Last Written Prescription Date:  9/22/23  Last Fill Quantity: 90,   # refills: 0  Last Office Visit: 10/25/23  Future Office visit:

## 2024-02-13 DIAGNOSIS — I10 ESSENTIAL HYPERTENSION: ICD-10-CM

## 2024-02-13 RX ORDER — AMLODIPINE BESYLATE 5 MG/1
5 TABLET ORAL DAILY
Qty: 90 TABLET | Refills: 2 | Status: SHIPPED | OUTPATIENT
Start: 2024-02-13

## 2024-02-26 DIAGNOSIS — F41.9 ANXIETY: ICD-10-CM

## 2024-02-26 DIAGNOSIS — G47.00 INSOMNIA, UNSPECIFIED TYPE: ICD-10-CM

## 2024-02-26 RX ORDER — HYDROXYZINE PAMOATE 25 MG/1
CAPSULE ORAL
Qty: 120 CAPSULE | Refills: 2 | Status: SHIPPED | OUTPATIENT
Start: 2024-02-26 | End: 2024-07-01

## 2024-03-27 ENCOUNTER — ALLIED HEALTH/NURSE VISIT (OUTPATIENT)
Dept: ALLERGY | Facility: OTHER | Age: 39
End: 2024-03-27
Attending: FAMILY MEDICINE
Payer: COMMERCIAL

## 2024-03-27 DIAGNOSIS — Z30.42 ENCOUNTER FOR SURVEILLANCE OF INJECTABLE CONTRACEPTIVE: Primary | ICD-10-CM

## 2024-03-27 PROCEDURE — 96372 THER/PROPH/DIAG INJ SC/IM: CPT | Performed by: FAMILY MEDICINE

## 2024-03-27 PROCEDURE — 250N000011 HC RX IP 250 OP 636: Mod: JZ | Performed by: FAMILY MEDICINE

## 2024-03-27 RX ADMIN — MEDROXYPROGESTERONE ACETATE 150 MG: 150 INJECTION, SUSPENSION INTRAMUSCULAR at 11:23

## 2024-03-27 NOTE — PROGRESS NOTES
Clinic Administered Medication Documentation      Depo Provera Documentation    Depo-Provera Standing Order inclusion/exclusion criteria reviewed.     Is this the initial or subsequent dose of Depo Provera? Subsequent dose - patient is within the acceptable window of time (11-15 weeks) for subsequent injection. Pregnancy test not indicated.    Patient meets: inclusion criteria     Is there an active order (written within the past 365 days, with administrations remaining, not ) in the chart? Yes.     Prior to injection, verified patient identity using patient's name and date of birth. Medication was administered. Please see MAR and medication order for additional information.     Vial/Syringe: Single dose vial. Was entire vial of medication used? Yes    Patient instructed to report any adverse reaction to staff immediately.  NEXT INJECTION DUE: 24 - 7/10/24    Verified that the patient has refills remaining in their prescription.

## 2024-04-15 DIAGNOSIS — G43.009 MIGRAINE WITHOUT AURA AND WITHOUT STATUS MIGRAINOSUS, NOT INTRACTABLE: ICD-10-CM

## 2024-04-15 RX ORDER — SUMATRIPTAN 100 MG/1
TABLET, FILM COATED ORAL
Qty: 9 TABLET | Refills: 0 | Status: SHIPPED | OUTPATIENT
Start: 2024-04-15

## 2024-04-15 NOTE — TELEPHONE ENCOUNTER
Imitrex  Last Written Prescription Date: 10/25/23  Last Fill Quantity: 9 # of Refills: 3  Last Office Visit: 10/25/23

## 2024-06-24 DIAGNOSIS — G43.009 MIGRAINE WITHOUT AURA AND WITHOUT STATUS MIGRAINOSUS, NOT INTRACTABLE: ICD-10-CM

## 2024-06-24 DIAGNOSIS — F32.A DEPRESSION, UNSPECIFIED DEPRESSION TYPE: ICD-10-CM

## 2024-06-24 DIAGNOSIS — F41.9 ANXIETY: ICD-10-CM

## 2024-06-25 RX ORDER — VENLAFAXINE HYDROCHLORIDE 150 MG/1
CAPSULE, EXTENDED RELEASE ORAL
Qty: 90 CAPSULE | Refills: 0 | Status: SHIPPED | OUTPATIENT
Start: 2024-06-25 | End: 2024-09-23

## 2024-06-26 ENCOUNTER — ALLIED HEALTH/NURSE VISIT (OUTPATIENT)
Dept: ALLERGY | Facility: OTHER | Age: 39
End: 2024-06-26
Attending: FAMILY MEDICINE
Payer: COMMERCIAL

## 2024-06-26 DIAGNOSIS — Z30.42 ENCOUNTER FOR SURVEILLANCE OF INJECTABLE CONTRACEPTIVE: Primary | ICD-10-CM

## 2024-06-26 PROCEDURE — 96372 THER/PROPH/DIAG INJ SC/IM: CPT | Performed by: FAMILY MEDICINE

## 2024-06-26 PROCEDURE — 250N000011 HC RX IP 250 OP 636: Mod: JZ | Performed by: FAMILY MEDICINE

## 2024-06-26 RX ADMIN — MEDROXYPROGESTERONE ACETATE 150 MG: 150 INJECTION, SUSPENSION INTRAMUSCULAR at 08:23

## 2024-06-26 NOTE — PROGRESS NOTES
Clinic Administered Medication Documentation      Depo Provera Documentation    Depo-Provera Standing Order inclusion/exclusion criteria reviewed.     Is this the initial or subsequent dose of Depo Provera? Subsequent dose - patient is within the acceptable window of time (11-15 weeks) for subsequent injection. Pregnancy test not indicated.    Patient meets: inclusion criteria     Is there an active order (written within the past 365 days, with administrations remaining, not ) in the chart? Yes.     Prior to injection, verified patient identity using patient's name and date of birth. Medication was administered. Please see MAR and medication order for additional information.     Vial/Syringe: Single dose vial. Was entire vial of medication used? Yes    Patient instructed to report any adverse reaction to staff immediately.  NEXT INJECTION DUE: 24 - 10/9/24    Verified that the patient has refills remaining in their prescription.

## 2024-07-01 DIAGNOSIS — F41.9 ANXIETY: ICD-10-CM

## 2024-07-01 DIAGNOSIS — G47.00 INSOMNIA, UNSPECIFIED TYPE: ICD-10-CM

## 2024-07-01 RX ORDER — HYDROXYZINE PAMOATE 25 MG/1
CAPSULE ORAL
Qty: 120 CAPSULE | Refills: 0 | Status: SHIPPED | OUTPATIENT
Start: 2024-07-01 | End: 2024-08-15

## 2024-07-01 NOTE — TELEPHONE ENCOUNTER
Hydroxyzine 25 mg       Last Written Prescription Date:  2/26/24  Last Fill Quantity: 120,   # refills: 2  Last Office Visit: 10/25/23  Future Office visit:       Routing refill request to provider for review/approval because:  Antihistamines Protocol Failed      Medication indicated for associated diagnosis    The medication is associated with one or more of the following diagnoses:  Allergies  Rhinitis  Upper respiratory tract allergy  Urticaria

## 2024-07-15 DIAGNOSIS — E55.9 VITAMIN D DEFICIENCY: ICD-10-CM

## 2024-07-15 RX ORDER — ACETAMINOPHEN 160 MG
1 TABLET,DISINTEGRATING ORAL DAILY
Qty: 90 CAPSULE | Refills: 0 | Status: SHIPPED | OUTPATIENT
Start: 2024-07-15

## 2024-07-15 NOTE — TELEPHONE ENCOUNTER
Vitamin D3      Last Written Prescription Date:  8/7/23  Last Fill Quantity: 90,   # refills: 3  Last Office Visit: 10/25/23  Future Office visit:       Routing refill request to provider for review/approval because:

## 2024-08-15 DIAGNOSIS — F41.9 ANXIETY: ICD-10-CM

## 2024-08-15 DIAGNOSIS — G47.00 INSOMNIA, UNSPECIFIED TYPE: ICD-10-CM

## 2024-08-15 RX ORDER — HYDROXYZINE PAMOATE 25 MG/1
CAPSULE ORAL
Qty: 120 CAPSULE | Refills: 0 | Status: SHIPPED | OUTPATIENT
Start: 2024-08-15 | End: 2024-09-23

## 2024-08-29 ENCOUNTER — E-VISIT (OUTPATIENT)
Dept: URGENT CARE | Facility: CLINIC | Age: 39
End: 2024-08-29
Payer: COMMERCIAL

## 2024-08-29 DIAGNOSIS — N94.9 VAGINAL DISCOMFORT: ICD-10-CM

## 2024-08-29 DIAGNOSIS — R30.0 DYSURIA: Primary | ICD-10-CM

## 2024-08-29 PROCEDURE — 99207 PR NON-BILLABLE SERV PER CHARTING: CPT | Performed by: PHYSICIAN ASSISTANT

## 2024-08-29 RX ORDER — FLUCONAZOLE 150 MG/1
150 TABLET ORAL ONCE
Qty: 1 TABLET | Refills: 0 | Status: SHIPPED | OUTPATIENT
Start: 2024-08-29 | End: 2024-08-29

## 2024-08-29 NOTE — PATIENT INSTRUCTIONS

## 2024-09-11 ENCOUNTER — ALLIED HEALTH/NURSE VISIT (OUTPATIENT)
Dept: ALLERGY | Facility: OTHER | Age: 39
End: 2024-09-11
Attending: FAMILY MEDICINE
Payer: COMMERCIAL

## 2024-09-11 DIAGNOSIS — Z30.42 ENCOUNTER FOR SURVEILLANCE OF INJECTABLE CONTRACEPTIVE: Primary | ICD-10-CM

## 2024-09-11 PROCEDURE — 250N000011 HC RX IP 250 OP 636: Mod: JZ | Performed by: FAMILY MEDICINE

## 2024-09-11 PROCEDURE — 96372 THER/PROPH/DIAG INJ SC/IM: CPT | Performed by: FAMILY MEDICINE

## 2024-09-11 RX ORDER — MEDROXYPROGESTERONE ACETATE 150 MG/ML
150 INJECTION, SUSPENSION INTRAMUSCULAR
Status: ACTIVE | OUTPATIENT
Start: 2024-09-11

## 2024-09-11 RX ADMIN — MEDROXYPROGESTERONE ACETATE 150 MG: 150 INJECTION, SUSPENSION INTRAMUSCULAR at 10:36

## 2024-09-11 NOTE — PROGRESS NOTES
Follow Up Injection    Patient returning during stated date range given at previous visit: Yes      If here at the correct interval:   BP Readings from Last 1 Encounters:   10/25/23 120/80     Wt Readings from Last 1 Encounters:   10/25/23 83.5 kg (184 lb 1.6 oz)       Last Pap/exam date: -      Side effects or problems with last injection?  No.  Date range is given to patient for next dose: 11/27/24-11.25.24    See Medication Note for administration information    Staff Sig: Courtney Silvestre RN

## 2024-09-11 NOTE — PROCEDURES
Clinic Administered Medication Documentation      Depo Provera Documentation    Depo-Provera Standing Order inclusion/exclusion criteria reviewed.     Is this the initial or subsequent dose of Depo Provera? Subsequent dose - patient is within the acceptable window of time (11-15 weeks) for subsequent injection. Pregnancy test not indicated.    Patient meets: inclusion criteria     Is there an active order (written within the past 365 days, with administrations remaining, not ) in the chart? Yes.     Prior to injection, verified patient identity using patient's name and date of birth. Medication was administered. Please see MAR and medication order for additional information.     Vial/Syringe: Single dose vial. Was entire vial of medication used? Yes    Patient instructed to remain in clinic for 15 minutes, report any adverse reaction to staff immediately, and remain in clinic for 15 minutes and report any adverse reaction to staff immediately but patient declined.  NEXT INJECTION DUE: 24 - 24    Verified that the patient has refills remaining in their prescription.

## 2024-09-23 DIAGNOSIS — G43.009 MIGRAINE WITHOUT AURA AND WITHOUT STATUS MIGRAINOSUS, NOT INTRACTABLE: ICD-10-CM

## 2024-09-23 DIAGNOSIS — F41.9 ANXIETY: ICD-10-CM

## 2024-09-23 DIAGNOSIS — G47.00 INSOMNIA, UNSPECIFIED TYPE: ICD-10-CM

## 2024-09-23 DIAGNOSIS — F32.A DEPRESSION, UNSPECIFIED DEPRESSION TYPE: ICD-10-CM

## 2024-09-23 RX ORDER — HYDROXYZINE PAMOATE 25 MG/1
CAPSULE ORAL
Qty: 120 CAPSULE | Refills: 0 | Status: SHIPPED | OUTPATIENT
Start: 2024-09-23

## 2024-09-23 RX ORDER — VENLAFAXINE HYDROCHLORIDE 150 MG/1
CAPSULE, EXTENDED RELEASE ORAL
Qty: 90 CAPSULE | Refills: 1 | Status: SHIPPED | OUTPATIENT
Start: 2024-09-23

## 2024-09-23 NOTE — TELEPHONE ENCOUNTER
Effexor   Last Written Prescription Date:  6/25/2024  Last Fill Quantity: 90,   # refills: 0  Last Office Visit: 10/25/2023  Future Office visit:    Next 5 appointments (look out 90 days)      Nov 07, 2024 8:15 AM  (Arrive by 8:00 AM)  Adult Preventative Visit with Kendra Yanez MD  [facility] [place] ([facility] [place] ) [address]  [place] [address]  [phone]

## 2024-10-14 DIAGNOSIS — E55.9 VITAMIN D DEFICIENCY: ICD-10-CM

## 2024-10-14 RX ORDER — ACETAMINOPHEN 160 MG
1 TABLET,DISINTEGRATING ORAL DAILY
Qty: 30 CAPSULE | Refills: 0 | Status: SHIPPED | OUTPATIENT
Start: 2024-10-14 | End: 2024-11-12

## 2024-10-14 NOTE — TELEPHONE ENCOUNTER
Cholecalciferol (Vitamin D3) 50 MCG (2000 UT) capsules    Last Written Prescription Date:  07/15/2024  Last Fill Quantity: 30,   # refills: 0  Last Office Visit: 10/25/2023

## 2024-11-04 DIAGNOSIS — F41.9 ANXIETY: ICD-10-CM

## 2024-11-04 DIAGNOSIS — G47.00 INSOMNIA, UNSPECIFIED TYPE: ICD-10-CM

## 2024-11-05 RX ORDER — HYDROXYZINE PAMOATE 25 MG/1
CAPSULE ORAL
Qty: 120 CAPSULE | Refills: 0 | Status: SHIPPED | OUTPATIENT
Start: 2024-11-05

## 2024-11-05 NOTE — TELEPHONE ENCOUNTER
hydrOXYzine barrett (VISTARIL) 25 MG capsule       Last Written Prescription Date:  9/23/24  Last Fill Quantity: 120,   # refills: 0  Last Office Visit: 10/25/23  Future Office visit:    Next 5 appointments (look out 90 days)      Nov 07, 2024 8:15 AM  (Arrive by 8:00 AM)  Adult Preventative Visit with Kendra Yanez MD  [facility] ([facility] ) [address]  [place] [address]  [phone]             Routing refill request to provider for review/approval because:  Antihistamines Protocol Failed      Recent (12 mo) or future (30 days) visit within the authorizing provider's specialty    The patient must have completed an in-person or virtual visit within the past 12 months or has a future visit scheduled within the next 90 days with the authorizing provider s specialty.  Urgent care and e-visits do not quality as an office visit for this protocol.

## 2024-11-06 PROBLEM — E55.9 VITAMIN D DEFICIENCY: Status: ACTIVE | Noted: 2024-11-06

## 2024-11-06 PROBLEM — K81.0 ACUTE CHOLECYSTITIS: Status: RESOLVED | Noted: 2021-02-27 | Resolved: 2024-11-06

## 2024-11-07 DIAGNOSIS — K21.9 GASTROESOPHAGEAL REFLUX DISEASE, UNSPECIFIED WHETHER ESOPHAGITIS PRESENT: ICD-10-CM

## 2024-11-07 RX ORDER — PANTOPRAZOLE SODIUM 40 MG/1
TABLET, DELAYED RELEASE ORAL
Qty: 90 TABLET | Refills: 0 | Status: SHIPPED | OUTPATIENT
Start: 2024-11-07

## 2024-11-07 NOTE — TELEPHONE ENCOUNTER
pantoprazole (PROTONIX) 40 MG EC tablet       Last Written Prescription Date:  11/6/23  Last Fill Quantity: 90,   # refills: 3  Last Office Visit: 10/25/23  Future Office visit:       Routing refill request to provider for review/approval because:  PPI Protocol Failed      Recent (12 mo) or future (90 days) visit within the authorizing provider's specialty    The patient must have completed an in-person or virtual visit within the past 12 months or has a future visit scheduled within the next 90 days with the authorizing provider s specialty.  Urgent care and e-visits do not quality as an office visit for this protocol.

## 2024-11-12 DIAGNOSIS — E55.9 VITAMIN D DEFICIENCY: ICD-10-CM

## 2024-11-12 RX ORDER — ACETAMINOPHEN 160 MG
1 TABLET,DISINTEGRATING ORAL DAILY
Qty: 30 CAPSULE | Refills: 0 | Status: SHIPPED | OUTPATIENT
Start: 2024-11-12

## 2024-11-18 DIAGNOSIS — I10 ESSENTIAL HYPERTENSION: ICD-10-CM

## 2024-11-18 NOTE — TELEPHONE ENCOUNTER
Amlodipine      Last Written Prescription Date:  2/13/24  Last Fill Quantity: 90,   # refills: 2  Last Office Visit: 11/7/24  Future Office visit:       Routing refill request to provider for review/approval because:

## 2024-11-19 RX ORDER — AMLODIPINE BESYLATE 5 MG/1
5 TABLET ORAL DAILY
Qty: 90 TABLET | Refills: 0 | Status: SHIPPED | OUTPATIENT
Start: 2024-11-19

## 2024-11-19 NOTE — TELEPHONE ENCOUNTER
Calcium Channel Blockers Protocol  Failed    Rerun Protocol (11/18/2024 8:12 AM)    Most recent blood pressure under 140/90 in past 12 months        BP Readings from Last 3 Encounters:   10/25/23 120/80   06/30/23 124/80   05/26/23 (!) 140/96      No data recorded       GFR is on file in the past 12 months and most recent GFR is normal      Recent (12 mo) or future (90 days) visit within the authorizing provider's specialty

## 2024-12-12 DIAGNOSIS — E55.9 VITAMIN D DEFICIENCY: ICD-10-CM

## 2024-12-12 DIAGNOSIS — G47.00 INSOMNIA, UNSPECIFIED TYPE: ICD-10-CM

## 2024-12-12 DIAGNOSIS — F41.9 ANXIETY: ICD-10-CM

## 2024-12-12 RX ORDER — ACETAMINOPHEN 160 MG
1 TABLET,DISINTEGRATING ORAL DAILY
Qty: 30 CAPSULE | Refills: 0 | Status: SHIPPED | OUTPATIENT
Start: 2024-12-12

## 2024-12-12 RX ORDER — HYDROXYZINE PAMOATE 25 MG/1
CAPSULE ORAL
Qty: 120 CAPSULE | Refills: 0 | Status: SHIPPED | OUTPATIENT
Start: 2024-12-12

## 2024-12-12 NOTE — TELEPHONE ENCOUNTER
Cholecalciferol (VITAMIN D3) 50 MCG (2000 UT) CAPS         Last Written Prescription Date:  11/12/24  Last Fill Quantity: 30,   # refills: 0  Last Office Visit: 10/25/23  Future Office visit:       Routing refill request to provider for review/approval because:  Antihistamines Protocol Failed      Recent (12 mo) or future (30 days) visit within the authorizing provider's specialty    The patient must have completed an in-person or virtual visit within the past 12 months or has a future visit scheduled within the next 90 days with the authorizing provider s specialty.  Urgent care and e-visits do not qualify as an office visit for this protocol.          hydrOXYzine barrett (VISTARIL) 25 MG capsule         Last Written Prescription Date:  11/5/24  Last Fill Quantity: 120,   # refills: 0  Last Office Visit: 10/25/23  Future Office visit:

## 2024-12-18 ENCOUNTER — ALLIED HEALTH/NURSE VISIT (OUTPATIENT)
Dept: ALLERGY | Facility: OTHER | Age: 39
End: 2024-12-18
Attending: FAMILY MEDICINE
Payer: COMMERCIAL

## 2024-12-18 DIAGNOSIS — Z30.42 ENCOUNTER FOR SURVEILLANCE OF INJECTABLE CONTRACEPTIVE: Primary | ICD-10-CM

## 2024-12-18 PROCEDURE — 96372 THER/PROPH/DIAG INJ SC/IM: CPT | Performed by: FAMILY MEDICINE

## 2024-12-18 PROCEDURE — 250N000011 HC RX IP 250 OP 636: Mod: JZ | Performed by: FAMILY MEDICINE

## 2024-12-18 RX ADMIN — MEDROXYPROGESTERONE ACETATE 150 MG: 150 INJECTION, SUSPENSION INTRAMUSCULAR at 14:15

## 2024-12-18 NOTE — PROGRESS NOTES
Clinic Administered Medication Documentation      Depo Provera Documentation    Depo-Provera Standing Order inclusion/exclusion criteria reviewed.     Is this the initial or subsequent dose of Depo Provera? Subsequent dose - patient is within the acceptable window of time (11-15 weeks) for subsequent injection. Pregnancy test not indicated.    Patient meets: inclusion criteria     Is there an active order (written within the past 365 days, with administrations remaining, not ) in the chart? Yes.     Prior to injection, verified patient identity using patient's name and date of birth. Medication was administered. Please see MAR and medication order for additional information.     Vial/Syringe: Single dose vial. Was entire vial of medication used? Yes    Patient instructed to report any adverse reaction to staff immediately.  NEXT INJECTION DUE: 3/5/25 - 25    Verified that the patient has refills remaining in their prescription.

## 2025-01-15 DIAGNOSIS — E55.9 VITAMIN D DEFICIENCY: ICD-10-CM

## 2025-01-15 RX ORDER — ACETAMINOPHEN 160 MG
1 TABLET,DISINTEGRATING ORAL DAILY
Qty: 30 CAPSULE | Refills: 2 | Status: SHIPPED | OUTPATIENT
Start: 2025-01-15

## 2025-01-15 NOTE — TELEPHONE ENCOUNTER
Cholecalciferol (VITAMIN D3) 50 MCG (2000 UT) CAPS       Last Written Prescription Date:  12/12/24  Last Fill Quantity: 30,   # refills: 0  Last Office Visit: 10/25/23  Future Office visit:       Routing refill request to provider for review/approval because:  Vitamin Supplements Protocol Failed      Recent (12 mo) or future (90 days) visit within the authorizing provider's specialty    The patient must have completed an in-person or virtual visit within the past 12 months or has a future visit scheduled within the next 90 days with the authorizing provider s specialty.  Urgent care and e-visits do not qualify as an office visit for this protocol.

## 2025-01-22 DIAGNOSIS — G47.00 INSOMNIA, UNSPECIFIED TYPE: ICD-10-CM

## 2025-01-22 DIAGNOSIS — F41.9 ANXIETY: ICD-10-CM

## 2025-01-22 RX ORDER — HYDROXYZINE PAMOATE 25 MG/1
CAPSULE ORAL
Qty: 120 CAPSULE | Refills: 0 | Status: SHIPPED | OUTPATIENT
Start: 2025-01-22

## 2025-01-22 NOTE — TELEPHONE ENCOUNTER
hydrOXYzine barrett (VISTARIL) 25 MG capsul       Last Written Prescription Date:  12/12/24  Last Fill Quantity: 120,   # refills: 0  Last Office Visit: 10/25/23  Future Office visit:       Routing refill request to provider for review/approval because:

## 2025-03-03 DIAGNOSIS — F41.9 ANXIETY: ICD-10-CM

## 2025-03-03 DIAGNOSIS — G47.00 INSOMNIA, UNSPECIFIED TYPE: ICD-10-CM

## 2025-03-03 RX ORDER — HYDROXYZINE PAMOATE 25 MG/1
CAPSULE ORAL
Qty: 120 CAPSULE | Refills: 0 | Status: SHIPPED | OUTPATIENT
Start: 2025-03-03

## 2025-03-03 NOTE — TELEPHONE ENCOUNTER
hydrOXYzine barrett (VISTARIL) 25 MG capsule       Last Written Prescription Date:  1/22/2025  Last Fill Quantity: 120,   # refills: 0  Last Office Visit: 10/25/2023  Future Office visit:    Next 5 appointments (look out 90 days)      Apr 03, 2025 8:15 AM  (Arrive by 8:00 AM)  Provider Visit with Kendra Yanez MD  [facility] [place] ([facility] [place] ) [address]  [place] [address]  [phone]     May 23, 2025 8:15 AM  (Arrive by 8:00 AM)  Adult Preventative Visit with Kendra Yanez MD  [facility] [place] ([facility] [place] ) [address]  [place] [address]  [phone]

## 2025-03-03 NOTE — TELEPHONE ENCOUNTER
Antihistamines Protocol [id number]/03/2025 08:34 AM   Protocol Details Recent (12 month) or future (90 days) visit with authorizing provider s specialty          Next 5 appointments (look out 90 days)      Apr 03, 2025 8:15 AM  (Arrive by 8:00 AM)  Provider Visit with Kendra Yanez MD  [facility] [place] ([facility] [place] ) [address]  [place] [address]  [phone]     May 23, 2025 8:15 AM  (Arrive by 8:00 AM)  Adult Preventative Visit with Kendra Yanez MD  [facility] [place] ([facility] [place] ) [address]  [place] [address]  [phone]

## 2025-03-20 DIAGNOSIS — I10 ESSENTIAL HYPERTENSION: ICD-10-CM

## 2025-03-20 RX ORDER — AMLODIPINE BESYLATE 5 MG/1
5 TABLET ORAL DAILY
Qty: 30 TABLET | Refills: 0 | Status: SHIPPED | OUTPATIENT
Start: 2025-03-20

## 2025-03-20 NOTE — TELEPHONE ENCOUNTER
Rudy Warren,  Thanks for ordering xrays for Bernard.  I see they are negative for fracture.  I told the grandmother we will resume evaluation next session if xrays were negative.  Thanks again, I feel better about moving him around now because symptoms are unusual.  Phoebe   amLODIPine (NORVASC) 5 MG tablet       Last Written Prescription Date:  2/17/25  Last Fill Quantity: 30,   # refills: 0  Last Office Visit: 10/25/23  Future Office visit:    Next 5 appointments (look out 90 days)      Apr 03, 2025 8:15 AM  (Arrive by 8:00 AM)  Provider Visit with Kendra Yanez MD  [facility] [place] ([facility] [place] ) [address] [place] [facility] [address]  [phone]     May 23, 2025 8:15 AM  (Arrive by 8:00 AM)  Adult Preventative Visit with Kendra Yanez MD  [facility] [place] ([facility] [place] ) [address] [place] [address]  [phone]             Routing refill request to provider for review/approval because:  Calcium Channel Blockers Protocol  Failed      Most recent blood pressure under 140/90 in past 12 months        BP Readings from Last 3 Encounters:   10/25/23 120/80   06/30/23 124/80   05/26/23 (!) 140/96      No data recorded       GFR is on file in the past 12 months and most recent GFR is normal      Recent (12 mo) or future (90 days) visit within the authorizing provider's specialty    The patient must have completed an in-person or virtual visit within the past 12 months or has a future visit scheduled within the next 90 days with the authorizing provider s specialty.  Urgent care and e-visits do not qualify as an office visit for this protocol.

## 2025-03-26 ENCOUNTER — ALLIED HEALTH/NURSE VISIT (OUTPATIENT)
Dept: ALLERGY | Facility: OTHER | Age: 40
End: 2025-03-26
Attending: FAMILY MEDICINE
Payer: COMMERCIAL

## 2025-03-26 DIAGNOSIS — Z30.42 ENCOUNTER FOR SURVEILLANCE OF INJECTABLE CONTRACEPTIVE: Primary | ICD-10-CM

## 2025-03-26 PROCEDURE — 96372 THER/PROPH/DIAG INJ SC/IM: CPT | Performed by: FAMILY MEDICINE

## 2025-03-26 PROCEDURE — 250N000011 HC RX IP 250 OP 636: Mod: JZ | Performed by: FAMILY MEDICINE

## 2025-03-26 RX ADMIN — MEDROXYPROGESTERONE ACETATE 150 MG: 150 INJECTION, SUSPENSION INTRAMUSCULAR at 09:59

## 2025-03-26 NOTE — PROGRESS NOTES
Clinic Administered Medication Documentation      Depo Provera Documentation    Depo-Provera Standing Order inclusion/exclusion criteria reviewed.     Is this the initial or subsequent dose of Depo Provera? Subsequent dose - patient is within the acceptable window of time (11-15 weeks) for subsequent injection. Pregnancy test not indicated.    Patient meets: inclusion criteria     Is there an active order (written within the past 365 days, with administrations remaining, not ) in the chart? Yes.     Prior to injection, verified patient identity using patient's name and date of birth. Medication was administered. Please see MAR and medication order for additional information.     Vial/Syringe: Single dose vial. Was entire vial of medication used? Yes    Patient instructed to report any adverse reaction to staff immediately. Patient ambulated out of the clinic unit independently, denies any previous reactions to the injections prior to injection, aware to seek medical attention for any reaction symptoms, and ambulated out of the clinic unit independently.     NEXT INJECTION DUE: 25 - 25    Verified that the patient has refills remaining in their prescription.

## 2025-04-03 NOTE — PROGRESS NOTES
Assessment & Plan     Essential hypertension  At goal.  Continue Norvasc.  Mild pedal edema - but may be weight related.  Lifestyle modifications discussed at length.    Migraine without aura and without status migrainosus, not intractable  Were stable.  Then had withdrawal from Effexor XR.  Prn Imitrex, Compazine.  Will see if needing new prophylactic, such as Topamax.  - Comprehensive metabolic panel (BMP + Alb, Alk Phos, ALT, AST, Total. Bili, TP); Future  - CBC with platelets and differential; Future    Gastroesophageal reflux disease, unspecified whether esophagitis present  Stable with Protonix 40 mg.   Does HS OTC Pepcid.  Will try BID Pepcid and reduce PPI use.  Discussed risk of osteoporosis with chronic use.  - CBC with platelets and differential; Future    Irritable bowel syndrome with both constipation and diarrhea  Waxes and wanes.  Unpredictable.  Letter for jury duty.    Anxiety  Episode of recurrent major depressive disorder, unspecified depression episode severity  Off Effexor XR.  Trial of Wellbutrin SR - for smoking and mood.  Weight neutral.  Continue prn Vistaril as well.  - buPROPion (WELLBUTRIN SR) 150 MG 12 hr tablet; Take 1 tablet (150 mg) by mouth 2 times daily.    Insomnia, unspecified type  Stable.    Tobacco use disorder  Has cut back 1 ppd to 1/2 ppd.  Start Zyban - counseled on use.  - buPROPion (WELLBUTRIN SR) 150 MG 12 hr tablet; Take 1 tablet (150 mg) by mouth 2 times daily.    Encounter for tobacco use cessation counseling  As above.  - buPROPion (WELLBUTRIN SR) 150 MG 12 hr tablet; Take 1 tablet (150 mg) by mouth 2 times daily.    Vitamin D deficiency  - Vitamin D Deficiency; Future    Screening for diabetes mellitus  - Hemoglobin A1c; Future    BMI 37.0-37.9, adult  Extensive counseling on diet, exercise, weight.  Updating labs.  Defer lipids to physical next month fasting.  Referral to nutritionist.  Consider GLP1 agonist - needs to check on coverage with insurance.  - Adult  "Nutrition  Referral  - TSH with free T4 reflex; Future  - Insulin level; Future    Encounter for weight management  As above.  - Adult Nutrition  Referral  - Insulin level; Future          Nicotine/Tobacco Cessation  She reports that she has been smoking cigarettes. She has a 9 pack-year smoking history. She has never used smokeless tobacco.  Nicotine/Tobacco Cessation Plan  Pharmacotherapies : bupropion (Zyban)      BMI  Estimated body mass index is 37.58 kg/m  as calculated from the following:    Height as of this encounter: 1.613 m (5' 3.5\").    Weight as of this encounter: 97.8 kg (215 lb 8 oz).   Weight management plan: Discussed healthy diet and exercise guidelines    The longitudinal plan of care for the diagnosis(es)/condition(s) as documented were addressed during this visit. Due to the added complexity in care, I will continue to support Aubree in the subsequent management and with ongoing continuity of care.  See Patient Instructions    Return for scheduled physical next month.    Subjective   Aubree is a 39 year old, presenting for the following health issues:  Hypertension, Depression, Anxiety, Contraception, and Headache        6/30/2023     9:30 AM   Additional Questions   Roomed by Jane Osullivan     History of Present Illness       Reason for visit:  They didn t give me a choice   She is taking medications regularly.        Tobacco - wants to quit smoking. Has tried pills patches and feels like it never helped. Would like to discuss another medication that can help with this along with her mental health. Wants to talk about starting Wellbutrin.   Current 1/2 ppd; prior 1 ppd; since age 18    Vaccines - declines    Physical/pap - is scheduled next month for her annual physical 5/23/25  Depo Provera  - amenorrhea - prefers to not have menses.  Struggling with weight gain.  Has been on it for 10-15 years.  Past 2 years weight 180 to 215.  BMI 37  Feels more short of breath, " deconditioned.  Exercise - plans to start; now working at [employer]; has gym; plans to do 30-45 min after work  Diet - meals - variable - works night shift; tries to do 2  Nutritionist - interested  GLP1 - interested - no prior thyroid cancer, MEN or pancreatitisl.    Labs - did not fast today     GERD and IBS - Protonix 40 mg - severe heart burn if misses doses.  No prior EGD.  Takes Pepcid HS.  IBS symptoms - cramping, diarrhea - unpredictable.    Summoned for jury duty in May.    Hypertension Follow-up - Norvasc 5 mg  Do you check your blood pressure regularly outside of the clinic? No   Are you following a low salt diet? No  Are your blood pressures ever more than 140 on the top number (systolic) OR more   than 90 on the bottom number (diastolic), for example 140/90? No    Depression and Anxiety - prior Effexor XR   How are you doing with your depression since your last visit? Worsened had to stop medication, was unable to get refills. Wants to dicuss starting a different medication   How are you doing with your anxiety since your last visit?  Worsened   Are you having other symptoms that might be associated with depression or anxiety? No  Have you had a significant life event? No   Do you have any concerns with your use of alcohol or other drugs? No  Had issues getting filled; had withdrawal - x 2 weeks- severe migraines, stomach pains, diarrhea, crying  Prn vistaril - TID - anxiety    Social History     Tobacco Use     Smoking status: Every Day     Current packs/day: 0.50     Average packs/day: 0.5 packs/day for 18.0 years (9.0 ttl pk-yrs)     Types: Cigarettes     Smokeless tobacco: Never     Tobacco comments:     Considering quitting   Vaping Use     Vaping status: Never Used   Substance Use Topics     Alcohol use: Not Currently     Comment: Maybe once a year if that     Drug use: Never     Types: Marijuana         12/21/2022     9:24 AM 6/30/2023     9:29 AM 11/6/2024     9:00 AM   PHQ   PHQ-9 Total Score 0 1 9     Q9: Thoughts of better off dead/self-harm past 2 weeks Not at all Not at all  Not at all       Patient-reported    Proxy-reported         12/21/2022     9:24 AM 6/30/2023     9:29 AM 4/9/2025     9:03 AM   JESSICA-7 SCORE   Total Score  0 (minimal anxiety)    Total Score 0 0 14         4/9/2025     9:02 AM   Last PHQ-9   1.  Little interest or pleasure in doing things 2   2.  Feeling down, depressed, or hopeless 2   3.  Trouble falling or staying asleep, or sleeping too much 2   4.  Feeling tired or having little energy 2   5.  Poor appetite or overeating 2   6.  Feeling bad about yourself 2   7.  Trouble concentrating 2   8.  Moving slowly or restless 2         4/9/2025     9:03 AM   JESSICA-7    1. Feeling nervous, anxious, or on edge 2   2. Not being able to stop or control worrying 2   3. Worrying too much about different things 2   4. Trouble relaxing 2   5. Being so restless that it is hard to sit still 2   6. Becoming easily annoyed or irritable 2   7. Feeling afraid, as if something awful might happen 2   JESSICA-7 Total Score 14       Suicide Assessment Five-step Evaluation and Treatment (SAFE-T)      Migraine - Imitrex prn  Since your last clinic visit, how have your headaches changed?  Worsened  How often are you getting headaches or migraines? Yesterday    Are you able to do normal daily activities when you have a migraine? No  Are you taking rescue/relief medications? (Select all that apply) sumatriptan (Imitrex)  How helpful is your rescue/relief medication?  I get total relief  Are you taking any medications to prevent migraines? (Select all that apply)  No  In the past 4 weeks, how often have you gone to urgent care or the emergency room because of your headaches?  0  Had been very well controlled    Concern - discuss switching from the depo  Onset: ongoing  Description: discuss switching from the injection to mayby pill form   Intensity: mild  Progression of Symptoms:  n/a  Accompanying Signs & Symptoms:  "n/a  Previous history of similar problem: yes  Precipitating factors:        Worsened by: none  Alleviating factors:        Improved by: none  Therapies tried and outcome: currently doing the injection every 3 months       Review of Systems  Constitutional, HEENT, cardiovascular, pulmonary, gi and gu systems are negative, except as otherwise noted.      Objective    /76 (BP Location: Left arm, Patient Position: Sitting, Cuff Size: Adult Large)   Pulse 94   Temp 98.3  F (36.8  C) (Temporal)   Resp 20   Ht 1.613 m (5' 3.5\")   Wt 97.8 kg (215 lb 8 oz)   LMP  (LMP Unknown)   SpO2 98%   BMI 37.58 kg/m    Body mass index is 37.58 kg/m .  Physical Exam   GENERAL: alert, no distress, and obese  EYES: Eyes grossly normal to inspection, PERRL and conjunctivae and sclerae normal  HENT: ear canals and TM's normal, nose and mouth without ulcers or lesions  NECK: no adenopathy, no asymmetry, masses, or scars  RESP: lungs clear to auscultation - no rales, rhonchi or wheezes  CV: regular rate and rhythm, normal S1 S2, no S3 or S4, no murmur, click or rub, no peripheral edema  ABDOMEN: soft, nontender, no hepatosplenomegaly, no masses and bowel sounds normal  MS: trace edema to bilateral lower legs and peripheral pulses normal  SKIN: no suspicious lesions or rashes  NEURO: Normal strength and tone, mentation intact and speech normal  PSYCH: mentation appears normal, affect normal/bright    Labs pending.        Signed Electronically by: Kendra Bailey MD    "

## 2025-04-09 ENCOUNTER — MYC MEDICAL ADVICE (OUTPATIENT)
Dept: FAMILY MEDICINE | Facility: OTHER | Age: 40
End: 2025-04-09

## 2025-04-09 ENCOUNTER — OFFICE VISIT (OUTPATIENT)
Dept: FAMILY MEDICINE | Facility: OTHER | Age: 40
End: 2025-04-09
Attending: FAMILY MEDICINE
Payer: COMMERCIAL

## 2025-04-09 VITALS
HEIGHT: 64 IN | OXYGEN SATURATION: 98 % | WEIGHT: 215.5 LBS | HEART RATE: 94 BPM | SYSTOLIC BLOOD PRESSURE: 122 MMHG | TEMPERATURE: 98.3 F | DIASTOLIC BLOOD PRESSURE: 76 MMHG | RESPIRATION RATE: 20 BRPM | BODY MASS INDEX: 36.79 KG/M2

## 2025-04-09 DIAGNOSIS — K58.2 IRRITABLE BOWEL SYNDROME WITH BOTH CONSTIPATION AND DIARRHEA: ICD-10-CM

## 2025-04-09 DIAGNOSIS — Z76.89 ENCOUNTER FOR WEIGHT MANAGEMENT: ICD-10-CM

## 2025-04-09 DIAGNOSIS — R73.09 ELEVATED GLUCOSE: ICD-10-CM

## 2025-04-09 DIAGNOSIS — G43.009 MIGRAINE WITHOUT AURA AND WITHOUT STATUS MIGRAINOSUS, NOT INTRACTABLE: ICD-10-CM

## 2025-04-09 DIAGNOSIS — Z13.1 SCREENING FOR DIABETES MELLITUS: ICD-10-CM

## 2025-04-09 DIAGNOSIS — F17.200 TOBACCO USE DISORDER: ICD-10-CM

## 2025-04-09 DIAGNOSIS — E88.819 INSULIN RESISTANCE: ICD-10-CM

## 2025-04-09 DIAGNOSIS — E55.9 VITAMIN D DEFICIENCY: ICD-10-CM

## 2025-04-09 DIAGNOSIS — F33.9 EPISODE OF RECURRENT MAJOR DEPRESSIVE DISORDER, UNSPECIFIED DEPRESSION EPISODE SEVERITY: ICD-10-CM

## 2025-04-09 DIAGNOSIS — F41.9 ANXIETY: ICD-10-CM

## 2025-04-09 DIAGNOSIS — R73.03 PREDIABETES: ICD-10-CM

## 2025-04-09 DIAGNOSIS — I10 ESSENTIAL HYPERTENSION: Primary | ICD-10-CM

## 2025-04-09 DIAGNOSIS — G47.00 INSOMNIA, UNSPECIFIED TYPE: ICD-10-CM

## 2025-04-09 DIAGNOSIS — K21.9 GASTROESOPHAGEAL REFLUX DISEASE, UNSPECIFIED WHETHER ESOPHAGITIS PRESENT: ICD-10-CM

## 2025-04-09 DIAGNOSIS — Z71.6 ENCOUNTER FOR TOBACCO USE CESSATION COUNSELING: ICD-10-CM

## 2025-04-09 LAB
ALBUMIN SERPL BCG-MCNC: 4.1 G/DL (ref 3.5–5.2)
ALP SERPL-CCNC: 72 U/L (ref 40–150)
ALT SERPL W P-5'-P-CCNC: 18 U/L (ref 0–50)
ANION GAP SERPL CALCULATED.3IONS-SCNC: 11 MMOL/L (ref 7–15)
AST SERPL W P-5'-P-CCNC: 19 U/L (ref 0–45)
BASOPHILS # BLD AUTO: 0.1 10E3/UL (ref 0–0.2)
BASOPHILS NFR BLD AUTO: 1 %
BILIRUB SERPL-MCNC: 0.2 MG/DL
BUN SERPL-MCNC: 13.1 MG/DL (ref 6–20)
CALCIUM SERPL-MCNC: 9 MG/DL (ref 8.8–10.4)
CHLORIDE SERPL-SCNC: 106 MMOL/L (ref 98–107)
CREAT SERPL-MCNC: 0.84 MG/DL (ref 0.51–0.95)
EGFRCR SERPLBLD CKD-EPI 2021: 90 ML/MIN/1.73M2
EOSINOPHIL # BLD AUTO: 0.2 10E3/UL (ref 0–0.7)
EOSINOPHIL NFR BLD AUTO: 2 %
ERYTHROCYTE [DISTWIDTH] IN BLOOD BY AUTOMATED COUNT: 14.2 % (ref 10–15)
EST. AVERAGE GLUCOSE BLD GHB EST-MCNC: 123 MG/DL
GLUCOSE SERPL-MCNC: 105 MG/DL (ref 70–99)
HBA1C MFR BLD: 5.9 %
HCO3 SERPL-SCNC: 23 MMOL/L (ref 22–29)
HCT VFR BLD AUTO: 39.1 % (ref 35–47)
HGB BLD-MCNC: 13.1 G/DL (ref 11.7–15.7)
IMM GRANULOCYTES # BLD: 0 10E3/UL
IMM GRANULOCYTES NFR BLD: 0 %
INSULIN SERPL-ACNC: 45.7 UU/ML (ref 2.6–24.9)
LYMPHOCYTES # BLD AUTO: 4 10E3/UL (ref 0.8–5.3)
LYMPHOCYTES NFR BLD AUTO: 34 %
MCH RBC QN AUTO: 28.9 PG (ref 26.5–33)
MCHC RBC AUTO-ENTMCNC: 33.5 G/DL (ref 31.5–36.5)
MCV RBC AUTO: 86 FL (ref 78–100)
MONOCYTES # BLD AUTO: 0.7 10E3/UL (ref 0–1.3)
MONOCYTES NFR BLD AUTO: 6 %
NEUTROPHILS # BLD AUTO: 6.9 10E3/UL (ref 1.6–8.3)
NEUTROPHILS NFR BLD AUTO: 58 %
NRBC # BLD AUTO: 0 10E3/UL
NRBC BLD AUTO-RTO: 0 /100
PLATELET # BLD AUTO: 401 10E3/UL (ref 150–450)
POTASSIUM SERPL-SCNC: 3.9 MMOL/L (ref 3.4–5.3)
PROT SERPL-MCNC: 7.1 G/DL (ref 6.4–8.3)
RBC # BLD AUTO: 4.53 10E6/UL (ref 3.8–5.2)
SODIUM SERPL-SCNC: 140 MMOL/L (ref 135–145)
TSH SERPL DL<=0.005 MIU/L-ACNC: 2.29 UIU/ML (ref 0.3–4.2)
VIT D+METAB SERPL-MCNC: 23 NG/ML (ref 20–50)
WBC # BLD AUTO: 11.9 10E3/UL (ref 4–11)

## 2025-04-09 PROCEDURE — 82435 ASSAY OF BLOOD CHLORIDE: CPT | Mod: ZL | Performed by: FAMILY MEDICINE

## 2025-04-09 PROCEDURE — 83525 ASSAY OF INSULIN: CPT | Mod: ZL | Performed by: FAMILY MEDICINE

## 2025-04-09 PROCEDURE — G0463 HOSPITAL OUTPT CLINIC VISIT: HCPCS

## 2025-04-09 PROCEDURE — 36415 COLL VENOUS BLD VENIPUNCTURE: CPT | Mod: ZL | Performed by: FAMILY MEDICINE

## 2025-04-09 PROCEDURE — 85004 AUTOMATED DIFF WBC COUNT: CPT | Mod: ZL | Performed by: FAMILY MEDICINE

## 2025-04-09 PROCEDURE — 84443 ASSAY THYROID STIM HORMONE: CPT | Mod: ZL | Performed by: FAMILY MEDICINE

## 2025-04-09 PROCEDURE — 84155 ASSAY OF PROTEIN SERUM: CPT | Mod: ZL | Performed by: FAMILY MEDICINE

## 2025-04-09 PROCEDURE — 82306 VITAMIN D 25 HYDROXY: CPT | Mod: ZL | Performed by: FAMILY MEDICINE

## 2025-04-09 PROCEDURE — 83036 HEMOGLOBIN GLYCOSYLATED A1C: CPT | Mod: ZL | Performed by: FAMILY MEDICINE

## 2025-04-09 PROCEDURE — 85018 HEMOGLOBIN: CPT | Mod: ZL | Performed by: FAMILY MEDICINE

## 2025-04-09 RX ORDER — BUPROPION HYDROCHLORIDE 150 MG/1
150 TABLET, EXTENDED RELEASE ORAL 2 TIMES DAILY
Qty: 60 TABLET | Refills: 2 | Status: SHIPPED | OUTPATIENT
Start: 2025-04-09

## 2025-04-09 ASSESSMENT — PAIN SCALES - GENERAL: PAINLEVEL_OUTOF10: NO PAIN (0)

## 2025-04-09 ASSESSMENT — ANXIETY QUESTIONNAIRES
3. WORRYING TOO MUCH ABOUT DIFFERENT THINGS: MORE THAN HALF THE DAYS
1. FEELING NERVOUS, ANXIOUS, OR ON EDGE: MORE THAN HALF THE DAYS
4. TROUBLE RELAXING: MORE THAN HALF THE DAYS
7. FEELING AFRAID AS IF SOMETHING AWFUL MIGHT HAPPEN: MORE THAN HALF THE DAYS
GAD7 TOTAL SCORE: 14
5. BEING SO RESTLESS THAT IT IS HARD TO SIT STILL: MORE THAN HALF THE DAYS
6. BECOMING EASILY ANNOYED OR IRRITABLE: MORE THAN HALF THE DAYS
GAD7 TOTAL SCORE: 14
2. NOT BEING ABLE TO STOP OR CONTROL WORRYING: MORE THAN HALF THE DAYS

## 2025-04-09 NOTE — PATIENT INSTRUCTIONS
Reach out to clinic if any concerns with running out of medications, etc.   Pharmacy too.    Off Effexor XR.  Start Wellbutrin  mg twice daily.  This is for smoking cessation and mood.  Start 1 week prior to quit date.    Check with insurance on coverage of weight loss medications - call number back of card.    Nutrition referral placed.  They will call you.,    Trial of OTC Pepcid 20 mg twice daily.  Use the Protonix only if needed.  Ideally, would like to get off the Protonix or at least down to 20 mg.     Labs today except for lipids - needs to be fasting.  Will do that next month at physical.

## 2025-04-09 NOTE — LETTER
2025    Aubree Corcoran   1985        To Whom it May Concern;    Please excuse Aubere Corcoran from jury duty given chronic medical conditions, including irritable bowel syndrome, with unpredictable symptoms, requiring frequent use of the restroom.    Sincerely,        Kendra Bailey MD

## 2025-04-10 PROBLEM — E88.819 INSULIN RESISTANCE: Status: ACTIVE | Noted: 2025-04-10

## 2025-04-10 PROBLEM — R73.03 PREDIABETES: Status: ACTIVE | Noted: 2025-04-10

## 2025-04-23 ENCOUNTER — TELEPHONE (OUTPATIENT)
Dept: FAMILY MEDICINE | Facility: OTHER | Age: 40
End: 2025-04-23

## 2025-04-23 NOTE — TELEPHONE ENCOUNTER
Received PA APPROVAL from [employer] for semaglutide-weight management (WEGOVY) 0.25 MG/0.5ML pen. Dates 01/23/2025 - 10/23/2025

## 2025-04-23 NOTE — TELEPHONE ENCOUNTER
Received PA request via messages for semaglutide-weight management (WEGOVY) 0.25 MG/0.5ML pen. Bypassed [employer] ins info. Submitted on [employer], waiting for response.

## 2025-04-30 ENCOUNTER — HOSPITAL ENCOUNTER (OUTPATIENT)
Dept: EDUCATION SERVICES | Facility: HOSPITAL | Age: 40
Discharge: HOME | End: 2025-04-30
Attending: FAMILY MEDICINE
Payer: COMMERCIAL

## 2025-04-30 VITALS — BODY MASS INDEX: 35.53 KG/M2 | HEIGHT: 64 IN | WEIGHT: 208.1 LBS

## 2025-04-30 PROCEDURE — 97802 MEDICAL NUTRITION INDIV IN: CPT | Performed by: DIETITIAN, REGISTERED

## 2025-04-30 NOTE — PROGRESS NOTES
"[facility]  Medical Nutrition Therapy    Visit Type: Initial Visit/New Problem    Patient Referred by: Kendra Yanez MD     Referred Diagnosis:   Z68.37 (ICD-10-CM) - BMI 37.0-37.9, adult   Z76.89 (ICD-10-CM) - Encounter for weight management         Nutrition Assessment  Anthropometrics:  Height: 5' 3.5\"  BMI: Body mass index is 36.29 kg/m .    Weight: 208 lbs 1.6 oz  Weight Change: down 7lbs in 3 weeks     Wt Readings from Last 10 Encounters:   04/30/25 94.4 kg (208 lb 1.6 oz)   04/09/25 97.8 kg (215 lb 8 oz)   10/25/23 83.5 kg (184 lb 1.6 oz)   06/30/23 84.4 kg (186 lb)   05/26/23 85.8 kg (189 lb 1.6 oz)   01/23/23 83.1 kg (183 lb 4.8 oz)   01/11/23 81.6 kg (180 lb)   08/30/22 81.6 kg (180 lb)   04/22/22 87.5 kg (193 lb)   03/19/21 83.9 kg (185 lb)        Nutrition History:  Diet- intake is variable due to working night shift. She was drinking drinking about 4-5 cans of pop, has now cut back to 1 per day. Cut back on high sugar foods. Likes some fruit, only a few non-starchy veggies Has increasing exercise.     Works midnights, intake is sporadic. Always eats lunch, sometimes eats in the middle of the night at work. Typically would be junk food. First day off of work she sleeps all day. The next day a bit more regular. Tries to stick close to night shift wake/sleeping hours.     Eating out- almost everyday that she is off 3-4 x a week. Has not cut back to 1x per week.     Food Record:  Gets up at 8pm, works at 11pm  Starts eating at 12pm/1am (snacking half the night): tried to change to crackers. Use to be chips/candy.   Noon/1pm: frozen pizza or order out (which she has cut back on)  Then back to bed after lunch  Beverages- cut back to 1 can of pop per day (was drinking 4-5 cans per day), drinking water, a few bottles per day      Physical Activity:  Tried walking with 5lbs weights, tried treadmill 15 min at a time. Weighted kenzie chicas (2,12min sessions per day), stomach exercises, arm " exercises- at work. 4 hours about light-moderate activity. Friend to walk with this summer.     Medical History/Family History:  HTN, IBS, GERD, tobacco use family hx of heart disease    Reduction in smoking- down to 4 cigarettes per day    Medications:  Current Outpatient Medications   Medication Sig Dispense Refill    amLODIPine (NORVASC) 5 MG tablet TAKE 1 TABLET BY MOUTH DAILY 90 tablet 3    buPROPion (WELLBUTRIN SR) 150 MG 12 hr tablet Take 1 tablet (150 mg) by mouth 2 times daily. 60 tablet 2    chlorhexidine (PERIDEX) 0.12 % solution Swish and spit 15 mLs in mouth 2 times daily 118 mL 3    Cholecalciferol (VITAMIN D3) 50 MCG (2000 UT) CAPS TAKE 1 CAPSULE BY MOUTH DAILY 30 capsule 3    hydrOXYzine barrett (VISTARIL) 25 MG capsule TAKE 1 OR 2 CAPSULES BY MOUTH 2 TIMES A DAY AS NEEDED FOR ANXIETY OR INSOMNIA 120 capsule 3    pantoprazole (PROTONIX) 40 MG EC tablet TAKE 1 TABLET BY MOUTH DAILY 90 tablet 0    prochlorperazine (COMPAZINE) 5 MG tablet TAKE 1 TO 2 TABLETS BY MOUTH EVERY 8 HOURS AS NEEDED FOR NAUSEA 90 tablet 0    semaglutide-weight management (WEGOVY) 0.25 MG/0.5ML pen Inject 0.5 mLs (0.25 mg) subcutaneously once a week. 2 mL 0    SUMAtriptan (IMITREX) 100 MG tablet TAKE 1TABLET BY MOUTH AT ONSET OF HEADACHE FOR MIGRAINE 9 tablet 0     Current Facility-Administered Medications   Medication Dose Route Frequency Provider Last Rate Last Admin    medroxyPROGESTERone (DEPO-PROVERA) injection 150 mg  150 mg Intramuscular Q90 Days Kendra Yanez MD            Allergies:     Allergies   Allergen Reactions    Linaclotide      diarrhea        Nutrition Education:  Weight Management/Prediabetes: Food choices, portions, meal/snack timing, meal planning, food label, activity, setting goals    Nutrition Goals:  Plan out snack to contain protein and fiber    Nutrition Follow-up/ Monitoring:  Planned snacks- protein and fiber      Time spent with pt_ 60 min  Follow up with RD in 2 months.   Patient has RD's contact  information to call/email if needed.      Kary Rodgers RD

## 2025-05-08 DIAGNOSIS — K21.9 GASTROESOPHAGEAL REFLUX DISEASE, UNSPECIFIED WHETHER ESOPHAGITIS PRESENT: ICD-10-CM

## 2025-05-08 RX ORDER — PANTOPRAZOLE SODIUM 40 MG/1
40 TABLET, DELAYED RELEASE ORAL DAILY
Qty: 90 TABLET | Refills: 1 | Status: SHIPPED | OUTPATIENT
Start: 2025-05-08

## 2025-05-20 ENCOUNTER — MYC REFILL (OUTPATIENT)
Dept: FAMILY MEDICINE | Facility: OTHER | Age: 40
End: 2025-05-20

## 2025-05-20 DIAGNOSIS — Z76.89 ENCOUNTER FOR WEIGHT MANAGEMENT: ICD-10-CM

## 2025-05-23 ENCOUNTER — RESULTS FOLLOW-UP (OUTPATIENT)
Dept: FAMILY MEDICINE | Facility: OTHER | Age: 40
End: 2025-05-23

## 2025-06-18 ENCOUNTER — MYC REFILL (OUTPATIENT)
Dept: FAMILY MEDICINE | Facility: OTHER | Age: 40
End: 2025-06-18

## 2025-06-18 DIAGNOSIS — Z76.89 ENCOUNTER FOR WEIGHT MANAGEMENT: ICD-10-CM

## 2025-06-18 NOTE — TELEPHONE ENCOUNTER
Patient comment: Hi! Can Dr. Yanez send in the next dose of Wegovy? I just took the last 0.5 yesterday. I m still doing good, lost about 10 more pounds. No side effects other than the metallic taste and that s gotten better.       Pended next dose.     Semaglutide-Weight Management (WEGOVY) 0.5 MG/0.5ML pen         Last Written Prescription Date:  5/21/25  Last Fill Quantity: 2 mL,   # refills: 0  Last Office Visit: 5/23/25  Future Office visit:    Next 5 appointments (look out 90 days)      Sep 03, 2025 8:45 AM  (Arrive by 8:30 AM)  Provider Visit with Kendra Yanez MD  [facility] ([facility] ) [address]  [place] [address]  [phone]

## 2025-06-25 ENCOUNTER — ALLIED HEALTH/NURSE VISIT (OUTPATIENT)
Dept: ALLERGY | Facility: OTHER | Age: 40
End: 2025-06-25
Attending: FAMILY MEDICINE
Payer: COMMERCIAL

## 2025-06-25 DIAGNOSIS — Z30.42 ENCOUNTER FOR SURVEILLANCE OF INJECTABLE CONTRACEPTIVE: Primary | ICD-10-CM

## 2025-06-25 PROCEDURE — 250N000011 HC RX IP 250 OP 636: Mod: JZ | Performed by: FAMILY MEDICINE

## 2025-06-25 PROCEDURE — 96372 THER/PROPH/DIAG INJ SC/IM: CPT | Performed by: FAMILY MEDICINE

## 2025-06-25 RX ADMIN — MEDROXYPROGESTERONE ACETATE 150 MG: 150 INJECTION, SUSPENSION INTRAMUSCULAR at 13:12

## 2025-06-25 NOTE — PROGRESS NOTES
Clinic Administered Medication Documentation      Depo Provera Documentation    Depo-Provera Standing Order inclusion/exclusion criteria reviewed.     Is this the initial or subsequent dose of Depo Provera? Subsequent dose - patient is within the acceptable window of time (11-15 weeks) for subsequent injection. Pregnancy test not indicated.    Patient meets: inclusion criteria     Is there an active order (written within the past 365 days, with administrations remaining, not ) in the chart? Yes.     Prior to injection, verified patient identity using patient's name and date of birth. Medication was administered. Please see MAR and medication order for additional information.     Vial/Syringe: Single dose vial. Was entire vial of medication used? Yes    Patient instructed to remain in clinic for 15 minutes and report any adverse reaction to staff immediately but patient declined.  NEXT INJECTION DUE: 9/10/25 - 10/8/25    Verified that the patient has refills remaining in their prescription.

## 2025-07-16 DIAGNOSIS — F33.9 EPISODE OF RECURRENT MAJOR DEPRESSIVE DISORDER, UNSPECIFIED DEPRESSION EPISODE SEVERITY: ICD-10-CM

## 2025-07-16 DIAGNOSIS — Z71.6 ENCOUNTER FOR TOBACCO USE CESSATION COUNSELING: ICD-10-CM

## 2025-07-16 DIAGNOSIS — F17.200 TOBACCO USE DISORDER: ICD-10-CM

## 2025-07-16 RX ORDER — BUPROPION HYDROCHLORIDE 150 MG/1
150 TABLET, EXTENDED RELEASE ORAL 2 TIMES DAILY
Qty: 60 TABLET | Refills: 2 | Status: SHIPPED | OUTPATIENT
Start: 2025-07-16

## 2025-08-14 DIAGNOSIS — E55.9 VITAMIN D DEFICIENCY: ICD-10-CM

## 2025-08-14 RX ORDER — ACETAMINOPHEN 160 MG
1 TABLET,DISINTEGRATING ORAL DAILY
Qty: 30 CAPSULE | Refills: 3 | Status: SHIPPED | OUTPATIENT
Start: 2025-08-14

## 2025-08-20 ENCOUNTER — HOSPITAL ENCOUNTER (EMERGENCY)
Facility: HOSPITAL | Age: 40
Discharge: HOME | End: 2025-08-20
Attending: FAMILY MEDICINE
Payer: COMMERCIAL

## 2025-08-20 ASSESSMENT — ENCOUNTER SYMPTOMS
ABDOMINAL PAIN: 0
COUGH: 0
NECK SWELLING: 0
SHORTNESS OF BREATH: 0
FACIAL SWELLING: 1
FEVER: 0

## 2025-08-20 ASSESSMENT — COLUMBIA-SUICIDE SEVERITY RATING SCALE - C-SSRS
6. HAVE YOU EVER DONE ANYTHING, STARTED TO DO ANYTHING, OR PREPARED TO DO ANYTHING TO END YOUR LIFE?: NO
1. IN THE PAST MONTH, HAVE YOU WISHED YOU WERE DEAD OR WISHED YOU COULD GO TO SLEEP AND NOT WAKE UP?: NO
2. HAVE YOU ACTUALLY HAD ANY THOUGHTS OF KILLING YOURSELF IN THE PAST MONTH?: NO

## 2025-08-26 ENCOUNTER — HOSPITAL ENCOUNTER (OUTPATIENT)
Dept: ULTRASOUND IMAGING | Facility: HOSPITAL | Age: 40
Discharge: HOME | End: 2025-08-26
Attending: FAMILY MEDICINE
Payer: COMMERCIAL

## 2025-08-26 DIAGNOSIS — N92.6 IRREGULAR MENSES: ICD-10-CM

## 2025-08-26 PROCEDURE — 76830 TRANSVAGINAL US NON-OB: CPT | Mod: 26 | Performed by: RADIOLOGY

## 2025-08-26 PROCEDURE — 76856 US EXAM PELVIC COMPLETE: CPT | Mod: 26 | Performed by: RADIOLOGY

## 2025-08-26 PROCEDURE — 76856 US EXAM PELVIC COMPLETE: CPT

## 2025-08-28 ASSESSMENT — ANXIETY QUESTIONNAIRES
1. FEELING NERVOUS, ANXIOUS, OR ON EDGE: NOT AT ALL
3. WORRYING TOO MUCH ABOUT DIFFERENT THINGS: SEVERAL DAYS
7. FEELING AFRAID AS IF SOMETHING AWFUL MIGHT HAPPEN: NOT AT ALL
2. NOT BEING ABLE TO STOP OR CONTROL WORRYING: SEVERAL DAYS
8. IF YOU CHECKED OFF ANY PROBLEMS, HOW DIFFICULT HAVE THESE MADE IT FOR YOU TO DO YOUR WORK, TAKE CARE OF THINGS AT HOME, OR GET ALONG WITH OTHER PEOPLE?: SOMEWHAT DIFFICULT
IF YOU CHECKED OFF ANY PROBLEMS ON THIS QUESTIONNAIRE, HOW DIFFICULT HAVE THESE PROBLEMS MADE IT FOR YOU TO DO YOUR WORK, TAKE CARE OF THINGS AT HOME, OR GET ALONG WITH OTHER PEOPLE: SOMEWHAT DIFFICULT
GAD7 TOTAL SCORE: 4
7. FEELING AFRAID AS IF SOMETHING AWFUL MIGHT HAPPEN: NOT AT ALL
4. TROUBLE RELAXING: SEVERAL DAYS
GAD7 TOTAL SCORE: 4
GAD7 TOTAL SCORE: 4
6. BECOMING EASILY ANNOYED OR IRRITABLE: NOT AT ALL
5. BEING SO RESTLESS THAT IT IS HARD TO SIT STILL: SEVERAL DAYS

## 2025-09-03 ENCOUNTER — TELEPHONE (OUTPATIENT)
Dept: FAMILY MEDICINE | Facility: OTHER | Age: 40
End: 2025-09-03

## 2025-09-03 ENCOUNTER — OFFICE VISIT (OUTPATIENT)
Dept: FAMILY MEDICINE | Facility: OTHER | Age: 40
End: 2025-09-03
Attending: FAMILY MEDICINE
Payer: COMMERCIAL

## 2025-09-03 VITALS
BODY MASS INDEX: 29.01 KG/M2 | SYSTOLIC BLOOD PRESSURE: 128 MMHG | WEIGHT: 169.9 LBS | HEIGHT: 64 IN | RESPIRATION RATE: 18 BRPM | DIASTOLIC BLOOD PRESSURE: 82 MMHG | TEMPERATURE: 98.7 F | OXYGEN SATURATION: 98 % | HEART RATE: 81 BPM

## 2025-09-03 DIAGNOSIS — F33.9 EPISODE OF RECURRENT MAJOR DEPRESSIVE DISORDER, UNSPECIFIED DEPRESSION EPISODE SEVERITY: ICD-10-CM

## 2025-09-03 DIAGNOSIS — R73.03 PREDIABETES: ICD-10-CM

## 2025-09-03 DIAGNOSIS — E66.3 OVERWEIGHT (BMI 25.0-29.9): Primary | ICD-10-CM

## 2025-09-03 DIAGNOSIS — L71.9 ACNE ROSACEA: ICD-10-CM

## 2025-09-03 DIAGNOSIS — E88.819 INSULIN RESISTANCE: ICD-10-CM

## 2025-09-03 DIAGNOSIS — Z76.89 ENCOUNTER FOR WEIGHT MANAGEMENT: ICD-10-CM

## 2025-09-03 DIAGNOSIS — L71.9 ACNE ROSACEA: Primary | ICD-10-CM

## 2025-09-03 LAB
EST. AVERAGE GLUCOSE BLD GHB EST-MCNC: 100 MG/DL
HBA1C MFR BLD: 5.1 %
INSULIN SERPL-ACNC: 47.9 UU/ML (ref 2.6–24.9)

## 2025-09-03 PROCEDURE — 36415 COLL VENOUS BLD VENIPUNCTURE: CPT | Mod: ZL | Performed by: FAMILY MEDICINE

## 2025-09-03 PROCEDURE — 83525 ASSAY OF INSULIN: CPT | Mod: ZL | Performed by: FAMILY MEDICINE

## 2025-09-03 PROCEDURE — G0463 HOSPITAL OUTPT CLINIC VISIT: HCPCS

## 2025-09-03 PROCEDURE — 83036 HEMOGLOBIN GLYCOSYLATED A1C: CPT | Mod: ZL | Performed by: FAMILY MEDICINE

## 2025-09-03 RX ORDER — METRONIDAZOLE 7.5 MG/G
LOTION TOPICAL 2 TIMES DAILY
Qty: 59 ML | Refills: 2 | Status: SHIPPED | OUTPATIENT
Start: 2025-09-03

## 2025-09-03 ASSESSMENT — PAIN SCALES - GENERAL: PAINLEVEL_OUTOF10: NO PAIN (0)

## (undated) DEVICE — INZII RETRIEVAL SYSTEM-10MM

## (undated) DEVICE — GLV-8.0 PROTEXIS PI CLASSIC LF/PF

## (undated) DEVICE — SUTURE-MONOCRYL 4-0 Y494G

## (undated) DEVICE — TROCAR SYSTEM-BLUNT TIP 12X100MM KII BALLOON

## (undated) DEVICE — LABEL-STERILE PREPRINTED FOR OR

## (undated) DEVICE — TUBE-SALEM SUMP 18FR STOMACH SUCTION

## (undated) DEVICE — IRRIGATION-H2O 1000ML

## (undated) DEVICE — SCISSOR-ENDOPATH 5MM CURVED

## (undated) DEVICE — TUBING-SEECLEAR LAPAROSCOPIC SMOKE EVACUATION

## (undated) DEVICE — PACK-BASIN SET-UP

## (undated) DEVICE — TROCAR SLEEVE-KII 5X100MM

## (undated) DEVICE — SOL-NACL 0.9% 1000ML

## (undated) DEVICE — CAUTERY PAD-POLYHESIVE II ADULT

## (undated) DEVICE — BLANKET-BAIR UPPER BODY

## (undated) DEVICE — APPLICATOR-CHLORAPREP 26ML TINTED CHG 2%+ 70% IPA-SURGICAL

## (undated) DEVICE — CAUTERY-LAP L-HOOK

## (undated) DEVICE — SCD SLEEVE-KNEE REG.

## (undated) DEVICE — CLEARIFY VISUALIZATION SYSTEM (SCOPE WARMER)

## (undated) DEVICE — TROCAR-5X100MM FIOS BLADELESS

## (undated) DEVICE — CORD-LAPAROSCOPIC MONOPOLAR-DISPOSABLE

## (undated) DEVICE — GLV-8.5 PROTEXIS PI CLASSIC LF/PF

## (undated) DEVICE — CANISTER-SUCTION 2000CC

## (undated) DEVICE — WANDS-INSULSCAN

## (undated) DEVICE — SUCTION-IRRIGATION STRYKEFLOW II (STRYKER)

## (undated) DEVICE — SUTURE-VICRYL 0 UR-6 J603H

## (undated) DEVICE — LIGHT HANDLE COVER

## (undated) DEVICE — PACK-LAPAROSCOPY-CUSTOM

## (undated) DEVICE — IRRIGATION-NACL 1000ML

## (undated) DEVICE — CLIP APPLIER-LIGAMAX 5MM MEDIUM/LARGE

## (undated) RX ORDER — PROPOFOL 10 MG/ML
INJECTION, EMULSION INTRAVENOUS
Status: DISPENSED
Start: 2021-02-27

## (undated) RX ORDER — MEDROXYPROGESTERONE ACETATE 150 MG/ML
INJECTION, SUSPENSION INTRAMUSCULAR
Status: DISPENSED
Start: 2025-06-25

## (undated) RX ORDER — MEDROXYPROGESTERONE ACETATE 150 MG/ML
INJECTION, SUSPENSION INTRAMUSCULAR
Status: DISPENSED
Start: 2024-06-26

## (undated) RX ORDER — MEDROXYPROGESTERONE ACETATE 150 MG/ML
INJECTION, SUSPENSION INTRAMUSCULAR
Status: DISPENSED
Start: 2023-06-30

## (undated) RX ORDER — LIDOCAINE HYDROCHLORIDE 20 MG/ML
INJECTION, SOLUTION EPIDURAL; INFILTRATION; INTRACAUDAL; PERINEURAL
Status: DISPENSED
Start: 2021-02-27

## (undated) RX ORDER — MEDROXYPROGESTERONE ACETATE 150 MG/ML
INJECTION, SUSPENSION INTRAMUSCULAR
Status: DISPENSED
Start: 2025-03-26

## (undated) RX ORDER — MEDROXYPROGESTERONE ACETATE 150 MG/ML
INJECTION, SUSPENSION INTRAMUSCULAR
Status: DISPENSED
Start: 2023-12-20

## (undated) RX ORDER — MEDROXYPROGESTERONE ACETATE 150 MG/ML
INJECTION, SUSPENSION INTRAMUSCULAR
Status: DISPENSED
Start: 2024-09-11

## (undated) RX ORDER — FENTANYL CITRATE 50 UG/ML
INJECTION, SOLUTION INTRAMUSCULAR; INTRAVENOUS
Status: DISPENSED
Start: 2021-02-27

## (undated) RX ORDER — MEDROXYPROGESTERONE ACETATE 150 MG/ML
INJECTION, SUSPENSION INTRAMUSCULAR
Status: DISPENSED
Start: 2024-03-27

## (undated) RX ORDER — DEXAMETHASONE SODIUM PHOSPHATE 10 MG/ML
INJECTION, SOLUTION INTRAMUSCULAR; INTRAVENOUS
Status: DISPENSED
Start: 2021-02-27

## (undated) RX ORDER — ONDANSETRON 2 MG/ML
INJECTION INTRAMUSCULAR; INTRAVENOUS
Status: DISPENSED
Start: 2021-02-27

## (undated) RX ORDER — KETOROLAC TROMETHAMINE 30 MG/ML
INJECTION, SOLUTION INTRAMUSCULAR; INTRAVENOUS
Status: DISPENSED
Start: 2021-02-27

## (undated) RX ORDER — FENTANYL CITRATE-0.9 % NACL/PF 10 MCG/ML
PLASTIC BAG, INJECTION (ML) INTRAVENOUS
Status: DISPENSED
Start: 2021-02-27

## (undated) RX ORDER — MEDROXYPROGESTERONE ACETATE 150 MG/ML
INJECTION, SUSPENSION INTRAMUSCULAR
Status: DISPENSED
Start: 2024-12-18

## (undated) RX ORDER — MEDROXYPROGESTERONE ACETATE 150 MG/ML
INJECTION, SUSPENSION INTRAMUSCULAR
Status: DISPENSED
Start: 2023-09-18